# Patient Record
Sex: MALE | Race: WHITE | NOT HISPANIC OR LATINO | URBAN - METROPOLITAN AREA
[De-identification: names, ages, dates, MRNs, and addresses within clinical notes are randomized per-mention and may not be internally consistent; named-entity substitution may affect disease eponyms.]

---

## 2018-07-29 ENCOUNTER — INPATIENT (INPATIENT)
Facility: HOSPITAL | Age: 83
LOS: 5 days | Discharge: HOME | DRG: 291 | End: 2018-08-04
Attending: INTERNAL MEDICINE | Admitting: INTERNAL MEDICINE
Payer: MEDICARE

## 2018-07-29 VITALS
WEIGHT: 149.03 LBS | RESPIRATION RATE: 24 BRPM | HEART RATE: 60 BPM | TEMPERATURE: 98 F | HEIGHT: 64 IN | SYSTOLIC BLOOD PRESSURE: 168 MMHG | DIASTOLIC BLOOD PRESSURE: 54 MMHG | OXYGEN SATURATION: 97 %

## 2018-07-29 DIAGNOSIS — R06.09 OTHER FORMS OF DYSPNEA: ICD-10-CM

## 2018-07-29 LAB
ALBUMIN SERPL ELPH-MCNC: 3.4 G/DL — LOW (ref 3.5–5)
ALP SERPL-CCNC: 62 U/L — SIGNIFICANT CHANGE UP (ref 40–120)
ALT FLD-CCNC: 15 U/L DA — SIGNIFICANT CHANGE UP (ref 10–60)
ANION GAP SERPL CALC-SCNC: 7 MMOL/L — SIGNIFICANT CHANGE UP (ref 5–17)
APTT BLD: 30 SEC — SIGNIFICANT CHANGE UP (ref 27.5–37.4)
AST SERPL-CCNC: 16 U/L — SIGNIFICANT CHANGE UP (ref 10–40)
BASOPHILS # BLD AUTO: 0 K/UL — SIGNIFICANT CHANGE UP (ref 0–0.2)
BASOPHILS NFR BLD AUTO: 0.9 % — SIGNIFICANT CHANGE UP (ref 0–2)
BILIRUB SERPL-MCNC: 2.4 MG/DL — HIGH (ref 0.2–1.2)
BUN SERPL-MCNC: 52 MG/DL — HIGH (ref 7–18)
CALCIUM SERPL-MCNC: 9.6 MG/DL — SIGNIFICANT CHANGE UP (ref 8.4–10.5)
CHLORIDE SERPL-SCNC: 105 MMOL/L — SIGNIFICANT CHANGE UP (ref 96–108)
CK MB BLD-MCNC: 9.2 % — HIGH (ref 0–3.5)
CK MB CFR SERPL CALC: 1.1 NG/ML — SIGNIFICANT CHANGE UP (ref 0–3.6)
CK SERPL-CCNC: 12 U/L — LOW (ref 35–232)
CO2 SERPL-SCNC: 31 MMOL/L — SIGNIFICANT CHANGE UP (ref 22–31)
CREAT SERPL-MCNC: 2.48 MG/DL — HIGH (ref 0.5–1.3)
EOSINOPHIL # BLD AUTO: 0.2 K/UL — SIGNIFICANT CHANGE UP (ref 0–0.5)
EOSINOPHIL NFR BLD AUTO: 4.3 % — SIGNIFICANT CHANGE UP (ref 0–6)
GLUCOSE SERPL-MCNC: 139 MG/DL — HIGH (ref 70–99)
HCT VFR BLD CALC: 26.6 % — LOW (ref 39–50)
HGB BLD-MCNC: 9 G/DL — LOW (ref 13–17)
INR BLD: 1 RATIO — SIGNIFICANT CHANGE UP (ref 0.88–1.16)
LIDOCAIN IGE QN: 130 U/L — SIGNIFICANT CHANGE UP (ref 73–393)
LYMPHOCYTES # BLD AUTO: 1 K/UL — SIGNIFICANT CHANGE UP (ref 1–3.3)
LYMPHOCYTES # BLD AUTO: 18.3 % — SIGNIFICANT CHANGE UP (ref 13–44)
MCHC RBC-ENTMCNC: 30.2 PG — SIGNIFICANT CHANGE UP (ref 27–34)
MCHC RBC-ENTMCNC: 33.8 GM/DL — SIGNIFICANT CHANGE UP (ref 32–36)
MCV RBC AUTO: 89.2 FL — SIGNIFICANT CHANGE UP (ref 80–100)
MONOCYTES # BLD AUTO: 0.7 K/UL — SIGNIFICANT CHANGE UP (ref 0–0.9)
MONOCYTES NFR BLD AUTO: 12 % — SIGNIFICANT CHANGE UP (ref 2–14)
NEUTROPHILS # BLD AUTO: 3.7 K/UL — SIGNIFICANT CHANGE UP (ref 1.8–7.4)
NEUTROPHILS NFR BLD AUTO: 64.6 % — SIGNIFICANT CHANGE UP (ref 43–77)
NT-PROBNP SERPL-SCNC: 3084 PG/ML — HIGH (ref 0–450)
PLATELET # BLD AUTO: 154 K/UL — SIGNIFICANT CHANGE UP (ref 150–400)
POTASSIUM SERPL-MCNC: 3.6 MMOL/L — SIGNIFICANT CHANGE UP (ref 3.5–5.3)
POTASSIUM SERPL-SCNC: 3.6 MMOL/L — SIGNIFICANT CHANGE UP (ref 3.5–5.3)
PROT SERPL-MCNC: 6.7 G/DL — SIGNIFICANT CHANGE UP (ref 6–8.3)
PROTHROM AB SERPL-ACNC: 10.9 SEC — SIGNIFICANT CHANGE UP (ref 9.8–12.7)
RBC # BLD: 2.99 M/UL — LOW (ref 4.2–5.8)
RBC # FLD: 13.5 % — SIGNIFICANT CHANGE UP (ref 10.3–14.5)
SODIUM SERPL-SCNC: 143 MMOL/L — SIGNIFICANT CHANGE UP (ref 135–145)
TROPONIN I SERPL-MCNC: 0.02 NG/ML — SIGNIFICANT CHANGE UP (ref 0–0.04)
WBC # BLD: 5.7 K/UL — SIGNIFICANT CHANGE UP (ref 3.8–10.5)
WBC # FLD AUTO: 5.7 K/UL — SIGNIFICANT CHANGE UP (ref 3.8–10.5)

## 2018-07-29 PROCEDURE — 93010 ELECTROCARDIOGRAM REPORT: CPT

## 2018-07-29 PROCEDURE — 99285 EMERGENCY DEPT VISIT HI MDM: CPT

## 2018-07-29 PROCEDURE — 71045 X-RAY EXAM CHEST 1 VIEW: CPT | Mod: 26

## 2018-07-29 RX ORDER — FUROSEMIDE 40 MG
80 TABLET ORAL ONCE
Qty: 0 | Refills: 0 | Status: COMPLETED | OUTPATIENT
Start: 2018-07-29 | End: 2018-07-29

## 2018-07-29 RX ORDER — SODIUM CHLORIDE 9 MG/ML
3 INJECTION INTRAMUSCULAR; INTRAVENOUS; SUBCUTANEOUS ONCE
Qty: 0 | Refills: 0 | Status: COMPLETED | OUTPATIENT
Start: 2018-07-29 | End: 2018-07-29

## 2018-07-29 RX ADMIN — SODIUM CHLORIDE 3 MILLILITER(S): 9 INJECTION INTRAMUSCULAR; INTRAVENOUS; SUBCUTANEOUS at 18:50

## 2018-07-29 RX ADMIN — Medication 80 MILLIGRAM(S): at 20:51

## 2018-07-29 NOTE — ED PROVIDER NOTE - MEDICAL DECISION MAKING DETAILS
85 year old M Pt w/ dyspnea on exertion, leg edema concern for CHF, Pt also presents right eye blurred vision x 1 week, will check labs and admit patient

## 2018-07-29 NOTE — ED PROVIDER NOTE - CARE PLAN
Principal Discharge DX:	CLIFTON (dyspnea on exertion)  Secondary Diagnosis:	Renal insufficiency Principal Discharge DX:	CLIFTON (dyspnea on exertion)  Secondary Diagnosis:	Renal insufficiency  Secondary Diagnosis:	Blurred vision, right eye

## 2018-07-29 NOTE — ED PROVIDER NOTE - OBJECTIVE STATEMENT
85 year old M Pt w/ PMHx of pacemaker, asthma presents to ED c/o right eye blurry vision and dyspnea on exertion x 1 week. Pt reports that the blurred vision worsened today, causing him to be concerned. Pt was admitted to hospital July 2nd through July 9th for pneumonia and bacteriemia . Pt denies cough, fever, abdominal pain, chest pain, and all other complaints.

## 2018-07-29 NOTE — ED ADULT NURSE NOTE - OBJECTIVE STATEMENT
pt is A&Ox3, ambulatory, able to make needs known and presents with C/O SOB with excretion x 2 weeks and right eye blurriness  x1 day.

## 2018-07-29 NOTE — ED PROVIDER NOTE - PMH
AICD (automatic cardioverter/defibrillator) present    CAD (coronary artery disease)    CKD (chronic kidney disease)    Hypertension    Kidney carcinoma  s/p right nephrectomy  Pacemaker    Prostate cancer AICD (automatic cardioverter/defibrillator) present    CAD (coronary artery disease)    CKD (chronic kidney disease)    Hypertension    Kidney carcinoma  s/p right nephrectomy  Pacemaker    Prostate cancer    Stented coronary artery

## 2018-07-30 DIAGNOSIS — N18.9 CHRONIC KIDNEY DISEASE, UNSPECIFIED: ICD-10-CM

## 2018-07-30 DIAGNOSIS — Z29.9 ENCOUNTER FOR PROPHYLACTIC MEASURES, UNSPECIFIED: ICD-10-CM

## 2018-07-30 DIAGNOSIS — I25.10 ATHEROSCLEROTIC HEART DISEASE OF NATIVE CORONARY ARTERY WITHOUT ANGINA PECTORIS: ICD-10-CM

## 2018-07-30 DIAGNOSIS — I10 ESSENTIAL (PRIMARY) HYPERTENSION: ICD-10-CM

## 2018-07-30 DIAGNOSIS — I50.9 HEART FAILURE, UNSPECIFIED: ICD-10-CM

## 2018-07-30 DIAGNOSIS — H53.8 OTHER VISUAL DISTURBANCES: ICD-10-CM

## 2018-07-30 LAB
ALBUMIN SERPL ELPH-MCNC: 3.5 G/DL — SIGNIFICANT CHANGE UP (ref 3.5–5)
ALP SERPL-CCNC: 65 U/L — SIGNIFICANT CHANGE UP (ref 40–120)
ALT FLD-CCNC: 17 U/L DA — SIGNIFICANT CHANGE UP (ref 10–60)
ANION GAP SERPL CALC-SCNC: 6 MMOL/L — SIGNIFICANT CHANGE UP (ref 5–17)
AST SERPL-CCNC: 23 U/L — SIGNIFICANT CHANGE UP (ref 10–40)
BASOPHILS # BLD AUTO: 0 K/UL — SIGNIFICANT CHANGE UP (ref 0–0.2)
BASOPHILS NFR BLD AUTO: 0.7 % — SIGNIFICANT CHANGE UP (ref 0–2)
BILIRUB SERPL-MCNC: 2.5 MG/DL — HIGH (ref 0.2–1.2)
BUN SERPL-MCNC: 51 MG/DL — HIGH (ref 7–18)
CALCIUM SERPL-MCNC: 9.7 MG/DL — SIGNIFICANT CHANGE UP (ref 8.4–10.5)
CHLORIDE SERPL-SCNC: 105 MMOL/L — SIGNIFICANT CHANGE UP (ref 96–108)
CHOLEST SERPL-MCNC: 116 MG/DL — SIGNIFICANT CHANGE UP (ref 10–199)
CK MB BLD-MCNC: 4.8 % — HIGH (ref 0–3.5)
CK MB CFR SERPL CALC: 1.2 NG/ML — SIGNIFICANT CHANGE UP (ref 0–3.6)
CK SERPL-CCNC: 25 U/L — LOW (ref 35–232)
CO2 SERPL-SCNC: 32 MMOL/L — HIGH (ref 22–31)
CREAT SERPL-MCNC: 2.26 MG/DL — HIGH (ref 0.5–1.3)
EOSINOPHIL # BLD AUTO: 0.3 K/UL — SIGNIFICANT CHANGE UP (ref 0–0.5)
EOSINOPHIL NFR BLD AUTO: 5.3 % — SIGNIFICANT CHANGE UP (ref 0–6)
GLUCOSE SERPL-MCNC: 136 MG/DL — HIGH (ref 70–99)
HBA1C BLD-MCNC: 5 % — SIGNIFICANT CHANGE UP (ref 4–5.6)
HCT VFR BLD CALC: 28 % — LOW (ref 39–50)
HDLC SERPL-MCNC: 41 MG/DL — SIGNIFICANT CHANGE UP (ref 40–125)
HGB BLD-MCNC: 9.4 G/DL — LOW (ref 13–17)
LIPID PNL WITH DIRECT LDL SERPL: 45 MG/DL — SIGNIFICANT CHANGE UP
LYMPHOCYTES # BLD AUTO: 0.9 K/UL — LOW (ref 1–3.3)
LYMPHOCYTES # BLD AUTO: 20 % — SIGNIFICANT CHANGE UP (ref 13–44)
MAGNESIUM SERPL-MCNC: 2.3 MG/DL — SIGNIFICANT CHANGE UP (ref 1.6–2.6)
MCHC RBC-ENTMCNC: 30 PG — SIGNIFICANT CHANGE UP (ref 27–34)
MCHC RBC-ENTMCNC: 33.5 GM/DL — SIGNIFICANT CHANGE UP (ref 32–36)
MCV RBC AUTO: 89.4 FL — SIGNIFICANT CHANGE UP (ref 80–100)
MONOCYTES # BLD AUTO: 0.6 K/UL — SIGNIFICANT CHANGE UP (ref 0–0.9)
MONOCYTES NFR BLD AUTO: 11.9 % — SIGNIFICANT CHANGE UP (ref 2–14)
NEUTROPHILS # BLD AUTO: 3 K/UL — SIGNIFICANT CHANGE UP (ref 1.8–7.4)
NEUTROPHILS NFR BLD AUTO: 62.1 % — SIGNIFICANT CHANGE UP (ref 43–77)
PHOSPHATE SERPL-MCNC: 3.5 MG/DL — SIGNIFICANT CHANGE UP (ref 2.5–4.5)
PLATELET # BLD AUTO: 150 K/UL — SIGNIFICANT CHANGE UP (ref 150–400)
POTASSIUM SERPL-MCNC: 3.6 MMOL/L — SIGNIFICANT CHANGE UP (ref 3.5–5.3)
POTASSIUM SERPL-SCNC: 3.6 MMOL/L — SIGNIFICANT CHANGE UP (ref 3.5–5.3)
PROT SERPL-MCNC: 6.6 G/DL — SIGNIFICANT CHANGE UP (ref 6–8.3)
RBC # BLD: 3.13 M/UL — LOW (ref 4.2–5.8)
RBC # FLD: 13.4 % — SIGNIFICANT CHANGE UP (ref 10.3–14.5)
SODIUM SERPL-SCNC: 143 MMOL/L — SIGNIFICANT CHANGE UP (ref 135–145)
TOTAL CHOLESTEROL/HDL RATIO MEASUREMENT: 2.8 RATIO — LOW (ref 3.4–9.6)
TRIGL SERPL-MCNC: 149 MG/DL — SIGNIFICANT CHANGE UP (ref 10–149)
TROPONIN I SERPL-MCNC: 0.02 NG/ML — SIGNIFICANT CHANGE UP (ref 0–0.04)
TROPONIN I SERPL-MCNC: <0.015 NG/ML — SIGNIFICANT CHANGE UP (ref 0–0.04)
VIT B12 SERPL-MCNC: 944 PG/ML — SIGNIFICANT CHANGE UP (ref 232–1245)
WBC # BLD: 4.8 K/UL — SIGNIFICANT CHANGE UP (ref 3.8–10.5)
WBC # FLD AUTO: 4.8 K/UL — SIGNIFICANT CHANGE UP (ref 3.8–10.5)

## 2018-07-30 RX ORDER — HYDRALAZINE HCL 50 MG
25 TABLET ORAL EVERY 8 HOURS
Qty: 0 | Refills: 0 | Status: DISCONTINUED | OUTPATIENT
Start: 2018-07-30 | End: 2018-08-04

## 2018-07-30 RX ORDER — METOPROLOL TARTRATE 50 MG
50 TABLET ORAL DAILY
Qty: 0 | Refills: 0 | Status: DISCONTINUED | OUTPATIENT
Start: 2018-07-30 | End: 2018-08-04

## 2018-07-30 RX ORDER — CLOPIDOGREL BISULFATE 75 MG/1
75 TABLET, FILM COATED ORAL DAILY
Qty: 0 | Refills: 0 | Status: DISCONTINUED | OUTPATIENT
Start: 2018-07-30 | End: 2018-08-04

## 2018-07-30 RX ORDER — ISOSORBIDE DINITRATE 5 MG/1
20 TABLET ORAL
Qty: 0 | Refills: 0 | Status: DISCONTINUED | OUTPATIENT
Start: 2018-07-30 | End: 2018-07-30

## 2018-07-30 RX ORDER — SIMVASTATIN 20 MG/1
10 TABLET, FILM COATED ORAL AT BEDTIME
Qty: 0 | Refills: 0 | Status: DISCONTINUED | OUTPATIENT
Start: 2018-07-30 | End: 2018-08-04

## 2018-07-30 RX ORDER — FUROSEMIDE 40 MG
40 TABLET ORAL DAILY
Qty: 0 | Refills: 0 | Status: COMPLETED | OUTPATIENT
Start: 2018-07-30 | End: 2018-07-31

## 2018-07-30 RX ORDER — HEPARIN SODIUM 5000 [USP'U]/ML
5000 INJECTION INTRAVENOUS; SUBCUTANEOUS EVERY 8 HOURS
Qty: 0 | Refills: 0 | Status: DISCONTINUED | OUTPATIENT
Start: 2018-07-30 | End: 2018-08-04

## 2018-07-30 RX ADMIN — Medication 0.2 MILLIGRAM(S): at 13:21

## 2018-07-30 RX ADMIN — HEPARIN SODIUM 5000 UNIT(S): 5000 INJECTION INTRAVENOUS; SUBCUTANEOUS at 06:38

## 2018-07-30 RX ADMIN — Medication 50 MILLIGRAM(S): at 06:39

## 2018-07-30 RX ADMIN — Medication 25 MILLIGRAM(S): at 13:21

## 2018-07-30 RX ADMIN — Medication 1 APPLICATION(S): at 06:40

## 2018-07-30 RX ADMIN — Medication 40 MILLIGRAM(S): at 06:38

## 2018-07-30 RX ADMIN — CLOPIDOGREL BISULFATE 75 MILLIGRAM(S): 75 TABLET, FILM COATED ORAL at 11:36

## 2018-07-30 RX ADMIN — Medication 25 MILLIGRAM(S): at 06:39

## 2018-07-30 RX ADMIN — Medication 0.2 MILLIGRAM(S): at 06:39

## 2018-07-30 RX ADMIN — Medication 1 APPLICATION(S): at 17:39

## 2018-07-30 RX ADMIN — HEPARIN SODIUM 5000 UNIT(S): 5000 INJECTION INTRAVENOUS; SUBCUTANEOUS at 13:22

## 2018-07-30 RX ADMIN — Medication 1 APPLICATION(S): at 11:54

## 2018-07-30 NOTE — H&P ADULT - PROBLEM SELECTOR PLAN 1
Patient presented with dyspnea on exertion, with B/L leg swelling, Mild JVD with rales on auscultation on PE, BNP of 3084 --> acute on chronic CHF exacerbation  s/p 80 IV lasix  c/w strict input and output, daily weights   c/w lasix IV titrate as needed  fluid restriction   Echo on last admission shows EF of 30% with grade II DD  Cardio Dr. Aaron

## 2018-07-30 NOTE — H&P ADULT - ATTENDING COMMENTS
80 y/o male from home, ambulates independently with PMHX of CAD, + Stent placed in 2009 on Plavix QOD, AICD placed in 2011, Prostate CA, S/P surgery and RTX, Rt Nephrectomy for Rt Renal cell CA, Htn, HLD  Asthma stable, CKD ( baseline creatinine is 2.0) , Bradycardia (s/p Medtronic AICD), asthma, CKD, and COPD (not on home O2) S/P Colonoscopy in March 2015 c/w Diverticulosis , HX of Polyps removed, PVD ( s/p balloon left leg stent) came with complain of dyspnea and blurry vision since 1 week. Patient states he is developing worsening shortness of breath since 1 week to an extent that he is unable to walk from room to kitchen, on exertion and on talking. He also developed blurry vision in Rt eye since 1 week with purulent discharge which progressively got worse since 1 week. He denies chest pain, fever, chills, nausea, vomiting, diarrhea, constipation or any other complains.     In ED, patient's vital signs were remarkable for BP of 168/50, HR of 60, Temp normal, Labs were significant for HB 9.5, Cardiac enzymes x 2 negative, Cr: 2.26. EKG showed NSR, RBBB, No ST-T wave changes, paced rhythm, Patient was given 80 IV lasix push on admission.    pt seen in bed, a+o x3, nad, vitals stable except elevated bp, physical exam reveals no focal motor deficit, lungs decr bs left lower lobe, regular s1s2, abd soft nd nt bs+, ext no edema. labs and diagnostic test result reviewed.    assessment   --- chf exacerb, left pleural eff, acute on ckd, right eye conjunctivitis, h/o CAD, + Stent placed in 2009 on Plavix QOD, AICD placed in 2011, Prostate CA, S/P surgery and RTX, Rt Nephrectomy for Rt Renal cell CA, Htn, HLD  Asthma stable, CKD ( baseline creatinine is 2.0) , Bradycardia (s/p Medtronic AICD), asthma, CKD, and COPD (not on home O2) S/P Colonoscopy in March 2015 c/w Diverticulosis , HX of Polyps removed, PVD ( s/p balloon left leg stent)     plan  --  adm to tele, acs protocol, lopressor, lasix, aspirin, statin, cont preadmit home meds, gi and dvt profilaxis  cbc, bmp, mg, phos, lipid, tsh, ce q8 x3    echo    cardio cons  pulm cons  renal cons  phys tx 80 y/o male from home, ambulates independently with PMHX of CAD, + Stent placed in 2009 on Plavix QOD, AICD placed in 2011, Prostate CA, S/P surgery and RTX, Rt Nephrectomy for Rt Renal cell CA, Htn, HLD  Asthma stable, CKD ( baseline creatinine is 2.0) , Bradycardia (s/p Medtronic AICD), asthma, CKD, and COPD (not on home O2) S/P Colonoscopy in March 2015 c/w Diverticulosis , HX of Polyps removed, PVD ( s/p balloon left leg stent) came with complain of dyspnea and blurry vision since 1 week. Patient states he is developing worsening shortness of breath since 1 week to an extent that he is unable to walk from room to kitchen, on exertion and on talking. He also developed blurry vision in Rt eye since 1 week with purulent discharge which progressively got worse since 1 week. He denies chest pain, fever, chills, nausea, vomiting, diarrhea, constipation or any other complains.     In ED, patient's vital signs were remarkable for BP of 168/50, HR of 60, Temp normal, Labs were significant for HB 9.5, Cardiac enzymes x 2 negative, Cr: 2.26. EKG showed NSR, RBBB, No ST-T wave changes, paced rhythm, Patient was given 80 IV lasix push on admission.    pt seen in bed, a+o x3, nad, vitals stable except elevated bp, physical exam reveals no focal motor deficit, right eye scleral erythema, lungs decr bs left lower lobe, regular s1s2, abd soft nd nt bs+, ext no edema. labs and diagnostic test result reviewed.    assessment   --- chf exacerb, left pleural eff, acute on ckd, right eye conjunctivitis, h/o CAD, + Stent placed in 2009 on Plavix QOD, AICD placed in 2011, Prostate CA, S/P surgery and RTX, Rt Nephrectomy for Rt Renal cell CA, Htn, HLD  Asthma stable, CKD ( baseline creatinine is 2.0) , Bradycardia (s/p Medtronic AICD), asthma, CKD, and COPD (not on home O2) S/P Colonoscopy in March 2015 c/w Diverticulosis , HX of Polyps removed, PVD ( s/p balloon left leg stent)     plan  --  adm to tele, acs protocol, lopressor, lasix, aspirin, statin, neosporin ophthalmic ointment to r eye, cont preadmit home meds, gi and dvt profilaxis  cbc, bmp, mg, phos, lipid, tsh, ce q8 x3    echo    cardio cons  pulm cons  renal cons  phys tx

## 2018-07-30 NOTE — H&P ADULT - PROBLEM SELECTOR PLAN 2
Patient has blurred vision with purulent discharge from Rt eye, now improving, Denies photophobia.   In ED, IOP was done which was normal  could be bacterial conjunctivitis   started neomycin eye ointment

## 2018-07-30 NOTE — H&P ADULT - HISTORY OF PRESENT ILLNESS
80 y/o male from home, ambulates independently with PMHX of CAD, + Stent placed in 2009 on Plavix QOD, AICD placed in 2011, Prostate CA, S/P surgery and RTX, Rt Nephrectomy for Rt Renal cell CA, Htn, HLD  Asthma stable, CKD ( baseline creatinine is 2.0) , Bradycardia (s/p Medtronic AICD), asthma, CKD, and COPD (not on home O2) S/P Colonoscopy in March 2015 c/w Diverticulosis , HX of Polyps removed, PVD ( s/p balloon left leg stent) came with complain of dyspnea and blurry vision since 1 week. Patient states he is developing worsening shortness of breath since 1 week to an extent that he is unable to walk from room to kitchen, on exertion and on talking. He also developed blurry vision in Rt eye since 1 week with purulent discharge which progressively got worse since 1 week. He denies chest pain, fever, chills, nausea, vomiting, diarrhea, constipation or any other complains.     In ED, patient's vital signs were remarkable for BP of 168/50, HR of 60, Temp normal, Labs were significant for HB 9.5, Cardiac enzymes x 2 negative, Cr: 2.26. EKG showed NSR, RBBB, No ST-T wave changes, paced rhythm, Patient was given 80 IV lasix push on admission

## 2018-07-30 NOTE — H&P ADULT - PMH
AICD (automatic cardioverter/defibrillator) present    CAD (coronary artery disease)    CKD (chronic kidney disease)    Hypertension    Kidney carcinoma  s/p right nephrectomy  Pacemaker    Prostate cancer    Stented coronary artery

## 2018-07-30 NOTE — CONSULT NOTE ADULT - SUBJECTIVE AND OBJECTIVE BOX
CHIEF COMPLAINT: Patient is a 85y old  Male who presents with a chief complaint of Shortness of breath (30 Jul 2018 04:10)      HPI:  80 y/o male from home, ambulates independently with PMHX of CAD, + Stent placed in 2009 on Plavix QOD, AICD placed in 2011, Prostate CA, S/P surgery and RTX, Rt Nephrectomy for Rt Renal cell CA, Htn, HLD  Asthma stable, CKD ( baseline creatinine is 2.0) , Bradycardia (s/p Medtronic AICD), asthma, CKD, and COPD (not on home O2) S/P Colonoscopy in March 2015 c/w Diverticulosis , HX of Polyps removed, PVD ( s/p balloon left leg stent) came with complain of dyspnea and blurry vision since 1 week. Patient states he is developing worsening shortness of breath since 1 week to an extent that he is unable to walk from room to kitchen, on exertion and on talking. He also developed blurry vision in Rt eye since 1 week with purulent discharge which progressively got worse since 1 week. He denies chest pain, fever, chills, nausea, vomiting, diarrhea, constipation or any other complains.     In ED, patient's vital signs were remarkable for BP of 168/50, HR of 60, Temp normal, Labs were significant for HB 9.5, Cardiac enzymes x 2 negative, Cr: 2.26. EKG showed NSR, RBBB, No ST-T wave changes, paced rhythm, Patient was given 80 IV lasix push on admission (30 Jul 2018 04:10)   Patient seen and examined.     PAST MEDICAL & SURGICAL HISTORY:  Stented coronary artery  Pacemaker  CKD (chronic kidney disease)  Hypertension  AICD (automatic cardioverter/defibrillator) present  CAD (coronary artery disease)  Kidney carcinoma: s/p right nephrectomy  Prostate cancer  Kidney carcinoma: s/p right nephrectomy  Hernia: Hernioplasty  Prostate: prostatectomy      Allergies    aspirin (Anaphylaxis)    Intolerances        MEDICATIONS  (STANDING):  cloNIDine 0.2 milliGRAM(s) Oral three times a day  clopidogrel Tablet 75 milliGRAM(s) Oral daily  furosemide   Injectable 40 milliGRAM(s) IV Push daily  heparin  Injectable 5000 Unit(s) SubCutaneous every 8 hours  hydrALAZINE 25 milliGRAM(s) Oral every 8 hours  metoprolol succinate ER 50 milliGRAM(s) Oral daily  neomycin/BACItracin/polymyxin Ointment 1 Application(s) Right EYE every 6 hours  simvastatin 10 milliGRAM(s) Oral at bedtime      MEDICATIONS  (PRN):       Medications up to date at time of exam.    FAMILY HISTORY:  No pertinent family history in first degree relatives      SOCIAL HISTORY  Smoking History: [   ] smoking/smoke exposure, [   ] former smoker  Living Condition: [   ] apartment, [   ] private house  Work History:   Travel History: denies recent travel  Illicit Substance Use: denies  Alcohol Use: denies    REVIEW OF SYSTEMS:    CONSTITUTIONAL:  denies fevers, chills, sweats, weight loss    HEENT:  denies diplopia or blurred vision, sore throat or runny nose.    CARDIOVASCULAR:  denies pressure, squeezing, tightness, or heaviness about the chest; no palpitations.    RESPIRATORY:  denies SOB, cough, CLIFTON, wheezing.    GASTROINTESTINAL:  denies abdominal pain, nausea, vomiting or diarrhea.    GENITOURINARY: denies dysuria, frequency or urgency.    NEUROLOGIC:  denies numbness, tingling, seizures or weakness.    PSYCHIATRIC:  denies disorder of thought or mood.    MSK: denies swelling, redness      PHYSICAL EXAMINATION:    GENERAL: The patient is a well-developed, well-nourished, in no apparent distress.     Vital Signs Last 24 Hrs  T(C): 37.1 (30 Jul 2018 17:00), Max: 37.1 (30 Jul 2018 13:25)  T(F): 98.8 (30 Jul 2018 17:00), Max: 98.8 (30 Jul 2018 17:00)  HR: 60 (30 Jul 2018 17:00) (58 - 60)  BP: 137/47 (30 Jul 2018 17:00) (137/47 - 174/82)  BP(mean): --  RR: 16 (30 Jul 2018 17:00) (16 - 20)  SpO2: 98% (30 Jul 2018 17:00) (96% - 99%)    HEENT: head is normocephalic and atraumatic. mucous membranes are moist.    NECK: supple, no palpable adenopathy.    LUNGS: clear to auscultation, no wheezing, rales, or rhonchi.    HEART: regular rate and rhythm + II/VI HSM @ LSB.    ABDOMEN: soft, nontender, and nondistended.     EXTREMITIES: without any cyanosis, clubbing, rash, lesions or +2 B/L LE edema.    NEUROLOGIC: awake, alert, oriented.     SKIN: warm, dry, good turgor.      LABS:                        9.4    4.8   )-----------( 150      ( 30 Jul 2018 04:34 )             28.0     07-30    143  |  105  |  51<H>  ----------------------------<  136<H>  3.6   |  32<H>  |  2.26<H>    Ca    9.7      30 Jul 2018 04:34  Phos  3.5     07-30  Mg     2.3     07-30    TPro  6.6  /  Alb  3.5  /  TBili  2.5<H>  /  DBili  x   /  AST  23  /  ALT  17  /  AlkPhos  65  07-30    PT/INR - ( 29 Jul 2018 18:51 )   PT: 10.9 sec;   INR: 1.00 ratio         PTT - ( 29 Jul 2018 18:51 )  PTT:30.0 sec      CARDIAC MARKERS ( 30 Jul 2018 11:03 )  0.024 ng/mL / x     / x     / x     / x      CARDIAC MARKERS ( 30 Jul 2018 04:33 )  <0.015 ng/mL / x     / 25 U/L / x     / 1.2 ng/mL  CARDIAC MARKERS ( 29 Jul 2018 18:51 )  0.022 ng/mL / x     / 12 U/L / x     / 1.1 ng/mL        Serum Pro-Brain Natriuretic Peptide: 3084 pg/mL (07-29-18 @ 18:51)          Troponin 0.024 07-30 @ 11:03  CK -- 07-30 @ 11:03  CKMB -- 07-30 @ 11:03  Troponin <0.015 07-30 @ 04:33  CK 25 07-30 @ 04:33  CKMB -- 07-30 @ 04:33  .    MICROBIOLOGY: (if applicable)    RADIOLOGY & ADDITIONAL STUDIES:  EKG:   CXR:  ECHO:  < from: Transthoracic Echocardiogram (12.14.16 @ 07:21) >    Patient name: AN VINES  YOB: 1933   Age: 83 (M)   MR#: [id number]  Study Date: 12/14/2016  [facility] Zhou  Study quality: Fair  Referring Physician:  BRANDON MARTINEZ MD  Blood Pressure: 132/50 mmHg  Height: 162 cm  Weight: 71 kg  BSA: 1.8 m2  ------------------------------------------------------------------------    PROCEDURE: Transthoracic echocardiogram with 2-D, M-Mode  and complete spectral and color flow Doppler.  INDICATION: Unspecifiedcombined systolic (congestive) and  diastolic (congestive) heart failure (I50.40)  ------------------------------------------------------------------------  DIMENSIONS:  Dimensions:     Normal Values:  LA:     4.4 cm    2.0 - 4.0 cm  Ao:     3.3 cm 2.0 - 3.8 cm  SEPTUM: 1.2 cm    0.6 - 1.2 cm  PWT:    1.1 cm    0.6 - 1.1 cm  LVIDd:  5.2 cm    3.0 - 5.6 cm  LVIDs:  4.1 cm    1.8 - 4.0 cm      Derived Variables:  LVMI: 133 g/m2  RWT: 0.42  Ejection Fraction Visual Estimate: 35-40 %    ------------------------------------------------------------------------  OBSERVATIONS:  Mitral Valve: Normal mitral valve. Minimal mitral  regurgitation.  Aortic Root: Normal aortic root.  Aortic Valve: Calcified trileaflet aortic valve with normal  opening.  Left Atrium: Normal left atrium.  Left Ventricle: Endocardium not well visualized; grossly  moderate global LV systolic dysfunction. Septal motion  consistent with paced rhythm. Moderate diastolic  dysfunction (Stage II). Mild concentric left ventricular  hypertrophy.  Right Heart: Normal right atrium. Normal right ventricular  size and function. A device lead is visualized in the right  heart. There is severe tricuspid regurgitation. There is  trace pulmonic regurgitation.  Pericardium/PleuraNormal pericardium with no pericardial  effusion.  Hemodynamic: RA Pressure is 10 mm Hg. RVS Pressure is 77 mm  Hg. Severe pulmonary hypertension.  ------------------------------------------------------------------------  CONCLUSIONS:  1. Minimal mitral regurgitation.  2. Mild left atrial enlargement.  3. Mild concentric left ventricular hypertrophy.  4. Endocardium not well visualized; grossly moderate global  LV systolic dysfunction. Septal motion consistent with  paced rhythm. Moderate diastolic dysfunction (Stage II).  5. Normal right ventricular size and function. A device  lead is visualized in the right heart.  6. RVS Pressure is 77 mm Hg. Severe pulmonary hypertension.  7. There is severe tricuspid regurgitation.    ------------------------------------------------------------------------  Confirmed on  12/14/2016 - 17:53:54 by Enrrique Connelly MD  ------------------------------------------------------------------------    < end of copied text >    TELE:    IMPRESSION: 85y Male PAST MEDICAL & SURGICAL HISTORY:  Stented coronary artery  Pacemaker  CKD (chronic kidney disease)  Hypertension  AICD (automatic cardioverter/defibrillator) present  CAD (coronary artery disease)  Kidney carcinoma: s/p right nephrectomy  Prostate cancer  Kidney carcinoma: s/p right nephrectomy  Hernia: Hernioplasty  Prostate: prostatectomy    80 y/o male from home, ambulates independently with PMHX of CAD, + Stent placed in 2009 on Plavix QOD, AICD placed in 2011, Prostate CA, S/P surgery and RTX, Rt Nephrectomy for Rt Renal cell CA, Htn, HLD  Asthma stable, CKD ( baseline creatinine is 2.0) , Bradycardia (s/p Medtronic AICD), asthma, CKD, and COPD (not on home O2) S/P Colonoscopy in March 2015 c/w Diverticulosis , HX of Polyps removed, PVD ( s/p balloon left leg stent) came with complain of dyspnea and blurry vision since 1 week. Patient states he is developing worsening shortness of breath since 1 week to an extent that he is unable to walk from room to kitchen, on exertion and on talking. He also developed blurry vision in Rt eye since 1 week with purulent discharge which progressively got worse since 1 week. He denies chest pain, fever, chills, nausea, vomiting, diarrhea, constipation or any other complains.         RECOMMENDATIONS:    Patient is my office patient, he presents with SOB due to HF exacerbation. Pbnp noted to be 3,000. Patient previously had an ICD, which was removed due to infection. He subsequently had PPM inserted into R ACW as his EF recovered. Admit to tele, 2D echo, stress test. Patient was initially scheduled for ST as an out patient.

## 2018-07-30 NOTE — H&P ADULT - ASSESSMENT
82 y/o male from home, ambulates independently with PMHX of CAD, + Stent placed in 2009 on Plavix QOD, AICD placed in 2011, Prostate CA, S/P surgery and RTX, Rt Nephrectomy for Rt Renal cell CA, Htn, HLD  Asthma stable, CKD ( baseline creatinine is 2.0) , Bradycardia (s/p Medtronic AICD), asthma, CKD, and COPD (not on home O2) S/P Colonoscopy in March 2015 c/w Diverticulosis , HX of Polyps removed, PVD ( s/p balloon left leg stent) came with complain of dyspnea and blurry vision since 1 week.        In ED, patient's vital signs were remarkable for BP of 168/50, HR of 60, Temp normal, Labs were significant for HB 9.5, Cardiac enzymes x 2 negative, Cr: 2.26. EKG showed NSR, RBBB, No ST-T wave changes, paced rhythm, Patient was given 80 IV lasix push on admission

## 2018-07-30 NOTE — H&P ADULT - PROBLEM SELECTOR PLAN 4
S/P Nephrectomy  Patient has baseline CR of 2.0,  Cr at baseline   f/u BMP S/P Nephrectomy  Patient has baseline CR of 2.0,  Cr at baseline   f/u U/s Kidney  f/u BMP

## 2018-07-30 NOTE — H&P ADULT - NSHPLABSRESULTS_GEN_ALL_CORE
Vital Signs Last 24 Hrs  T(C): 36.4 (30 Jul 2018 00:14), Max: 36.8 (29 Jul 2018 17:03)  T(F): 97.5 (30 Jul 2018 00:14), Max: 98.2 (29 Jul 2018 17:03)  HR: 59 (30 Jul 2018 00:14) (59 - 60)  BP: 166/52 (30 Jul 2018 00:14) (155/48 - 168/54)  BP(mean): --  RR: 16 (30 Jul 2018 00:14) (16 - 24)  SpO2: 97% (30 Jul 2018 00:14) (97% - 97%)

## 2018-07-30 NOTE — H&P ADULT - PROBLEM SELECTOR PLAN 5
BP initially high  started clonidine, hydralazine, toprol  Also take imdur at home currently holding as started IV lasix which will droped his Blood pressure, Please monitor Blood pressure

## 2018-07-30 NOTE — ED ADULT NURSE REASSESSMENT NOTE - NS ED NURSE REASSESS COMMENT FT1
received pt from NED[facility] Gonsalez pt ambulatory with assistance admitted to tele on cont cardiac monitoring.

## 2018-07-30 NOTE — H&P ADULT - NSHPPHYSICALEXAM_GEN_ALL_CORE
PHYSICAL EXAM:  GENERAL: In mild distress due to shortness of breath  HEAD:  Atraumatic, Normocephalic  EYES:  conjunctiva and sclera clear  NECK: Supple, No JVD, Normal thyroid  CHEST/LUNG: Mild B/L Rales on lower lobes Clear to percussion bilaterally; No  rhonchi, wheezing, or rubs  HEART: Regular rate and rhythm; No murmurs, rubs, or gallops  ABDOMEN: Soft, Nontender, Nondistended; Bowel sounds present  NERVOUS SYSTEM:  Alert & Oriented X3,    EXTREMITIES:  B/l leg edema of 2+, 2+ Peripheral Pulses, No clubbing, cyanosis  SKIN: warm dry

## 2018-07-30 NOTE — PATIENT PROFILE ADULT. - VISION (WITH CORRECTIVE LENSES IF THE PATIENT USUALLY WEARS THEM):
Partially impaired: cannot see medication labels or newsprint, but can see obstacles in path, and the surrounding layout; can count fingers at arm's length/w/glasses

## 2018-07-30 NOTE — CONSULT NOTE ADULT - SUBJECTIVE AND OBJECTIVE BOX
CHIEF COMPLAINT: Patient is a 85y old  Male who presents with a chief complaint of Shortness of breath (30 Jul 2018 04:10)      HPI:  82 y/o male from home, ambulates independently with PMHX of CAD, + Stent placed in 2009 on Plavix QOD, AICD placed in 2011, Prostate CA, S/P surgery and RTX, Rt Nephrectomy for Rt Renal cell CA, Htn, HLD  Asthma stable, CKD ( baseline creatinine is 2.0) , Bradycardia (s/p Medtronic AICD), asthma, CKD, and COPD (not on home O2) S/P Colonoscopy in March 2015 c/w Diverticulosis , HX of Polyps removed, PVD ( s/p balloon left leg stent) came with complain of dyspnea and blurry vision since 1 week. Patient states he is developing worsening shortness of breath since 1 week to an extent that he is unable to walk from room to kitchen, on exertion and on talking. He also developed blurry vision in Rt eye since 1 week with purulent discharge which progressively got worse since 1 week. He denies chest pain, fever, chills, nausea, vomiting, diarrhea, constipation or any other complains.     In ED, patient's vital signs were remarkable for BP of 168/50, HR of 60, Temp normal, Labs were significant for HB 9.5, Cardiac enzymes x 2 negative, Cr: 2.26. EKG showed NSR, RBBB, No ST-T wave changes, paced rhythm, Patient was given 80 IV lasix push on admission (30 Jul 2018 04:10)   Patient seen and examined.     PAST MEDICAL & SURGICAL HISTORY:  Stented coronary artery  Pacemaker  CKD (chronic kidney disease)  Hypertension  AICD (automatic cardioverter/defibrillator) present  CAD (coronary artery disease)  Kidney carcinoma: s/p right nephrectomy  Prostate cancer  Kidney carcinoma: s/p right nephrectomy  Hernia: Hernioplasty  Prostate: prostatectomy      Allergies    aspirin (Anaphylaxis)    Intolerances        MEDICATIONS  (STANDING):  cloNIDine 0.2 milliGRAM(s) Oral three times a day  clopidogrel Tablet 75 milliGRAM(s) Oral daily  furosemide   Injectable 40 milliGRAM(s) IV Push daily  heparin  Injectable 5000 Unit(s) SubCutaneous every 8 hours  hydrALAZINE 25 milliGRAM(s) Oral every 8 hours  metoprolol succinate ER 50 milliGRAM(s) Oral daily  neomycin/BACItracin/polymyxin Ointment 1 Application(s) Right EYE every 6 hours  simvastatin 10 milliGRAM(s) Oral at bedtime      MEDICATIONS  (PRN):   Medications up to date at time of exam.    FAMILY HISTORY:  No pertinent family history in first degree relatives      SOCIAL HISTORY  Smoking History: [   ] smoking/smoke exposure, [   ] former smoker, [ x ] denies smoking  Living Condition: [   ] apartment, [   ] private house  Work History:   Travel History: denies recent travel  Illicit Substance Use: denies  Alcohol Use: denies    REVIEW OF SYSTEMS:    CONSTITUTIONAL:  denies fevers, chills, sweats, weight loss    HEENT:  denies diplopia or blurred vision, sore throat or runny nose.    CARDIOVASCULAR:  denies pressure, squeezing, tightness, or heaviness about the chest; no palpitations.    RESPIRATORY:  denies SOB, cough, CLIFTON, wheezing.    GASTROINTESTINAL:  denies abdominal pain, nausea, vomiting or diarrhea.    GENITOURINARY: denies dysuria, frequency or urgency.    NEUROLOGIC:  denies numbness, tingling, seizures or weakness.    PSYCHIATRIC:  denies disorder of thought or mood.    MSK: denies swelling, redness      PHYSICAL EXAMINATION:    GENERAL: The patient is a well-developed, well-nourished, in no apparent distress.     Vital Signs Last 24 Hrs  T(C): 37.1 (30 Jul 2018 13:25), Max: 37.1 (30 Jul 2018 13:25)  T(F): 98.7 (30 Jul 2018 13:25), Max: 98.7 (30 Jul 2018 13:25)  HR: 60 (30 Jul 2018 13:51) (58 - 60)  BP: 158/77 (30 Jul 2018 13:51) (155/48 - 174/82)  BP(mean): --  RR: 18 (30 Jul 2018 13:25) (16 - 24)  SpO2: 99% (30 Jul 2018 13:51) (96% - 99%)   (if applicable)    Chest Tube (if applicable)    HEENT: Head is normocephalic and atraumatic. .    NECK: Supple, no palpable adenopathy.    LUNGS: Clear to auscultation, no wheezing, rales, or rhonchi. +crackles at bases    HEART: Regular rate and rhythm without murmur.    ABDOMEN: Soft, nontender, and nondistended.  No hepatosplenomegaly is noted.    EXTREMITIES: Without any cyanosis, clubbing, rash, lesions or +B/L LE edema.    NEUROLOGIC: Awake, alert.    SKIN: Warm, dry, good turgor.      LABS:                        9.4    4.8   )-----------( 150      ( 30 Jul 2018 04:34 )             28.0     07-30    143  |  105  |  51<H>  ----------------------------<  136<H>  3.6   |  32<H>  |  2.26<H>    Ca    9.7      30 Jul 2018 04:34  Phos  3.5     07-30  Mg     2.3     07-30    TPro  6.6  /  Alb  3.5  /  TBili  2.5<H>  /  DBili  x   /  AST  23  /  ALT  17  /  AlkPhos  65  07-30    PT/INR - ( 29 Jul 2018 18:51 )   PT: 10.9 sec;   INR: 1.00 ratio         PTT - ( 29 Jul 2018 18:51 )  PTT:30.0 sec      CARDIAC MARKERS ( 30 Jul 2018 11:03 )  0.024 ng/mL / x     / x     / x     / x      CARDIAC MARKERS ( 30 Jul 2018 04:33 )  <0.015 ng/mL / x     / 25 U/L / x     / 1.2 ng/mL  CARDIAC MARKERS ( 29 Jul 2018 18:51 )  0.022 ng/mL / x     / 12 U/L / x     / 1.1 ng/mL        Serum Pro-Brain Natriuretic Peptide: 3084 pg/mL (07-29-18 @ 18:51)          MICROBIOLOGY: (if applicable)    RADIOLOGY & ADDITIONAL STUDIES:  EKG:   CXR:  < from: Xray Chest 1 View AP/PA (07.29.18 @ 18:14) >    EXAM:  XR CHEST AP OR PA 1V                            PROCEDURE DATE:  07/29/2018          INTERPRETATION:  CLINICAL STATEMENT: Chest Pain.    TECHNIQUE: AP view of the chest.    COMPARISON: 12/13/2016    FINDINGS/  IMPRESSION:  Study limited due to rotation.    Right cardiac device. New since prior exam.    Small left pleural effusion with atelectasis left lung base.    Heart size cannot be accurately assessed in this projection.                  LENCHO LEIJA M.D., ATTENDING RADIOLOGIST  This document has been electronically signed. Jul 30 2018  7:08AM                < end of copied text >    ECHO:    IMPRESSION: 85y Male PAST MEDICAL & SURGICAL HISTORY:  Stented coronary artery  Pacemaker  CKD (chronic kidney disease)  Hypertension  AICD (automatic cardioverter/defibrillator) present  CAD (coronary artery disease)  Kidney carcinoma: s/p right nephrectomy  Prostate cancer  Kidney carcinoma: s/p right nephrectomy  Hernia: Hernioplasty  Prostate: prostatectomy       82 y/o male from home, ambulates independently with PMHX of CAD, + Stent placed in 2009 on Plavix QOD, AICD placed in 2011, Prostate CA, S/P surgery and RTX, Rt Nephrectomy for Rt Renal cell CA, Htn, HLD  Asthma stable, CKD ( baseline creatinine is 2.0) , Bradycardia (s/p Medtronic AICD), asthma, CKD, and COPD (not on home O2) S/P Colonoscopy in March 2015 c/w Diverticulosis , HX of Polyps removed, PVD ( s/p balloon left leg stent) came with complain of dyspnea and blurry vision since 1 week. Patient states he is developing worsening shortness of breath since 1 week to an extent that he is unable to walk from room to kitchen, on exertion and on talking. He also developed blurry vision in Rt eye since 1 week with purulent discharge which progressively got worse since 1 week. He denies chest pain, fever, chills, nausea, vomiting, diarrhea, constipation or any other complains.     SOB due to HF exacerbation    +trace pleural effusion  +blurry vision R eye, conjunctivitis?    SUGGESTION:     - diurese   - bronchodilators, o2 supp prn   - repeat cxr in 36hrs   - f/u bcx   - DVT and GI prophylaxis. CHIEF COMPLAINT: Patient is a 85y old  Male who presents with a chief complaint of Shortness of breath (30 Jul 2018 04:10)      HPI:  82 y/o male from home, ambulates independently with PMHX of CAD, + Stent placed in 2009 on Plavix QOD, AICD placed in 2011, Prostate CA, S/P surgery and RTX, Rt Nephrectomy for Rt Renal cell CA, Htn, HLD  Asthma stable, CKD ( baseline creatinine is 2.0) , Bradycardia (s/p Medtronic AICD), asthma, CKD, and COPD (not on home O2) S/P Colonoscopy in March 2015 c/w Diverticulosis , HX of Polyps removed, PVD ( s/p balloon left leg stent) came with complain of dyspnea and blurry vision since 1 week. Patient states he is developing worsening shortness of breath since 1 week to an extent that he is unable to walk from room to kitchen, on exertion and on talking. He also developed blurry vision in Rt eye since 1 week with purulent discharge which progressively got worse since 1 week. He denies chest pain, fever, chills, nausea, vomiting, diarrhea, constipation or any other complains.     In ED, patient's vital signs were remarkable for BP of 168/50, HR of 60, Temp normal, Labs were significant for HB 9.5, Cardiac enzymes x 2 negative, Cr: 2.26. EKG showed NSR, RBBB, No ST-T wave changes, paced rhythm, Patient was given 80 IV lasix push on admission (30 Jul 2018 04:10)   Patient seen and examined.     PAST MEDICAL & SURGICAL HISTORY:  Stented coronary artery  Pacemaker  CKD (chronic kidney disease)  Hypertension  AICD (automatic cardioverter/defibrillator) present  CAD (coronary artery disease)  Kidney carcinoma: s/p right nephrectomy  Prostate cancer  Kidney carcinoma: s/p right nephrectomy  Hernia: Hernioplasty  Prostate: prostatectomy      Allergies    aspirin (Anaphylaxis)    Intolerances        MEDICATIONS  (STANDING):  cloNIDine 0.2 milliGRAM(s) Oral three times a day  clopidogrel Tablet 75 milliGRAM(s) Oral daily  furosemide   Injectable 40 milliGRAM(s) IV Push daily  heparin  Injectable 5000 Unit(s) SubCutaneous every 8 hours  hydrALAZINE 25 milliGRAM(s) Oral every 8 hours  metoprolol succinate ER 50 milliGRAM(s) Oral daily  neomycin/BACItracin/polymyxin Ointment 1 Application(s) Right EYE every 6 hours  simvastatin 10 milliGRAM(s) Oral at bedtime      MEDICATIONS  (PRN):   Medications up to date at time of exam.    FAMILY HISTORY:  No pertinent family history in first degree relatives      SOCIAL HISTORY  Smoking History: [   ] smoking/smoke exposure, [ x  ] former smoker, [  ] denies smoking  Living Condition: [   ] apartment, [   ] private house  Work History:   Travel History: denies recent travel  Illicit Substance Use: denies  Alcohol Use: denies    REVIEW OF SYSTEMS:    CONSTITUTIONAL:  denies fevers, chills, sweats, weight loss    HEENT:  denies diplopia or blurred vision, sore throat or runny nose.    CARDIOVASCULAR:  denies pressure, squeezing, tightness, or heaviness about the chest; no palpitations.    RESPIRATORY:  denies SOB, cough, CLIFTON, wheezing.    GASTROINTESTINAL:  denies abdominal pain, nausea, vomiting or diarrhea.    GENITOURINARY: denies dysuria, frequency or urgency.    NEUROLOGIC:  denies numbness, tingling, seizures or weakness.    PSYCHIATRIC:  denies disorder of thought or mood.    MSK: denies swelling, redness      PHYSICAL EXAMINATION:    GENERAL: The patient is a well-developed, well-nourished, in no apparent distress.     Vital Signs Last 24 Hrs  T(C): 37.1 (30 Jul 2018 13:25), Max: 37.1 (30 Jul 2018 13:25)  T(F): 98.7 (30 Jul 2018 13:25), Max: 98.7 (30 Jul 2018 13:25)  HR: 60 (30 Jul 2018 13:51) (58 - 60)  BP: 158/77 (30 Jul 2018 13:51) (155/48 - 174/82)  BP(mean): --  RR: 18 (30 Jul 2018 13:25) (16 - 24)  SpO2: 99% (30 Jul 2018 13:51) (96% - 99%)   (if applicable)    Chest Tube (if applicable)    HEENT: Head is normocephalic and atraumatic. .    NECK: Supple, no palpable adenopathy.    LUNGS: Clear to auscultation, no wheezing, rales, or rhonchi. +crackles at bases    HEART: Regular rate and rhythm without murmur.    ABDOMEN: Soft, nontender, and nondistended.  No hepatosplenomegaly is noted.    EXTREMITIES: Without any cyanosis, clubbing, rash, lesions or +B/L LE edema.    NEUROLOGIC: Awake, alert.    SKIN: Warm, dry, good turgor.      LABS:                        9.4    4.8   )-----------( 150      ( 30 Jul 2018 04:34 )             28.0     07-30    143  |  105  |  51<H>  ----------------------------<  136<H>  3.6   |  32<H>  |  2.26<H>    Ca    9.7      30 Jul 2018 04:34  Phos  3.5     07-30  Mg     2.3     07-30    TPro  6.6  /  Alb  3.5  /  TBili  2.5<H>  /  DBili  x   /  AST  23  /  ALT  17  /  AlkPhos  65  07-30    PT/INR - ( 29 Jul 2018 18:51 )   PT: 10.9 sec;   INR: 1.00 ratio         PTT - ( 29 Jul 2018 18:51 )  PTT:30.0 sec      CARDIAC MARKERS ( 30 Jul 2018 11:03 )  0.024 ng/mL / x     / x     / x     / x      CARDIAC MARKERS ( 30 Jul 2018 04:33 )  <0.015 ng/mL / x     / 25 U/L / x     / 1.2 ng/mL  CARDIAC MARKERS ( 29 Jul 2018 18:51 )  0.022 ng/mL / x     / 12 U/L / x     / 1.1 ng/mL        Serum Pro-Brain Natriuretic Peptide: 3084 pg/mL (07-29-18 @ 18:51)          MICROBIOLOGY: (if applicable)    RADIOLOGY & ADDITIONAL STUDIES:  EKG:   CXR:  < from: Xray Chest 1 View AP/PA (07.29.18 @ 18:14) >    EXAM:  XR CHEST AP OR PA 1V                            PROCEDURE DATE:  07/29/2018          INTERPRETATION:  CLINICAL STATEMENT: Chest Pain.    TECHNIQUE: AP view of the chest.    COMPARISON: 12/13/2016    FINDINGS/  IMPRESSION:  Study limited due to rotation.    Right cardiac device. New since prior exam.    Small left pleural effusion with atelectasis left lung base.    Heart size cannot be accurately assessed in this projection.                  LENCHO LEIJA M.D., ATTENDING RADIOLOGIST  This document has been electronically signed. Jul 30 2018  7:08AM                < end of copied text >    ECHO:    IMPRESSION: 85y Male PAST MEDICAL & SURGICAL HISTORY:  Stented coronary artery  Pacemaker  CKD (chronic kidney disease)  Hypertension  AICD (automatic cardioverter/defibrillator) present  CAD (coronary artery disease)  Kidney carcinoma: s/p right nephrectomy  Prostate cancer  Kidney carcinoma: s/p right nephrectomy  Hernia: Hernioplasty  Prostate: prostatectomy       82 y/o male from home, ambulates independently with PMHX of CAD, + Stent placed in 2009 on Plavix QOD, AICD placed in 2011, Prostate CA, S/P surgery and RTX, Rt Nephrectomy for Rt Renal cell CA, Htn, HLD  Asthma stable, CKD ( baseline creatinine is 2.0) , Bradycardia (s/p Medtronic AICD), asthma, CKD, and COPD (not on home O2) S/P Colonoscopy in March 2015 c/w Diverticulosis , HX of Polyps removed, PVD ( s/p balloon left leg stent) came with complain of dyspnea and blurry vision since 1 week. Patient states he is developing worsening shortness of breath since 1 week to an extent that he is unable to walk from room to kitchen, on exertion and on talking. He also developed blurry vision in Rt eye since 1 week with purulent discharge which progressively got worse since 1 week. He denies chest pain, fever, chills, nausea, vomiting, diarrhea, constipation or any other complains.     SOB due to HF exacerbation    +trace pleural effusion  +blurry vision R eye, conjunctivitis?    SUGGESTION:     - diurese   - bronchodilators, o2 supp prn   - repeat cxr in 36hrs   - f/u bcx   - DVT and GI prophylaxis. CHIEF COMPLAINT: Patient is a 85y old  Male who presents with a chief complaint of Shortness of breath (30 Jul 2018 04:10)      HPI:  82 y/o male from home, ambulates independently with PMHX of CAD, + Stent placed in 2009 on Plavix QOD, AICD placed in 2011, Prostate CA, S/P surgery and RTX, Rt Nephrectomy for Rt Renal cell CA, Htn, HLD  Asthma stable, CKD ( baseline creatinine is 2.0) , Bradycardia (s/p Medtronic AICD), asthma, CKD, and COPD (not on home O2) S/P Colonoscopy in March 2015 c/w Diverticulosis , HX of Polyps removed, PVD ( s/p balloon left leg stent) came with complain of dyspnea and blurry vision since 1 week. Patient states he is developing worsening shortness of breath since 1 week to an extent that he is unable to walk from room to kitchen, on exertion and on talking. He also developed blurry vision in Rt eye since 1 week with purulent discharge which progressively got worse since 1 week. He denies chest pain, fever, chills, nausea, vomiting, diarrhea, constipation or any other complains.     In ED, patient's vital signs were remarkable for BP of 168/50, HR of 60, Temp normal, Labs were significant for HB 9.5, Cardiac enzymes x 2 negative, Cr: 2.26. EKG showed NSR, RBBB, No ST-T wave changes, paced rhythm, Patient was given 80 IV lasix push on admission (30 Jul 2018 04:10)   Patient seen and examined.     PAST MEDICAL & SURGICAL HISTORY:  Stented coronary artery  Pacemaker  CKD (chronic kidney disease)  Hypertension  AICD (automatic cardioverter/defibrillator) present  CAD (coronary artery disease)  Kidney carcinoma: s/p right nephrectomy  Prostate cancer  Kidney carcinoma: s/p right nephrectomy  Hernia: Hernioplasty  Prostate: prostatectomy      Allergies    aspirin (Anaphylaxis)    Intolerances        MEDICATIONS  (STANDING):  cloNIDine 0.2 milliGRAM(s) Oral three times a day  clopidogrel Tablet 75 milliGRAM(s) Oral daily  furosemide   Injectable 40 milliGRAM(s) IV Push daily  heparin  Injectable 5000 Unit(s) SubCutaneous every 8 hours  hydrALAZINE 25 milliGRAM(s) Oral every 8 hours  metoprolol succinate ER 50 milliGRAM(s) Oral daily  neomycin/BACItracin/polymyxin Ointment 1 Application(s) Right EYE every 6 hours  simvastatin 10 milliGRAM(s) Oral at bedtime      MEDICATIONS  (PRN):   Medications up to date at time of exam.    FAMILY HISTORY:  No pertinent family history in first degree relatives      SOCIAL HISTORY  Smoking History: [   ] smoking/smoke exposure, [ x  ] former smoker, [  ] denies smoking  Living Condition: [   ] apartment, [   ] private house  Work History:   Travel History: denies recent travel  Illicit Substance Use: denies  Alcohol Use: denies    REVIEW OF SYSTEMS:    CONSTITUTIONAL:  denies fevers, chills, sweats, weight loss    HEENT:  denies diplopia or blurred vision, sore throat or runny nose.    CARDIOVASCULAR:  denies pressure, squeezing, tightness, or heaviness about the chest; no palpitations.    RESPIRATORY:  denies SOB, cough, CLIFTON, wheezing.    GASTROINTESTINAL:  denies abdominal pain, nausea, vomiting or diarrhea.    GENITOURINARY: denies dysuria, frequency or urgency.    NEUROLOGIC:  denies numbness, tingling, seizures or weakness.    PSYCHIATRIC:  denies disorder of thought or mood.    MSK: denies swelling, redness      PHYSICAL EXAMINATION:    GENERAL: The patient is a well-developed, well-nourished, in no apparent distress.     Vital Signs Last 24 Hrs  T(C): 37.1 (30 Jul 2018 13:25), Max: 37.1 (30 Jul 2018 13:25)  T(F): 98.7 (30 Jul 2018 13:25), Max: 98.7 (30 Jul 2018 13:25)  HR: 60 (30 Jul 2018 13:51) (58 - 60)  BP: 158/77 (30 Jul 2018 13:51) (155/48 - 174/82)  BP(mean): --  RR: 18 (30 Jul 2018 13:25) (16 - 24)  SpO2: 99% (30 Jul 2018 13:51) (96% - 99%)   (if applicable)    Chest Tube (if applicable)    HEENT: Head is normocephalic and atraumatic. .    NECK: Supple, no palpable adenopathy.    LUNGS: Clear to auscultation, no wheezing, rales, or rhonchi. +crackles at bases    HEART: Regular rate and rhythm without murmur.    ABDOMEN: Soft, nontender, and nondistended.  No hepatosplenomegaly is noted.    EXTREMITIES: Without any cyanosis, clubbing, rash, lesions or +B/L LE edema.    NEUROLOGIC: Awake, alert.    SKIN: Warm, dry, good turgor.      LABS:                        9.4    4.8   )-----------( 150      ( 30 Jul 2018 04:34 )             28.0     07-30    143  |  105  |  51<H>  ----------------------------<  136<H>  3.6   |  32<H>  |  2.26<H>    Ca    9.7      30 Jul 2018 04:34  Phos  3.5     07-30  Mg     2.3     07-30    TPro  6.6  /  Alb  3.5  /  TBili  2.5<H>  /  DBili  x   /  AST  23  /  ALT  17  /  AlkPhos  65  07-30    PT/INR - ( 29 Jul 2018 18:51 )   PT: 10.9 sec;   INR: 1.00 ratio         PTT - ( 29 Jul 2018 18:51 )  PTT:30.0 sec      CARDIAC MARKERS ( 30 Jul 2018 11:03 )  0.024 ng/mL / x     / x     / x     / x      CARDIAC MARKERS ( 30 Jul 2018 04:33 )  <0.015 ng/mL / x     / 25 U/L / x     / 1.2 ng/mL  CARDIAC MARKERS ( 29 Jul 2018 18:51 )  0.022 ng/mL / x     / 12 U/L / x     / 1.1 ng/mL        Serum Pro-Brain Natriuretic Peptide: 3084 pg/mL (07-29-18 @ 18:51)          MICROBIOLOGY: (if applicable)    RADIOLOGY & ADDITIONAL STUDIES:  EKG:   CXR:  < from: Xray Chest 1 View AP/PA (07.29.18 @ 18:14) >    EXAM:  XR CHEST AP OR PA 1V                            PROCEDURE DATE:  07/29/2018          INTERPRETATION:  CLINICAL STATEMENT: Chest Pain.    TECHNIQUE: AP view of the chest.    COMPARISON: 12/13/2016    FINDINGS/  IMPRESSION:  Study limited due to rotation.    Right cardiac device. New since prior exam.    Small left pleural effusion with atelectasis left lung base.    Heart size cannot be accurately assessed in this projection.                  LENCHO LEIJA M.D., ATTENDING RADIOLOGIST  This document has been electronically signed. Jul 30 2018  7:08AM                < end of copied text >    ECHO:    IMPRESSION: 85y Male PAST MEDICAL & SURGICAL HISTORY:  Stented coronary artery  Pacemaker  CKD (chronic kidney disease)  Hypertension  AICD (automatic cardioverter/defibrillator) present  CAD (coronary artery disease)  Kidney carcinoma: s/p right nephrectomy  Prostate cancer  Kidney carcinoma: s/p right nephrectomy  Hernia: Hernioplasty  Prostate: prostatectomy       82 y/o male from home, ambulates independently with PMHX of CAD, + Stent placed in 2009 on Plavix QOD, AICD placed in 2011, Prostate CA, S/P surgery and RTX, Rt Nephrectomy for Rt Renal cell CA, Htn, HLD  Asthma stable, CKD ( baseline creatinine is 2.0) , Bradycardia (s/p [employer]tronic AICD), asthma, CKD, and COPD (not on home O2) S/P Colonoscopy in March 2015 c/w Diverticulosis , HX of Polyps removed, PVD ( s/p balloon left leg stent) came with complain of dyspnea and blurry vision since 1 week. Patient states he is developing worsening shortness of breath since 1 week to an extent that he is unable to walk from room to kitchen, on exertion and on talking. He also developed blurry vision in Rt eye since 1 week with purulent discharge which progressively got worse since 1 week. He denies chest pain, fever, chills, nausea, vomiting, diarrhea, constipation or any other complains.     SOB due to HF exacerbation    +trace pleural effusion  +blurry vision R eye, conjunctivitis?    SUGGESTION:     - diurese   - bronchodilators, o2 supp prn   - repeat cxr in 36hrs   - f/u bcx   - DVT and GI prophylaxis.     Agree with above assessment and plan as transcribed.

## 2018-07-31 LAB
ANION GAP SERPL CALC-SCNC: 6 MMOL/L — SIGNIFICANT CHANGE UP (ref 5–17)
BUN SERPL-MCNC: 49 MG/DL — HIGH (ref 7–18)
CALCIUM SERPL-MCNC: 9.8 MG/DL — SIGNIFICANT CHANGE UP (ref 8.4–10.5)
CHLORIDE SERPL-SCNC: 104 MMOL/L — SIGNIFICANT CHANGE UP (ref 96–108)
CO2 SERPL-SCNC: 33 MMOL/L — HIGH (ref 22–31)
CREAT ?TM UR-MCNC: 29 MG/DL — SIGNIFICANT CHANGE UP
CREAT SERPL-MCNC: 2.34 MG/DL — HIGH (ref 0.5–1.3)
GLUCOSE SERPL-MCNC: 131 MG/DL — HIGH (ref 70–99)
HCT VFR BLD CALC: 31.9 % — LOW (ref 39–50)
HGB BLD-MCNC: 10.5 G/DL — LOW (ref 13–17)
MCHC RBC-ENTMCNC: 29.8 PG — SIGNIFICANT CHANGE UP (ref 27–34)
MCHC RBC-ENTMCNC: 32.9 GM/DL — SIGNIFICANT CHANGE UP (ref 32–36)
MCV RBC AUTO: 90.5 FL — SIGNIFICANT CHANGE UP (ref 80–100)
PLATELET # BLD AUTO: 173 K/UL — SIGNIFICANT CHANGE UP (ref 150–400)
POTASSIUM SERPL-MCNC: 3.9 MMOL/L — SIGNIFICANT CHANGE UP (ref 3.5–5.3)
POTASSIUM SERPL-SCNC: 3.9 MMOL/L — SIGNIFICANT CHANGE UP (ref 3.5–5.3)
POTASSIUM UR-SCNC: 17 MMOL/L — LOW (ref 25–125)
PROT ?TM UR-MCNC: 33 MG/DL — HIGH (ref 0–12)
RBC # BLD: 3.53 M/UL — LOW (ref 4.2–5.8)
RBC # FLD: 13.3 % — SIGNIFICANT CHANGE UP (ref 10.3–14.5)
SODIUM SERPL-SCNC: 143 MMOL/L — SIGNIFICANT CHANGE UP (ref 135–145)
SODIUM UR-SCNC: 107 MMOL/L — SIGNIFICANT CHANGE UP (ref 40–220)
WBC # BLD: 6.4 K/UL — SIGNIFICANT CHANGE UP (ref 3.8–10.5)
WBC # FLD AUTO: 6.4 K/UL — SIGNIFICANT CHANGE UP (ref 3.8–10.5)

## 2018-07-31 PROCEDURE — 76770 US EXAM ABDO BACK WALL COMP: CPT | Mod: 26

## 2018-07-31 RX ORDER — DOCUSATE SODIUM 100 MG
100 CAPSULE ORAL THREE TIMES A DAY
Qty: 0 | Refills: 0 | Status: DISCONTINUED | OUTPATIENT
Start: 2018-07-31 | End: 2018-08-04

## 2018-07-31 RX ORDER — FUROSEMIDE 40 MG
60 TABLET ORAL DAILY
Qty: 0 | Refills: 0 | Status: DISCONTINUED | OUTPATIENT
Start: 2018-07-31 | End: 2018-08-02

## 2018-07-31 RX ADMIN — Medication 0.2 MILLIGRAM(S): at 13:22

## 2018-07-31 RX ADMIN — Medication 100 MILLIGRAM(S): at 13:22

## 2018-07-31 RX ADMIN — Medication 1 APPLICATION(S): at 23:47

## 2018-07-31 RX ADMIN — Medication 25 MILLIGRAM(S): at 06:33

## 2018-07-31 RX ADMIN — Medication 25 MILLIGRAM(S): at 22:18

## 2018-07-31 RX ADMIN — HEPARIN SODIUM 5000 UNIT(S): 5000 INJECTION INTRAVENOUS; SUBCUTANEOUS at 22:18

## 2018-07-31 RX ADMIN — Medication 50 MILLIGRAM(S): at 06:33

## 2018-07-31 RX ADMIN — Medication 1 APPLICATION(S): at 17:24

## 2018-07-31 RX ADMIN — Medication 1 APPLICATION(S): at 11:26

## 2018-07-31 RX ADMIN — Medication 100 MILLIGRAM(S): at 22:18

## 2018-07-31 RX ADMIN — SIMVASTATIN 10 MILLIGRAM(S): 20 TABLET, FILM COATED ORAL at 00:43

## 2018-07-31 RX ADMIN — Medication 0.2 MILLIGRAM(S): at 22:18

## 2018-07-31 RX ADMIN — HEPARIN SODIUM 5000 UNIT(S): 5000 INJECTION INTRAVENOUS; SUBCUTANEOUS at 13:22

## 2018-07-31 RX ADMIN — Medication 0.2 MILLIGRAM(S): at 00:43

## 2018-07-31 RX ADMIN — Medication 1 APPLICATION(S): at 06:37

## 2018-07-31 RX ADMIN — CLOPIDOGREL BISULFATE 75 MILLIGRAM(S): 75 TABLET, FILM COATED ORAL at 11:26

## 2018-07-31 RX ADMIN — Medication 25 MILLIGRAM(S): at 00:43

## 2018-07-31 RX ADMIN — SIMVASTATIN 10 MILLIGRAM(S): 20 TABLET, FILM COATED ORAL at 22:19

## 2018-07-31 RX ADMIN — Medication 1 APPLICATION(S): at 01:35

## 2018-07-31 RX ADMIN — HEPARIN SODIUM 5000 UNIT(S): 5000 INJECTION INTRAVENOUS; SUBCUTANEOUS at 06:37

## 2018-07-31 RX ADMIN — HEPARIN SODIUM 5000 UNIT(S): 5000 INJECTION INTRAVENOUS; SUBCUTANEOUS at 00:44

## 2018-07-31 RX ADMIN — Medication 25 MILLIGRAM(S): at 13:22

## 2018-07-31 RX ADMIN — Medication 0.2 MILLIGRAM(S): at 06:37

## 2018-07-31 RX ADMIN — Medication 60 MILLIGRAM(S): at 17:24

## 2018-07-31 RX ADMIN — Medication 40 MILLIGRAM(S): at 06:37

## 2018-07-31 NOTE — CONSULT NOTE ADULT - ASSESSMENT
A/P:  1.CKD: stage 4 ,r/o secondary to htn plus r/o FSGS post Rt .Nephrectomy induced  -pts egfr is mildly below his baseline ,r/o secondary to chf  -Keep patient euvolemic and renal diet  -Avoid Nephrotoxic Meds/ Agents such as (NSAIDs, IV contrast, Aminoglycosides such as gentamicin, -Gadolinium contrast, Phosphate containing enemas, etc..)  -Adjust Medications according to eGFR  -f/u bmp daily  -f/u urine studies  2.HTN: bp is controlled  -keep bp>110/70 and <140/80  3.EDEMA: mild  -add Lasix 60 mg daily  -add fluid restriction to 1 liter per day  4.Alkalosis: secondary to diuretics, r/o primary Resp.acidosis  -suggest ABG to check ph  -f/u co2 daily  5.ANEMIA: mild  -f/u Hb daily  -add epogen if Hb <10 gm

## 2018-07-31 NOTE — CONSULT NOTE ADULT - SUBJECTIVE AND OBJECTIVE BOX
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 Patient states he is developing worsening shortness of breath since 1 week to an extent that he is unable to walk from room to kitchen, on exertion and on talking. He also developed blurry vision in Rt eye since 1 week with purulent discharge which progressively got worse since 1 week. He denies chest pain, fever, chills, nausea, vomiting, diarrhea, constipation or any other complains.     In ED, patient's vital signs were remarkable for BP of 168/50, HR of 60, Temp normal, Labs were significant for HB 9.5, Cardiac enzymes x 2 negative, Cr: 2.26. EKG showed NSR, RBBB, No ST-T wave changes, paced rhythm, Patient was given 80 IV lasix push on admission (30 Jul 2018 04:10)    pts current chart reviewed and case discussed with resident  pt denies pmh of renal ds, proteinuria, renal stones, recurrent uti or nephrotic edema or nephrotic syndrome  pt give pmh of retinopathy and s/p laser treatment  pt denies Nsaids use or otc other nephrotoxic supplements  PAST MEDICAL & SURGICAL HISTORY:  Stented coronary artery  Pacemaker  CKD (chronic kidney disease)  Hypertension  AICD (automatic cardioverter/defibrillator) present  CAD (coronary artery disease)  Kidney carcinoma: s/p right nephrectomy  Prostate cancer  Kidney carcinoma: s/p right nephrectomy  Hernia: Hernioplasty  Prostate: prostatectomy      Home Medications: Reviewed    MEDICATIONS  (STANDING):  cloNIDine 0.2 milliGRAM(s) Oral three times a day  clopidogrel Tablet 75 milliGRAM(s) Oral daily  docusate sodium 100 milliGRAM(s) Oral three times a day  heparin  Injectable 5000 Unit(s) SubCutaneous every 8 hours  hydrALAZINE 25 milliGRAM(s) Oral every 8 hours  metoprolol succinate ER 50 milliGRAM(s) Oral daily  neomycin/BACItracin/polymyxin Ointment 1 Application(s) Right EYE every 6 hours  simvastatin 10 milliGRAM(s) Oral at bedtime    MEDICATIONS  (PRN):      Allergies    aspirin (Anaphylaxis)    Intolerances        SOCIAL HISTORY:  Alcohol use [X ] No  [ ] Yes  Smoking  [ X] No  [ ] Yes  Drug Abuse [X ] No  [ ] Yes  Tattoo [X ] No  [ ] Yes  History of Blood transfusion [ X] No  [ ] Yes    FAMILY HISTORY:  No pertinent family history in first degree relatives      Diabetes [ ]  Hypertension [ ]  Kidney Stones [ ]  Heart Disease [ ]  Hyperlipidemia [ ]  Cancer [ ]    REVIEW OF SYSTEMS:  CONSTITUTIONAL: No weakness, fevers or chills  EYES/ENT: No visual changes;  No vertigo or throat pain   NECK: No pain or stiffness  RESPIRATORY: No cough, wheezing, hemoptysis; No shortness of breath  CARDIOVASCULAR: No chest pain or palpitations  GASTROINTESTINAL: No abdominal or epigastric pain. No nausea, vomiting, or hematemesis; No diarrhea or constipation. No melena or hematochezia.  GENITOURINARY: No dysuria, frequency or hematuria  NEUROLOGICAL: No numbness or weakness  SKIN: No itching, burning, rashes, or lesions   All other review of systems is negative unless indicated above    Vital Signs  T(F): 98.2 (07-31-18 @ 11:33), Max: 99.2 (07-30-18 @ 21:32)  HR: 60 (07-31-18 @ 13:20) (58 - 73)  BP: 124/46 (07-31-18 @ 13:20) (124/46 - 183/62)  ABP: --  RR: 18 (07-31-18 @ 11:33) (16 - 18)  SpO2: 99% (07-31-18 @ 11:33) (98% - 99%)  Wt(kg): --  CVP(cm H2O): --  CO: --  PCWP: --    I and O's:    07-30 @ 07:01  -  07-31 @ 07:00  --------------------------------------------------------  IN:    Oral Fluid: 470 mL  Total IN: 470 mL    OUT:    Voided: 450 mL  Total OUT: 450 mL    Total NET: 20 mL        Daily     Daily     PHYSICAL EXAM:  Constitutional: well developed, well nourished  and in nad  HEENT: PERRLA,  no icteric sclera and mild pallor of conjunctiva noted  Neck: No JVD, thyromegaly or adenopathy  Respiratory: reduced air entry lower lungs with no rales, wheezing or rhonchi  Cardiovascular: S1 and S2 normally heard  Gastrointestinal: soft, nondistended, nontender and normal bowel sounds heard  Extremities: No peripheral edema or cyanosis  Neurological: A/O x 3, no focal deficits  Psychiatric: Normal mood, normal affect  : No flank tenderness  Skin: No rashes        LABS:                        10.5   6.4   )-----------( 173      ( 31 Jul 2018 06:32 )             31.9     3.5  --        07-31    143  |  104  |  49<H>  ----------------------------<  131<H>  3.9   |  33<H>  |  2.34<H>  07-30    143  |  105  |  51<H>  ----------------------------<  136<H>  3.6   |  32<H>  |  2.26<H>  07-29    143  |  105  |  52<H>  ----------------------------<  139<H>  3.6   |  31  |  2.48<H>    Ca    9.8      31 Jul 2018 06:32  Ca    9.7      30 Jul 2018 04:34  Ca    9.6      29 Jul 2018 18:51  Phos  3.5     07-30  Mg     2.3     07-30    TPro  6.6  /  Alb  3.5  /  TBili  2.5<H>  /  DBili  x   /  AST  23  /  ALT  17  /  AlkPhos  65  07-30  TPro  6.7  /  Alb  3.4<L>  /  TBili  2.4<H>  /  DBili  x   /  AST  16  /  ALT  15  /  AlkPhos  62  07-29          URINE STUDIES:                    RADIOLOGY & ADDITIONAL STUDIES: Patient is a 85y Male whom presented to the hospital with sob, being treated for chf exacerbation, noted to have abnormal renal function.    Medical HPI:  82 y/o male from home, ambulates independently with PMHX of CAD, + Stent placed in 2009 on Plavix QOD, AICD placed in 2011, Prostate CA, S/P surgery and RTX, Rt Nephrectomy for Rt Renal cell CA, Htn, HLD  Asthma stable, CKD ( baseline creatinine is 2.0) , Bradycardia (s/p [employer] AICD), asthma, CKD, and COPD (not on home O2) S/P Colonoscopy in March 2015 c/w Diverticulosis , HX of Polyps removed, PVD ( s/p balloon left leg stent) came with complain of dyspnea and blurry vision since 1 week. Patient states he is developing worsening shortness of breath since 1 week to an extent that he is unable to walk from room to kitchen, on exertion and on talking. He also developed blurry vision in Rt eye since 1 week with purulent discharge which progressively got worse since 1 week. He denies chest pain, fever, chills, nausea, vomiting, diarrhea, constipation or any other complains.     In ED, patient's vital signs were remarkable for BP of 168/50, HR of 60, Temp normal, Labs were significant for HB 9.5, Cardiac enzymes x 2 negative, Cr: 2.26. EKG showed NSR, RBBB, No ST-T wave changes, paced rhythm, Patient was given 80 IV lasix push on admission (30 Jul 2018 04:10)  Renal HPI:  pts current chart reviewed and case discussed with resident  pt is known to have ckd -stage 4 as pe rold labs.pt is being folowed by Dr Loni Cantu(last visit last month)  pt denies pmh of  proteinuria, renal stones, recurrent uti or nephrotic edema or nephrotic syndrome  pt denies Nsaids use or otc other nephrotoxic supplements recently  pt currently denies cp,cough ,nausea ,vomiting ,skin rash or gu symptons    PAST MEDICAL & SURGICAL HISTORY:  Stented coronary artery  Pacemaker  CKD (chronic kidney disease)  Hypertension  AICD (automatic cardioverter/defibrillator) present  CAD (coronary artery disease)  Kidney carcinoma: s/p right nephrectomy  Prostate cancer  Kidney carcinoma: s/p right nephrectomy  Hernia: Hernioplasty  Prostate: prostatectomy      Home Medications: Reviewed    MEDICATIONS  (STANDING):  cloNIDine 0.2 milliGRAM(s) Oral three times a day  clopidogrel Tablet 75 milliGRAM(s) Oral daily  docusate sodium 100 milliGRAM(s) Oral three times a day  heparin  Injectable 5000 Unit(s) SubCutaneous every 8 hours  hydrALAZINE 25 milliGRAM(s) Oral every 8 hours  metoprolol succinate ER 50 milliGRAM(s) Oral daily  neomycin/BACItracin/polymyxin Ointment 1 Application(s) Right EYE every 6 hours  simvastatin 10 milliGRAM(s) Oral at bedtime    MEDICATIONS  (PRN):      Allergies    aspirin (Anaphylaxis)    Intolerances        SOCIAL HISTORY:  Alcohol use [X ] No  [ ] Yes  Smoking  [ X] No  [ ] Yes  Drug Abuse [X ] No  [ ] Yes  Tattoo [X ] No  [ ] Yes  History of Blood transfusion [ X] No  [ ] Yes    FAMILY HISTORY:  No pertinent family history in first degree relatives          REVIEW OF SYSTEMS:  CONSTITUTIONAL: No weakness, fevers or chills  EYES/ENT: Rt Eye is swollen with mild redness,+blurred vision, No vertigo or throat pain   NECK: No pain or stiffness  RESPIRATORY: No cough, wheezing, hemoptysis; +shortness of breath  CARDIOVASCULAR: No chest pain or palpitations  GASTROINTESTINAL: No abdominal or epigastric pain. No nausea, vomiting, or hematemesis; No diarrhea or constipation. No melena or hematochezia.  GENITOURINARY: No dysuria, frequency or hematuria  NEUROLOGICAL: No numbness or weakness  SKIN: No itching, burning, rashes, or lesions   All other review of systems is negative unless indicated above    Vital Signs  T(F): 98.2 (07-31-18 @ 11:33), Max: 99.2 (07-30-18 @ 21:32)  HR: 60 (07-31-18 @ 13:20) (58 - 73)  BP: 124/46 (07-31-18 @ 13:20) (124/46 - 183/62)  ABP: --  RR: 18 (07-31-18 @ 11:33) (16 - 18)  SpO2: 99% (07-31-18 @ 11:33) (98% - 99%)  Wt(kg): --  CVP(cm H2O): --  CO: --  PCWP: --    I and O's:    07-30 @ 07:01  -  07-31 @ 07:00  --------------------------------------------------------  IN:    Oral Fluid: 470 mL  Total IN: 470 mL    OUT:    Voided: 450 mL  Total OUT: 450 mL    Total NET: 20 mL        Daily     Daily     PHYSICAL EXAM:  Constitutional: well developed, well nourished  and in nad  HEENT: PERRLA,  no icteric sclera and mild pallor of conjunctiva noted  Neck: No JVD, thyromegaly or adenopathy  Respiratory: reduced air entry lower lungs with no rales, wheezing or rhonchi  Cardiovascular: S1 and S2 normally heard  Gastrointestinal: soft, nondistended, nontender and normal bowel sounds heard  Extremities: mild peripheral edema noted in both LE s , no  cyanosis  Neurological: A/O x 3, no focal deficits  Psychiatric: Normal mood, normal affect  : No flank tenderness  Skin: No rashes        LABS:                        10.5   6.4   )-----------( 173      ( 31 Jul 2018 06:32 )             31.9     3.5  --        07-31    143  |  104  |  49<H>  ----------------------------<  131<H>  3.9   |  33<H>  |  2.34<H>  07-30    143  |  105  |  51<H>  ----------------------------<  136<H>  3.6   |  32<H>  |  2.26<H>  07-29    143  |  105  |  52<H>  ----------------------------<  139<H>  3.6   |  31  |  2.48<H>    Ca    9.8      31 Jul 2018 06:32  Ca    9.7      30 Jul 2018 04:34  Ca    9.6      29 Jul 2018 18:51  Phos  3.5     07-30  Mg     2.3     07-30    TPro  6.6  /  Alb  3.5  /  TBili  2.5<H>  /  DBili  x   /  AST  23  /  ALT  17  /  AlkPhos  65  07-30  TPro  6.7  /  Alb  3.4<L>  /  TBili  2.4<H>  /  DBili  x   /  AST  16  /  ALT  15  /  AlkPhos  62  07-29          URINE STUDIES:    Microalbumin/Creatinine, Urine (12.16.16 @ 09:30)    Creatinine, Random Urine: 21: Reference Ranges have NOT been established for random urine analytes due  to variability in fluid intake and concentration. mg/dL    Microalbumin/Creatinine Ratio: 366 ug/mg    Microalbumin.: 7.7 mg/dL                    RADIOLOGY & ADDITIONAL STUDIES:      < from: US Kidney and Bladder (07.31.18 @ 13:09) >  EXAM:  US KIDNEYS AND BLADDER                            PROCEDURE DATE:  07/31/2018          INTERPRETATION:  CLINICAL STATEMENT: Elevated creatinine. History of   right nephrectomy    TECHNIQUE: Ultrasound of the kidneys and urinary bladder.    COMPARISON: 12/15/2016    FINDINGS:      The left kidney measures 10.7 cm in length. Echogenic kidney noted   suggesting medical renal disease. .    There is no hydronephrosis. Multiple cysts are noted largest measuring up   to 6.5 cm.    Evaluation ofurinary bladder limited due to underdistention.    IMPRESSION:  No left hydronephrosis.     Right nephrectom    < end of copied text >      < from: Xray Chest 1 View AP/PA (07.29.18 @ 18:14) >  EXAM:  XR CHEST AP OR PA 1V                            PROCEDURE DATE:  07/29/2018          INTERPRETATION:  CLINICAL STATEMENT: Chest Pain.    TECHNIQUE: AP view of the chest.    COMPARISON: 12/13/2016    FINDINGS/  IMPRESSION:  Study limited due to rotation.    Right cardiac device. New since prior exam.    Small left pleural effusion with atelectasis left lung base.    Heart size cannot be accurately assessed in this projection.      < end of copied text >

## 2018-07-31 NOTE — PROGRESS NOTE ADULT - SUBJECTIVE AND OBJECTIVE BOX
Time of Visit:  Patient seen and examined.     MEDICATIONS  (STANDING):  cloNIDine 0.2 milliGRAM(s) Oral three times a day  clopidogrel Tablet 75 milliGRAM(s) Oral daily  heparin  Injectable 5000 Unit(s) SubCutaneous every 8 hours  hydrALAZINE 25 milliGRAM(s) Oral every 8 hours  metoprolol succinate ER 50 milliGRAM(s) Oral daily  neomycin/BACItracin/polymyxin Ointment 1 Application(s) Right EYE every 6 hours  simvastatin 10 milliGRAM(s) Oral at bedtime      MEDICATIONS  (PRN):       Medications up to date at time of exam.    ROS: No fever, chills, cough, congestion. Verbalized of SOB on exertion.  PHYSICAL EXAMINATION:    Vital Signs Last 24 Hrs  T(C): 36.8 (31 Jul 2018 11:33), Max: 37.3 (30 Jul 2018 21:32)  T(F): 98.2 (31 Jul 2018 11:33), Max: 99.2 (30 Jul 2018 21:32)  HR: 60 (31 Jul 2018 11:33) (58 - 73)  BP: 153/53 (31 Jul 2018 11:33) (137/47 - 183/62)  BP(mean): --  RR: 18 (31 Jul 2018 11:33) (16 - 18)  SpO2: 99% (31 Jul 2018 11:33) (98% - 99%)   (if applicable)    General; Alert and oriented. Able to answer question with no SOB, lying in bed at rest on exam. No acute distress.     HEENT: Head is normocephalic and atraumatic. Extraocular muscles are intact. Moist mucosa. Tongue midline.      NECK: Supple, no palpable adenopathy.    LUNGS:  Few B/L Lower lobe rales. No use of accessory muscle.    HEART: S1 S2 Regular rate and no click/ rub. + AICD.    ABDOMEN: Soft, nontender, and nondistended.  No hepatosplenomegaly is noted. Active bowel sounds.     EXTREMITIES: Without any cyanosis, clubbing, rash, lesions or edema.    NEUROLOGIC: Awake, alert, oriented.     SKIN: Warm and moist. Non diaphoretic.       LABS:                        10.5   6.4   )-----------( 173      ( 31 Jul 2018 06:32 )             31.9     07-31    143  |  104  |  49<H>  ----------------------------<  131<H>  3.9   |  33<H>  |  2.34<H>    Ca    9.8      31 Jul 2018 06:32  Phos  3.5     07-30  Mg     2.3     07-30    TPro  6.6  /  Alb  3.5  /  TBili  2.5<H>  /  DBili  x   /  AST  23  /  ALT  17  /  AlkPhos  65  07-30    PT/INR - ( 29 Jul 2018 18:51 )   PT: 10.9 sec;   INR: 1.00 ratio         PTT - ( 29 Jul 2018 18:51 )  PTT:30.0 sec      CARDIAC MARKERS ( 30 Jul 2018 11:03 )  0.024 ng/mL / x     / x     / x     / x      CARDIAC MARKERS ( 30 Jul 2018 04:33 )  <0.015 ng/mL / x     / 25 U/L / x     / 1.2 ng/mL  CARDIAC MARKERS ( 29 Jul 2018 18:51 )  0.022 ng/mL / x     / 12 U/L / x     / 1.1 ng/mL        Serum Pro-Brain Natriuretic Peptide: 3084 pg/mL (07-29-18 @ 18:51)    RADIOLOGY & ADDITIONAL STUDIES:  EKG:   CXR: < from: Xray Chest 1 View AP/PA (07.29.18 @ 18:14) >  ROCEDURE DATE:  07/29/2018          INTERPRETATION:  CLINICAL STATEMENT: Chest Pain.    TECHNIQUE: AP view of the chest.    COMPARISON: 12/13/2016    FINDINGS/  IMPRESSION:  Study limited due to rotation.    Right cardiac device. New since prior exam.    Small left pleural effusion with atelectasis left lung base.    Heart size cannot be accurately assessed in this projection.      PAST MEDICAL & SURGICAL HISTORY:  Stented coronary artery  Pacemaker  CKD (chronic kidney disease)  Hypertension  AICD (automatic cardioverter/defibrillator) present    CAD (coronary artery disease)  Kidney carcinoma: s/p right nephrectomy  Prostate cancer  Kidney carcinoma: s/p right nephrectomy  Hernia: Hernioplasty  Prostate: prostatectomy     Impression; 84 Y/O Male with prior mentioned multiple chronic conditions. Presented with SOB on exertion and talking x 1 week , patient unable to walk from room to kitchen. SOB secondary to Heart failure and CXR with trace of Pleural Effusion. Has rales due to COPD.        Suggestion;  Oxygen supplementation if needed for SOB. O2 saturation 97% room air.  Start DuoNeb Q 6 hours.  Repeat CXR in A.M.  DVT/ GI prophylactic.   OOB to chair. Time of Visit:  Patient seen and examined.     MEDICATIONS  (STANDING):  cloNIDine 0.2 milliGRAM(s) Oral three times a day  clopidogrel Tablet 75 milliGRAM(s) Oral daily  heparin  Injectable 5000 Unit(s) SubCutaneous every 8 hours  hydrALAZINE 25 milliGRAM(s) Oral every 8 hours  metoprolol succinate ER 50 milliGRAM(s) Oral daily  neomycin/BACItracin/polymyxin Ointment 1 Application(s) Right EYE every 6 hours  simvastatin 10 milliGRAM(s) Oral at bedtime      MEDICATIONS  (PRN):       Medications up to date at time of exam.    ROS: No fever, chills, cough, congestion. Verbalized of SOB on exertion.  PHYSICAL EXAMINATION:    Vital Signs Last 24 Hrs  T(C): 36.8 (31 Jul 2018 11:33), Max: 37.3 (30 Jul 2018 21:32)  T(F): 98.2 (31 Jul 2018 11:33), Max: 99.2 (30 Jul 2018 21:32)  HR: 60 (31 Jul 2018 11:33) (58 - 73)  BP: 153/53 (31 Jul 2018 11:33) (137/47 - 183/62)  BP(mean): --  RR: 18 (31 Jul 2018 11:33) (16 - 18)  SpO2: 99% (31 Jul 2018 11:33) (98% - 99%)   (if applicable)    General; Alert and oriented. Able to answer question with no SOB, lying in bed at rest on exam. No acute distress.     HEENT: Head is normocephalic and atraumatic. Extraocular muscles are intact. Moist mucosa. Tongue midline.      NECK: Supple, no palpable adenopathy.    LUNGS:  Few B/L Lower lobe rales. No use of accessory muscle.    HEART: S1 S2 Regular rate and no click/ rub. + AICD.    ABDOMEN: Soft, nontender, and nondistended.  No hepatosplenomegaly is noted. Active bowel sounds.     EXTREMITIES: Without any cyanosis, clubbing, rash, lesions or edema.    NEUROLOGIC: Awake, alert, oriented.     SKIN: Warm and moist. Non diaphoretic.       LABS:                        10.5   6.4   )-----------( 173      ( 31 Jul 2018 06:32 )             31.9     07-31    143  |  104  |  49<H>  ----------------------------<  131<H>  3.9   |  33<H>  |  2.34<H>    Ca    9.8      31 Jul 2018 06:32  Phos  3.5     07-30  Mg     2.3     07-30    TPro  6.6  /  Alb  3.5  /  TBili  2.5<H>  /  DBili  x   /  AST  23  /  ALT  17  /  AlkPhos  65  07-30    PT/INR - ( 29 Jul 2018 18:51 )   PT: 10.9 sec;   INR: 1.00 ratio         PTT - ( 29 Jul 2018 18:51 )  PTT:30.0 sec      CARDIAC MARKERS ( 30 Jul 2018 11:03 )  0.024 ng/mL / x     / x     / x     / x      CARDIAC MARKERS ( 30 Jul 2018 04:33 )  <0.015 ng/mL / x     / 25 U/L / x     / 1.2 ng/mL  CARDIAC MARKERS ( 29 Jul 2018 18:51 )  0.022 ng/mL / x     / 12 U/L / x     / 1.1 ng/mL        Serum Pro-Brain Natriuretic Peptide: 3084 pg/mL (07-29-18 @ 18:51)    RADIOLOGY & ADDITIONAL STUDIES:  EKG:   CXR: < from: Xray Chest 1 View AP/PA (07.29.18 @ 18:14) >  ROCEDURE DATE:  07/29/2018          INTERPRETATION:  CLINICAL STATEMENT: Chest Pain.    TECHNIQUE: AP view of the chest.    COMPARISON: 12/13/2016    FINDINGS/  IMPRESSION:  Study limited due to rotation.    Right cardiac device. New since prior exam.    Small left pleural effusion with atelectasis left lung base.    Heart size cannot be accurately assessed in this projection.      PAST MEDICAL & SURGICAL HISTORY:  Stented coronary artery  Pacemaker  CKD (chronic kidney disease)  Hypertension  AICD (automatic cardioverter/defibrillator) present    CAD (coronary artery disease)  Kidney carcinoma: s/p right nephrectomy  Prostate cancer  Kidney carcinoma: s/p right nephrectomy  Hernia: Hernioplasty  Prostate: prostatectomy     Impression; 84 Y/O Male with prior mentioned multiple chronic conditions. Presented with SOB on exertion and talking x 1 week , patient unable to walk from room to kitchen. SOB secondary to Heart failure and CXR with trace of Pleural Effusion. Has rales due to COPD.        Suggestion;  Oxygen supplementation if needed for SOB. O2 saturation 97% room air.  Start DuoNeb Q 6 hours.  Repeat CXR in A.M.  DVT/ GI prophylactic.   OOB to chair.  Agree with above assessment and plan as transcribed.

## 2018-07-31 NOTE — [facility]ED PRACTICE NURSE CONSULT - ASSESSMENT
This is a 85yr old male patient admitted for Dyspnea, presenting with the following:  -There is evidence of dry skin and scabbed wounds without drainage to the Bilateral Lower Legs  -There is a Stage 2 Pressure Injury to the R. Gluteus (1cm x 0.2cm) with pink tissue and very scant drainage

## 2018-07-31 NOTE — PROGRESS NOTE ADULT - SUBJECTIVE AND OBJECTIVE BOX
Patient is a 85y old  Male who presents with a chief complaint of Shortness of breath (30 Jul 2018 04:10)    pt seen in icu [  ], reg med floor [   ], bed [  ], chair at bedside [   ], a+o x3 [  ], lethargic [  ],  nad [  ]    castillo [  ], ngt [  ], peg [  ], et tube [  ], cent line [  ], picc line [  ]        Allergies    aspirin (Anaphylaxis)        Vitals    T(F): 98.2 (07-31-18 @ 11:33), Max: 99.2 (07-30-18 @ 21:32)  HR: 60 (07-31-18 @ 13:20) (58 - 73)  BP: 124/46 (07-31-18 @ 13:20) (124/46 - 183/62)  RR: 18 (07-31-18 @ 11:33) (16 - 18)  SpO2: 99% (07-31-18 @ 11:33) (98% - 99%)  Wt(kg): --  CAPILLARY BLOOD GLUCOSE      POCT Blood Glucose.: 161 mg/dL (29 Jul 2018 17:19)      Labs                          10.5   6.4   )-----------( 173      ( 31 Jul 2018 06:32 )             31.9       07-31    143  |  104  |  49<H>  ----------------------------<  131<H>  3.9   |  33<H>  |  2.34<H>    Ca    9.8      31 Jul 2018 06:32  Phos  3.5     07-30  Mg     2.3     07-30    TPro  6.6  /  Alb  3.5  /  TBili  2.5<H>  /  DBili  x   /  AST  23  /  ALT  17  /  AlkPhos  65  07-30      CARDIAC MARKERS ( 30 Jul 2018 11:03 )  0.024 ng/mL / x     / x     / x     / x      CARDIAC MARKERS ( 30 Jul 2018 04:33 )  <0.015 ng/mL / x     / 25 U/L / x     / 1.2 ng/mL  CARDIAC MARKERS ( 29 Jul 2018 18:51 )  0.022 ng/mL / x     / 12 U/L / x     / 1.1 ng/mL            Radiology Results      Meds    MEDICATIONS  (STANDING):  cloNIDine 0.2 milliGRAM(s) Oral three times a day  clopidogrel Tablet 75 milliGRAM(s) Oral daily  docusate sodium 100 milliGRAM(s) Oral three times a day  heparin  Injectable 5000 Unit(s) SubCutaneous every 8 hours  hydrALAZINE 25 milliGRAM(s) Oral every 8 hours  metoprolol succinate ER 50 milliGRAM(s) Oral daily  neomycin/BACItracin/polymyxin Ointment 1 Application(s) Right EYE every 6 hours  simvastatin 10 milliGRAM(s) Oral at bedtime      MEDICATIONS  (PRN):      Physical Exam    Neuro :  no focal deficits  Respiratory: CTA B/L  CV: RRR, S1S2, no murmurs,   Abdominal: Soft, NT, ND +BS,  Extremities: No edema, + peripheral pulses    ASSESSMENT    Other form of dyspnea  Stented coronary artery  Pacemaker  CKD (chronic kidney disease)  Hypertension  AICD (automatic cardioverter/defibrillator) present  CAD (coronary artery disease)  Kidney carcinoma  Prostate cancer  Kidney carcinoma  Hernia  Prostate      PLAN Patient is a 85y old  Male who presents with a chief complaint of Shortness of breath (30 Jul 2018 04:10)    pt seen in ed tele [ x ], reg med floor [   ], bed [x  ], chair at bedside [   ], a+o x3 [x  ], lethargic [  ],  nad [ x ]      Allergies    aspirin (Anaphylaxis)        Vitals    T(F): 98.2 (07-31-18 @ 11:33), Max: 99.2 (07-30-18 @ 21:32)  HR: 60 (07-31-18 @ 13:20) (58 - 73)  BP: 124/46 (07-31-18 @ 13:20) (124/46 - 183/62)  RR: 18 (07-31-18 @ 11:33) (16 - 18)  SpO2: 99% (07-31-18 @ 11:33) (98% - 99%)  Wt(kg): --  CAPILLARY BLOOD GLUCOSE      POCT Blood Glucose.: 161 mg/dL (29 Jul 2018 17:19)      Labs                          10.5   6.4   )-----------( 173      ( 31 Jul 2018 06:32 )             31.9       07-31    143  |  104  |  49<H>  ----------------------------<  131<H>  3.9   |  33<H>  |  2.34<H>    Ca    9.8      31 Jul 2018 06:32  Phos  3.5     07-30  Mg     2.3     07-30    TPro  6.6  /  Alb  3.5  /  TBili  2.5<H>  /  DBili  x   /  AST  23  /  ALT  17  /  AlkPhos  65  07-30      CARDIAC MARKERS ( 30 Jul 2018 11:03 )  0.024 ng/mL / x     / x     / x     / x      CARDIAC MARKERS ( 30 Jul 2018 04:33 )  <0.015 ng/mL / x     / 25 U/L / x     / 1.2 ng/mL  CARDIAC MARKERS ( 29 Jul 2018 18:51 )  0.022 ng/mL / x     / 12 U/L / x     / 1.1 ng/mL            Radiology Results      Meds    MEDICATIONS  (STANDING):  cloNIDine 0.2 milliGRAM(s) Oral three times a day  clopidogrel Tablet 75 milliGRAM(s) Oral daily  docusate sodium 100 milliGRAM(s) Oral three times a day  heparin  Injectable 5000 Unit(s) SubCutaneous every 8 hours  hydrALAZINE 25 milliGRAM(s) Oral every 8 hours  metoprolol succinate ER 50 milliGRAM(s) Oral daily  neomycin/BACItracin/polymyxin Ointment 1 Application(s) Right EYE every 6 hours  simvastatin 10 milliGRAM(s) Oral at bedtime      MEDICATIONS  (PRN):      Physical Exam    Neuro :  no focal deficits  Respiratory: CTA B/L  CV: RRR, S1S2, no murmurs,   Abdominal: Soft, NT, ND +BS,  Extremities: No edema, + peripheral pulses    ASSESSMENT      chf exacerb,   left pleural eff,   acute on ckd,   right eye conjunctivitis,   h/o CAD, + Stent placed in 2009 on Plavix QOD,  AICD placed in 2011,   Prostate CA S/P surgery and RTX,   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

## 2018-07-31 NOTE — ADVANCED PRACTICE NURSE CONSULT - RECOMMEDATIONS
-Clean all affected areas with normal saline and apply skin prep to the surrounding skin  -Moisturize the Bilateral Lower Legs Daily PRN  -Apply TRIAD Moisture Barrier Cream to the Bilateral Gluteal areas b.i.d. PRN  -Elevate/float the patients heels using heel protectors and reposition the patient Q 2hrs using wedges or pillows

## 2018-08-01 ENCOUNTER — TRANSCRIPTION ENCOUNTER (OUTPATIENT)
Age: 83
End: 2018-08-01

## 2018-08-01 DIAGNOSIS — J38.3 OTHER DISEASES OF VOCAL CORDS: ICD-10-CM

## 2018-08-01 DIAGNOSIS — I50.9 HEART FAILURE, UNSPECIFIED: ICD-10-CM

## 2018-08-01 LAB
ANION GAP SERPL CALC-SCNC: 7 MMOL/L — SIGNIFICANT CHANGE UP (ref 5–17)
BASE EXCESS BLDA CALC-SCNC: 2.7 MMOL/L — HIGH (ref -2–2)
BLOOD GAS COMMENTS ARTERIAL: SIGNIFICANT CHANGE UP
BUN SERPL-MCNC: 48 MG/DL — HIGH (ref 7–18)
CALCIUM SERPL-MCNC: 9.7 MG/DL — SIGNIFICANT CHANGE UP (ref 8.4–10.5)
CHLORIDE SERPL-SCNC: 100 MMOL/L — SIGNIFICANT CHANGE UP (ref 96–108)
CO2 SERPL-SCNC: 33 MMOL/L — HIGH (ref 22–31)
CREAT SERPL-MCNC: 2.49 MG/DL — HIGH (ref 0.5–1.3)
GLUCOSE BLDC GLUCOMTR-MCNC: 220 MG/DL — HIGH (ref 70–99)
GLUCOSE SERPL-MCNC: 192 MG/DL — HIGH (ref 70–99)
HCO3 BLDA-SCNC: 26 MMOL/L — SIGNIFICANT CHANGE UP (ref 23–27)
HCT VFR BLD CALC: 32 % — LOW (ref 39–50)
HGB BLD-MCNC: 10.5 G/DL — LOW (ref 13–17)
HOROWITZ INDEX BLDA+IHG-RTO: 100 — SIGNIFICANT CHANGE UP
MCHC RBC-ENTMCNC: 29.7 PG — SIGNIFICANT CHANGE UP (ref 27–34)
MCHC RBC-ENTMCNC: 32.7 GM/DL — SIGNIFICANT CHANGE UP (ref 32–36)
MCV RBC AUTO: 90.8 FL — SIGNIFICANT CHANGE UP (ref 80–100)
PCO2 BLDA: 34 MMHG — SIGNIFICANT CHANGE UP (ref 32–46)
PH BLDA: 7.49 — HIGH (ref 7.35–7.45)
PLATELET # BLD AUTO: 224 K/UL — SIGNIFICANT CHANGE UP (ref 150–400)
PO2 BLDA: 517 MMHG — HIGH (ref 74–108)
POTASSIUM SERPL-MCNC: 3.9 MMOL/L — SIGNIFICANT CHANGE UP (ref 3.5–5.3)
POTASSIUM SERPL-SCNC: 3.9 MMOL/L — SIGNIFICANT CHANGE UP (ref 3.5–5.3)
RBC # BLD: 3.53 M/UL — LOW (ref 4.2–5.8)
RBC # FLD: 13.5 % — SIGNIFICANT CHANGE UP (ref 10.3–14.5)
SAO2 % BLDA: >99.1 % — SIGNIFICANT CHANGE UP (ref 92–96)
SODIUM SERPL-SCNC: 140 MMOL/L — SIGNIFICANT CHANGE UP (ref 135–145)
WBC # BLD: 8.7 K/UL — SIGNIFICANT CHANGE UP (ref 3.8–10.5)
WBC # FLD AUTO: 8.7 K/UL — SIGNIFICANT CHANGE UP (ref 3.8–10.5)

## 2018-08-01 PROCEDURE — 71045 X-RAY EXAM CHEST 1 VIEW: CPT | Mod: 26

## 2018-08-01 RX ORDER — PROPOFOL 10 MG/ML
12.63 INJECTION, EMULSION INTRAVENOUS
Qty: 1000 | Refills: 0 | Status: DISCONTINUED | OUTPATIENT
Start: 2018-08-01 | End: 2018-08-02

## 2018-08-01 RX ORDER — INSULIN LISPRO 100/ML
VIAL (ML) SUBCUTANEOUS EVERY 6 HOURS
Qty: 0 | Refills: 0 | Status: DISCONTINUED | OUTPATIENT
Start: 2018-08-01 | End: 2018-08-04

## 2018-08-01 RX ORDER — PANTOPRAZOLE SODIUM 20 MG/1
40 TABLET, DELAYED RELEASE ORAL
Qty: 0 | Refills: 0 | Status: DISCONTINUED | OUTPATIENT
Start: 2018-08-01 | End: 2018-08-01

## 2018-08-01 RX ORDER — PANTOPRAZOLE SODIUM 20 MG/1
40 TABLET, DELAYED RELEASE ORAL ONCE
Qty: 0 | Refills: 0 | Status: COMPLETED | OUTPATIENT
Start: 2018-08-01 | End: 2018-08-01

## 2018-08-01 RX ORDER — DEXAMETHASONE 0.5 MG/5ML
4 ELIXIR ORAL EVERY 6 HOURS
Qty: 0 | Refills: 0 | Status: DISCONTINUED | OUTPATIENT
Start: 2018-08-01 | End: 2018-08-02

## 2018-08-01 RX ORDER — INSULIN LISPRO 100/ML
VIAL (ML) SUBCUTANEOUS
Qty: 0 | Refills: 0 | Status: DISCONTINUED | OUTPATIENT
Start: 2018-08-01 | End: 2018-08-01

## 2018-08-01 RX ORDER — DEXAMETHASONE 0.5 MG/5ML
10 ELIXIR ORAL ONCE
Qty: 0 | Refills: 0 | Status: COMPLETED | OUTPATIENT
Start: 2018-08-01 | End: 2018-08-01

## 2018-08-01 RX ADMIN — PANTOPRAZOLE SODIUM 40 MILLIGRAM(S): 20 TABLET, DELAYED RELEASE ORAL at 19:02

## 2018-08-01 RX ADMIN — Medication 4 MILLIGRAM(S): at 23:47

## 2018-08-01 RX ADMIN — Medication 100 MILLIGRAM(S): at 05:52

## 2018-08-01 RX ADMIN — Medication 1 APPLICATION(S): at 19:00

## 2018-08-01 RX ADMIN — Medication 100 MILLIGRAM(S): at 13:11

## 2018-08-01 RX ADMIN — Medication 2: at 23:47

## 2018-08-01 RX ADMIN — Medication 60 MILLIGRAM(S): at 05:52

## 2018-08-01 RX ADMIN — Medication 102 MILLIGRAM(S): at 19:01

## 2018-08-01 RX ADMIN — HEPARIN SODIUM 5000 UNIT(S): 5000 INJECTION INTRAVENOUS; SUBCUTANEOUS at 13:12

## 2018-08-01 RX ADMIN — HEPARIN SODIUM 5000 UNIT(S): 5000 INJECTION INTRAVENOUS; SUBCUTANEOUS at 05:52

## 2018-08-01 RX ADMIN — Medication 0.2 MILLIGRAM(S): at 13:11

## 2018-08-01 RX ADMIN — CLOPIDOGREL BISULFATE 75 MILLIGRAM(S): 75 TABLET, FILM COATED ORAL at 12:04

## 2018-08-01 RX ADMIN — PROPOFOL 5 MICROGRAM(S)/KG/MIN: 10 INJECTION, EMULSION INTRAVENOUS at 18:47

## 2018-08-01 RX ADMIN — Medication 1 APPLICATION(S): at 12:04

## 2018-08-01 RX ADMIN — Medication 25 MILLIGRAM(S): at 13:11

## 2018-08-01 RX ADMIN — Medication 50 MILLIGRAM(S): at 05:52

## 2018-08-01 RX ADMIN — Medication 25 MILLIGRAM(S): at 05:52

## 2018-08-01 RX ADMIN — HEPARIN SODIUM 5000 UNIT(S): 5000 INJECTION INTRAVENOUS; SUBCUTANEOUS at 21:41

## 2018-08-01 RX ADMIN — Medication 0.2 MILLIGRAM(S): at 05:52

## 2018-08-01 RX ADMIN — Medication 1 APPLICATION(S): at 23:46

## 2018-08-01 RX ADMIN — Medication 1 APPLICATION(S): at 05:52

## 2018-08-01 NOTE — CHART NOTE - NSCHARTNOTEFT_GEN_A_CORE
Patient s/p bronchoscopy, tolerated well, no foreign body visualized, noted with significant vocal cord swelling.  Discussed with patient Souleymane Leblanc [phone], questions answered.

## 2018-08-01 NOTE — DISCHARGE NOTE ADULT - MEDICATION SUMMARY - MEDICATIONS TO TAKE
I will START or STAY ON the medications listed below when I get home from the hospital:    acetaminophen 325 mg oral tablet  -- 2 tab(s) by mouth every 6 hours, As needed, headache  -- Indication: For Pain    cloNIDine 0.3 mg oral tablet  -- 1 tab(s) by mouth 3 times a day  -- Indication: For Hypertension    simvastatin 10 mg oral tablet  -- 1 tab(s) by mouth once a day (at bedtime)  -- Indication: For CAD (coronary artery disease)    clopidogrel 75 mg oral tablet  -- 1 tab(s) by mouth every other day  -- Indication: For CAD (coronary artery disease)    metoprolol succinate 50 mg oral tablet, extended release  -- 1 tab(s) by mouth once a day x 30 days  -- Indication: For Hypertension    albuterol 90 mcg/inh inhalation aerosol with adapter  --  inhaled 4 times a day, As Needed  -- Indication: For CoPD    ProAir HFA 90 mcg/inh inhalation aerosol  -- 2 puff(s) inhaled 4 times a day, As Needed  -- Indication: For CoPD    furosemide 40 mg oral tablet  -- 1 tab(s) by mouth once a day  -- Indication: For CHF (congestive heart failure)    montelukast 10 mg oral tablet  -- 1 tab(s) by mouth once a day  -- Indication: For Asthma    hydrALAZINE 25 mg oral tablet  -- 1 tab(s) by mouth every 8 hours  -- Indication: For Hypertension    Centrum Silver Ultra Men's  --     -- Indication: For Supplement I will START or STAY ON the medications listed below when I get home from the hospital:    acetaminophen 325 mg oral tablet  -- 2 tab(s) by mouth every 6 hours, As needed, headache  -- Indication: For Pain    cloNIDine 0.3 mg oral tablet  -- 1 tab(s) by mouth 3 times a day  -- Indication: For Hypertension    simvastatin 10 mg oral tablet  -- 1 tab(s) by mouth once a day (at bedtime)  -- Indication: For Hyperlipidemia    clopidogrel 75 mg oral tablet  -- 1 tab(s) by mouth every other day  -- Indication: For CAD (coronary artery disease)    metoprolol succinate 50 mg oral tablet, extended release  -- 1 tab(s) by mouth once a day x 30 days  -- Indication: For Hypertension    albuterol 90 mcg/inh inhalation aerosol with adapter  --  inhaled 4 times a day, As Needed  -- Indication: For Asthma    ProAir HFA 90 mcg/inh inhalation aerosol  -- 2 puff(s) inhaled 4 times a day, As Needed  -- Indication: For Asthma    furosemide 40 mg oral tablet  -- 1 tab(s) by mouth once a day  -- Indication: For CHF (congestive heart failure)    montelukast 10 mg oral tablet  -- 1 tab(s) by mouth once a day  -- Indication: For Asthma    hydrALAZINE 25 mg oral tablet  -- 1 tab(s) by mouth every 8 hours  -- Indication: For Hypertension    Centrum Silver Ultra Men's  --     -- Indication: For Supplement

## 2018-08-01 NOTE — DISCHARGE NOTE ADULT - PLAN OF CARE
Symptom relief You complained of a cough. You may continue taking robitussin outpatient. Diuresis You were admitted with CHF exacerbation and treated with IV lasix. Your improved significantly and are now euvolemic. Continue your medications outpatient as prescribed and see your cardiologist in 1 week. Treatment You were suspected to have conjunctivitis of the right eye and treated with an antibiotic ointment in patient. Follow up with your primary doctor outpatient. BP Control You have a history of hypertension. Follow up with your primary doctor in 1 week and take all medications as prescribed. You were admitted with CHF exacerbation and treated with IV lasix. Your improved significantly and are now euvolemic. Continue your lasix 40mg daily as you were outpatient and see your cardiologist in 1 week. Resolution You were suspected to have conjunctivitis of the right eye and treated with an antibiotic ointment in patient with improvement and resolution. Follow up with your primary doctor outpatient. You have a history of hypertension. We have stopped your medications Imdur and Norsvasc. Follow up with your primary doctor in 1 week and take all medications as outlined in your discharge medications. You complained of a cough. You may continue taking Robitussin outpatient if you need. Prevention of complications You have a history of coronary artery disease. You will continue taking Plavix daily as you were. Follow up with cardiologist in 1 week for outpatient stress test.

## 2018-08-01 NOTE — PROGRESS NOTE ADULT - SUBJECTIVE AND OBJECTIVE BOX
pt seen and examined.pts current chart reviewed and case discussed with resident covering.    SUBJECTIVE:  pt feels fine and denies cp,sob,gi or gu/uremic  symptons    REVIEW OF SYSTEMS:  CONSTITUTIONAL: No weakness, fevers or chills  EYES/ENT: No visual changes;  No vertigo or throat pain   NECK: No pain or stiffness  RESPIRATORY: No cough, wheezing, hemoptysis; No shortness of breath  CARDIOVASCULAR: No chest pain or palpitations  GASTROINTESTINAL: No abdominal or epigastric pain. No nausea, vomiting, or hematemesis; No diarrhea or constipation. No melena or hematochezia.  GENITOURINARY: No dysuria, frequency , hematuria, flank pain or nocturia  NEUROLOGICAL: No numbness or weakness  SKIN: No itching, burning, rashes, or lesions   All other review of systems is negative unless indicated above    Current meds:    cloNIDine 0.2 milliGRAM(s) Oral three times a day  clopidogrel Tablet 75 milliGRAM(s) Oral daily  docusate sodium 100 milliGRAM(s) Oral three times a day  furosemide   Injectable 60 milliGRAM(s) IV Push daily  heparin  Injectable 5000 Unit(s) SubCutaneous every 8 hours  hydrALAZINE 25 milliGRAM(s) Oral every 8 hours  metoprolol succinate ER 50 milliGRAM(s) Oral daily  neomycin/BACItracin/polymyxin Ointment 1 Application(s) Right EYE every 6 hours  simvastatin 10 milliGRAM(s) Oral at bedtime      Vital Signs    T(F): 98.2 (18 @ 11:54), Max: 98.6 (18 @ 17:05)  HR: 59 (18 @ 11:54) (59 - 80)  BP: 132/62 (18 @ 11:54) (116/42 - 167/53)  ABP: --  RR: 18 (18 @ 11:54) (17 - 20)  SpO2: 96% (18 @ 11:54) (96% - 100%)  Wt(kg): --  CVP(cm H2O): --  CO: --  PCWP: --    I and O's:     @ 07:01  -   @ 07:00  --------------------------------------------------------  IN:    Oral Fluid: 470 mL  Total IN: 470 mL    OUT:    Voided: 450 mL  Total OUT: 450 mL    Total NET: 20 mL       @ 07:  -   @ 14:20  --------------------------------------------------------  IN:    Oral Fluid: 300 mL  Total IN: 300 mL    OUT:  Total OUT: 0 mL    Total NET: 300 mL        Daily     Daily Weight in k.8 (01 Aug 2018 11:22)    PHYSICAL EXAM:  Constitutional: well developed, well nourished  and in nad  HEENT: PERRLA,  no icteric sclera and mild pallor of conjunctiva noted  Neck: No JVD, thyromegaly or adenopathy  Respiratory: reduced air entry lower lungs with no rales, wheezing or rhonchi  Cardiovascular: S1 and S2 normally heard  Gastrointestinal: soft, nondistended, nontender and normal bowel sounds heard  Extremities: No peripheral edema or cyanosis  Neurological: A/O x 3, no focal deficits  : No flank or cva tenderness palpated.  Skin: No rashes      LABS:    CBC:                          10.5   8.7   )-----------( 224      ( 01 Aug 2018 09:37 )             32.0           BMP:        140  |  100  |  48<H>  ----------------------------<  192<H>  3.9   |  33<H>  |  2.49<H>  07    143  |  104  |  49<H>  ----------------------------<  131<H>  3.9   |  33<H>  |  2.34<H>  0730    143  |  105  |  51<H>  ----------------------------<  136<H>  3.6   |  32<H>  |  2.26<H>      143  |  105  |  52<H>  ----------------------------<  139<H>  3.6   |  31  |  2.48<H>    Ca    9.7      01 Aug 2018 09:37  Ca    9.8      2018 06:32  Ca    9.7      2018 04:34  Ca    9.6      2018 18:51  Phos  3.5       Mg     2.3         TPro  6.6  /  Alb  3.5  /  TBili  2.5<H>  /  DBili  x   /  AST  23  /  ALT  17  /  AlkPhos  65    TPro  6.7  /  Alb  3.4<L>  /  TBili  2.4<H>  /  DBili  x   /  AST  16  /  ALT  15  /  AlkPhos  62            URINE STUDIES:        Creatinine, Random Urine: 29 mg/dL ( @ 16:33)  Sodium, Random Urine: 107 mmol/L ( @ 16:33)  Potassium, Random Urine: 17 mmol/L ( @ 16:33)                  RADIOLOGY & ADDITIONAL STUDIES: pt seen and examined.    SUBJECTIVE:  pt is c/o sob after swallowing a pill this pm  He denies cp,cough or dizziness  pt could not get ct scan as pt wa snot able to lay down supine  pt will go to Emergency Bronchoscopy for possible aspiration    Current meds:    cloNIDine 0.2 milliGRAM(s) Oral three times a day  clopidogrel Tablet 75 milliGRAM(s) Oral daily  docusate sodium 100 milliGRAM(s) Oral three times a day  furosemide   Injectable 60 milliGRAM(s) IV Push daily  heparin  Injectable 5000 Unit(s) SubCutaneous every 8 hours  hydrALAZINE 25 milliGRAM(s) Oral every 8 hours  metoprolol succinate ER 50 milliGRAM(s) Oral daily  neomycin/BACItracin/polymyxin Ointment 1 Application(s) Right EYE every 6 hours  simvastatin 10 milliGRAM(s) Oral at bedtime      Vital Signs    T(F): 98.2 (18 @ 11:54), Max: 98.6 (18 @ 17:05)  HR: 59 (18 @ 11:54) (59 - 80)  BP: 132/62 (18 @ 11:54) (116/42 - 167/53)  ABP: --  RR: 18 (18 @ 11:54) (17 - 20)  SpO2: 96% (18 @ 11:54) (96% - 100%)  Wt(kg): --  CVP(cm H2O): --  CO: --  PCWP: --    I and O's:     @ :  -   @ 07:00  --------------------------------------------------------  IN:    Oral Fluid: 470 mL  Total IN: 470 mL    OUT:    Voided: 450 mL  Total OUT: 450 mL    Total NET: 20 mL       @ 07:01  -   @ 14:20  --------------------------------------------------------  IN:    Oral Fluid: 300 mL  Total IN: 300 mL    OUT:  Total OUT: 0 mL    Total NET: 300 mL        Daily     Daily Weight in k.8 (01 Aug 2018 11:22)    PHYSICAL EXAM:  Constitutional: well developed, well nourished  and in nad  HEENT: PERRLA,  no icteric sclera and mild pallor of conjunctiva noted  Neck: No JVD, thyromegaly or adenopathy  Respiratory: reduced air entry lower lungs with few  wheezing heard  Cardiovascular: S1 and S2 normally heard  Gastrointestinal: soft, nondistended, nontender and normal bowel sounds heard  Extremities: mild peripheral edema noted in both LE s , no  cyanosis  Neurological: A/O x 3, no focal deficits  Psychiatric: Normal mood, normal affect  : No flank tenderness  Skin: No rashes    LABS:    CBC:                          10.5   8.7   )-----------( 224      ( 01 Aug 2018 09:37 )             32.0           BMP:        140  |  100  |  48<H>  ----------------------------<  192<H>  3.9   |  33<H>  |  2.49<H>  07-31    143  |  104  |  49<H>  ----------------------------<  131<H>  3.9   |  33<H>  |  2.34<H>  0730    143  |  105  |  51<H>  ----------------------------<  136<H>  3.6   |  32<H>  |  2.26<H>  07-29    143  |  105  |  52<H>  ----------------------------<  139<H>  3.6   |  31  |  2.48<H>    Ca    9.7      01 Aug 2018 09:37  Ca    9.8      2018 06:32  Ca    9.7      2018 04:34  Ca    9.6      2018 18:51  Phos  3.5     07-30  Mg     2.3     07-30    TPro  6.6  /  Alb  3.5  /  TBili  2.5<H>  /  DBili  x   /  AST  23  /  ALT  17  /  AlkPhos  65    TPro  6.7  /  Alb  3.4<L>  /  TBili  2.4<H>  /  DBili  x   /  AST  16  /  ALT  15  /  AlkPhos  62            URINE STUDIES:        Creatinine, Random Urine: 29 mg/dL ( @ 16:33)  Sodium, Random Urine: 107 mmol/L ( @ 16:33)  Potassium, Random Urine: 17 mmol/L ( @ 16:33)                  RADIOLOGY & ADDITIONAL STUDIES:    < from: Xray Chest 1 View- PORTABLE-Urgent (18 @ 11:12) >  XAM:  XR CHEST PORTABLE URGENT 1V                            PROCEDURE DATE:  2018          INTERPRETATION:  AP erect chest on 2018 at 10:56 AM. Patient is   short of breath.    Somewhat elevated diaphragms crowd the chest. Colonicinterposition under   the right hemidiaphragm again noted.    Heart is magnified by technique. Right-sided pacer device again seen.    Slight linear scar or atelectasis off left heart border again noted.    Minimal blunting of the left base laterallyagain seen.    Chest is similar to .    IMPRESSION: Unchanged chest as above.    < end of copied text >

## 2018-08-01 NOTE — CONSULT NOTE ADULT - PROBLEM SELECTOR RECOMMENDATION 3
Continue hydralazine and clonidine Continue Lasix 60 iv daily, Clonidine 0.2mg TID, hydralazine 25 mg TID and metoprolol 25 daily  Goal Bp <140/90

## 2018-08-01 NOTE — DISCHARGE NOTE ADULT - PATIENT PORTAL LINK FT
You can access the [facility], offered by [facility], by registering with the following website: [email or web address]

## 2018-08-01 NOTE — DISCHARGE NOTE ADULT - MEDICATION SUMMARY - MEDICATIONS TO STOP TAKING
I will STOP taking the medications listed below when I get home from the hospital:    isosorbide dinitrate 20 mg oral tablet  -- 1 tab(s) by mouth 3 times a day    amLODIPine 2.5 mg oral tablet  -- 1 tab(s) by mouth once a day

## 2018-08-01 NOTE — DISCHARGE NOTE ADULT - PROVIDER TOKENS
FREE:[LAST:[.],PHONE:[(   )    -],FAX:[(   )    -],ADDRESS:[Follow up with cardiologist in 1 week.]]

## 2018-08-01 NOTE — CONSULT NOTE ADULT - SUBJECTIVE AND OBJECTIVE BOX
PULMONARY/CRITICAL CARE CONSULTATION  AN VINES 85y MalePatient is a 85y old  Male who presents with a chief complaint of Shortness of breath (01 Aug 2018 11:22)      HPI on admission: 80 y/o male from home, ambulates independently with PMHX of CAD, + Stent placed in 2009 on Plavix QOD, AICD placed in 2011, Prostate CA, S/P surgery and RTX, Rt Nephrectomy for Rt Renal cell CA, Htn, HLD  Asthma stable, CKD ( baseline creatinine is 2.0) , Bradycardia (s/p [employer] AICD), asthma, CKD, and COPD (not on home O2) S/P Colonoscopy in March 2015 c/w Diverticulosis , HX of Polyps removed, PVD ( s/p balloon left leg stent) came with complain of dyspnea and blurry vision since 1 week. Patient states he is developing worsening shortness of breath since 1 week to an extent that he is unable to walk from room to kitchen, on exertion and on talking. He also developed blurry vision in Rt eye since 1 week with purulent discharge which progressively got worse since 1 week. He denies chest pain, fever, chills, nausea, vomiting, diarrhea, constipation or any other complains.       ICU was consulted s/p bronchoscopy. Patient was being treated for CHF and on morning of 8/1/18, patient started to have slurring of speech. Patient was taken for urgent bronchoscopy for risk for foreign body aspiration. Bronchoscopy was done and no foreign body was found but vocal cord swelling was present. Patient was intubated and ICU was consulted.     PAST MEDICAL & SURGICAL HISTORY:  Stented coronary artery  Pacemaker  CKD (chronic kidney disease)  Hypertension  AICD (automatic cardioverter/defibrillator) present  CAD (coronary artery disease)  Kidney carcinoma: s/p right nephrectomy  Prostate cancer  Kidney carcinoma: s/p right nephrectomy  Hernia: Hernioplasty  Prostate: prostatectomy    Allergies    aspirin (Anaphylaxis)    Intolerances      SOCIAL HISTORY/FAMILY HISTORY reviewed:   Medications:  cloNIDine 0.2 milliGRAM(s) Oral three times a day  clopidogrel Tablet 75 milliGRAM(s) Oral daily  docusate sodium 100 milliGRAM(s) Oral three times a day  furosemide   Injectable 60 milliGRAM(s) IV Push daily  heparin  Injectable 5000 Unit(s) SubCutaneous every 8 hours  hydrALAZINE 25 milliGRAM(s) Oral every 8 hours  metoprolol succinate ER 50 milliGRAM(s) Oral daily  neomycin/BACItracin/polymyxin Ointment 1 Application(s) Right EYE every 6 hours  simvastatin 10 milliGRAM(s) Oral at bedtime      REVIEW OF SYSTEMS:    CONSTITUTIONAL: No weakness, fevers or chills  NECK: No pain or stiffness  RESPIRATORY: No cough, wheezing, hemoptysis; No shortness of breath  CARDIOVASCULAR: No chest pain or palpitations  GASTROINTESTINAL: No abdominal or epigastric pain. No nausea, vomiting, No diarrhea or constipation. No melena or hematochezia.  GENITOURINARY: No dysuria, frequency or hematuria  NEUROLOGICAL: No numbness or weakness  All other review of systems is negative unless indicated above        vent settings if applicable:      T(F): 99.3 (08-01-18 @ 15:07), Max: 99.3 (08-01-18 @ 15:07)  HR: 67 (08-01-18 @ 15:07)  BP: 149/48 (08-01-18 @ 15:07)  BP(mean): --  ABP: --  RR: 18 (08-01-18 @ 15:07)  SpO2: 100% (08-01-18 @ 15:07)  Wt(kg): --                LABS:                        10.5   8.7   )-----------( 224      ( 01 Aug 2018 09:37 )             32.0     08-01    140  |  100  |  48<H>  ----------------------------<  192<H>  3.9   |  33<H>  |  2.49<H>    Ca    9.7      01 Aug 2018 09:37            CAPILLARY BLOOD GLUCOSE            CULTURES:        PHYSICAL EXAM:    GENERAL: NAD, well-groomed, well-developed  EYES: EOMI, PERRLA,   NECK: Supple, No JVD; Normal thyroid; Trachea midline  NERVOUS SYSTEM:  Alert & Oriented X3,  Motor Strength 5/5 B/L upper and lower extremities; DTRs 2+ intact and symmetric  CHEST/LUNG: No rales, rhonchi, wheezing   HEART: Regular rate and rhythm; No murmurs,   ABDOMEN: Soft, Nontender, Nondistended; Bowel sounds present  EXTREMITIES:  2+ Peripheral Pulses, No clubbing, cyanosis, or edema    RADIOLOGY REVIEWED: PULMONARY/CRITICAL CARE CONSULTATION  AN VINES 85y MalePatient is a 85y old  Male who presents with a chief complaint of Shortness of breath (01 Aug 2018 11:22)      HPI on admission: 82 y/o male from home, ambulates independently with PMHX of CAD, + Stent placed in 2009 on Plavix QOD, AICD placed in 2011 (s/p removal), CHF s/p PPMProstate CA, S/P surgery and RTX, Rt Nephrectomy for Rt Renal cell CA, Htn, HLD  Asthma stable, CKD ( baseline creatinine is 2.0) ,, asthma, CKD, and COPD (not on home O2) S/P Colonoscopy in March 2015 c/w Diverticulosis , HX of Polyps removed, PVD ( s/p balloon left leg stent) came with complain of dyspnea and blurry vision since 1 week. Patient states he is developing worsening shortness of breath since 1 week to an extent that he is unable to walk from room to kitchen, on exertion and on talking. He also developed blurry vision in Rt eye since 1 week with purulent discharge which progressively got worse since 1 week. He denies chest pain, fever, chills, nausea, vomiting, diarrhea, constipation or any other complains.       ICU was consulted s/p bronchoscopy. Patient was being treated for CHF and on morning of 8/1/18, patient started to have slurring of speech. Patient was taken for urgent bronchoscopy for risk for foreign body aspiration. Bronchoscopy was done and no foreign body was found but vocal cord swelling was present. Patient was intubated and ICU was consulted.     PAST MEDICAL & SURGICAL HISTORY:  Stented coronary artery  Pacemaker  CKD (chronic kidney disease)  Hypertension  AICD (automatic cardioverter/defibrillator) present  CAD (coronary artery disease)  Kidney carcinoma: s/p right nephrectomy  Prostate cancer  Kidney carcinoma: s/p right nephrectomy  Hernia: Hernioplasty  Prostate: prostatectomy    Allergies    aspirin (Anaphylaxis)    Intolerances      SOCIAL HISTORY/FAMILY HISTORY reviewed:   Medications:  cloNIDine 0.2 milliGRAM(s) Oral three times a day  clopidogrel Tablet 75 milliGRAM(s) Oral daily  docusate sodium 100 milliGRAM(s) Oral three times a day  furosemide   Injectable 60 milliGRAM(s) IV Push daily  heparin  Injectable 5000 Unit(s) SubCutaneous every 8 hours  hydrALAZINE 25 milliGRAM(s) Oral every 8 hours  metoprolol succinate ER 50 milliGRAM(s) Oral daily  neomycin/BACItracin/polymyxin Ointment 1 Application(s) Right EYE every 6 hours  simvastatin 10 milliGRAM(s) Oral at bedtime      REVIEW OF SYSTEMS:  Unable to assess, patient intubated.    vent settings if applicable:      T(F): 99.3 (08-01-18 @ 15:07), Max: 99.3 (08-01-18 @ 15:07)  HR: 67 (08-01-18 @ 15:07)  BP: 149/48 (08-01-18 @ 15:07)  BP(mean): --  ABP: --  RR: 18 (08-01-18 @ 15:07)  SpO2: 100% (08-01-18 @ 15:07)  Wt(kg): --                LABS:                        10.5   8.7   )-----------( 224      ( 01 Aug 2018 09:37 )             32.0     08-01    140  |  100  |  48<H>  ----------------------------<  192<H>  3.9   |  33<H>  |  2.49<H>    Ca    9.7      01 Aug 2018 09:37            CAPILLARY BLOOD GLUCOSE            CULTURES:        PHYSICAL EXAM:    GENERAL: NAD, well-groomed, well-developed  EYES: PERRL   NECK: Supple, No JVD; Normal thyroid; Trachea midline  NERVOUS SYSTEM:  intubated and sedated  CHEST/LUNG: B/l rales, No rhonchi, wheezing   HEART: Regular rate and rhythm; No murmurs,   ABDOMEN: Soft, Nontender, Nondistended; Bowel sounds present  EXTREMITIES:  2+ Peripheral Pulses, No clubbing, cyanosis, or edema    RADIOLOGY REVIEWED:

## 2018-08-01 NOTE — PROGRESS NOTE ADULT - ASSESSMENT
Mr. Leblanc is an 85 year old male with PMH of CHF, CKD, CAD s/p stents, pacemaker, and HTN presentign with chief complaint of dyspnea on exertion and right blurry vision. Patient admitted for CHF exacerbation with suspected infection of right eye. Patient says he aspirated one of his medications today. He was unable to tolerate lying supine for CT. Taken to OR for urgent bronchoscopy. Echo and stress pending for tomorrow.

## 2018-08-01 NOTE — CHART NOTE - NSCHARTNOTEFT_GEN_A_CORE
Patient states one of his pills went into his lungs, he has been coughing profusely. Voice progressively diminishing throughout the day. He was unable to tolerate CT neck and Chest. He became unstable therefore decision was made to perform emergency bronchoscopy. Consent was obtained at bedside, risks vs benefits discussed in detail. Son, Dr. Christensen, Dr. Domingo at bedside. Patient states one of his pills went into his lungs, he has been coughing profusely. Voice progressively diminishing throughout the day. He was unable to tolerate CT neck and Chest. He became unstable therefore decision was made to perform emergency bronchoscopy. Consent was obtained at bedside, risks vs benefits discussed in detail. Son, Dr. Christensen, Dr. Domingo at bedside.  Agree with above assessment and plan as transcribed.

## 2018-08-01 NOTE — CONSULT NOTE ADULT - ATTENDING COMMENTS
pat with prior mentioned medical condition stated he swallowed a tablet in his airway.  As the day progress, pat became more dyspneic, unable to lay flat and hoarse voice. Pat couldn't lay flat for CT chest. Decision was made for emergent bronchoscopy to remove foreign aspirated material.   He was intubated for bronchoscopy, no foreign material in airway, Area above vocal cords were inspected  also and no foreign material.  Vocal cords were edematous. Pt was kept intubated for airway protection and impending resp failure .    -transferreed to icu intubated  -iv decaron  -bronchodilators  -ABG and cxr  -hemodynamic monitoring  -son is aware of post op care and finding of bronch

## 2018-08-01 NOTE — CONSULT NOTE ADULT - PROBLEM SELECTOR RECOMMENDATION 9
Patient found to have vocal cord swelling. Reason could be that he swallowed foreign body and coughed it out causing vocal cord swelling.   Patient was intubated (remain intubated)   Continue mechanical ventilator  Started Decadron 10 mg stat and 4mg q6 thereafter. Patient found to have vocal cord swelling. Reason could be that he swallowed foreign body and coughed it out causing vocal cord swelling.   Patient was intubated (remain intubated)   Continue mechanical ventilator  Started Decadron 10 mg stat and 4mg q6 thereafter.  ENT consulted: Dr Valencia Patient found to have vocal cord swelling. Reason could be that he swallowed foreign body and coughed it out causing vocal cord swelling.   Patient was intubated (remain intubated)   Continue mechanical ventilator  Started Decadron 10 mg stat and 4mg q6 thereafter.  CT could not be done because patient was not able to lay flat. Will order repeat CT scan.  ENT consulted: Dr Valencia Patient found to have vocal cord swelling. Reason could be that he swallowed foreign body and coughed it out causing vocal cord swelling.   Patient was intubated (remain intubated)   Continue mechanical ventilator  Started Decadron 10 mg stat and 4mg q6 thereafter with sliding scale coverage.  CT could not be done because patient was not able to lay flat. Will order repeat CT scan.  ENT consulted: Dr Valencia

## 2018-08-01 NOTE — CONSULT NOTE ADULT - PROBLEM SELECTOR RECOMMENDATION 2
Continue lasix 60 iv, metoprolol and statin  Ef > 55 percent Continue lasix 60 iv, metoprolol and statin  Ef > 55 percent  Cardio Dr Louie Continue Lasix 60 iv, metoprolol and statin  Ef > 55 percent  Cardio Dr Louie

## 2018-08-01 NOTE — DISCHARGE NOTE ADULT - CARE PLAN
Principal Discharge DX:	CHF (congestive heart failure)  Goal:	Diuresis  Assessment and plan of treatment:	You were admitted with CHF exacerbation and treated with IV lasix. Your improved significantly and are now euvolemic. Continue your medications outpatient as prescribed and see your cardiologist in 1 week.  Secondary Diagnosis:	Blurred vision, right eye  Goal:	Treatment  Assessment and plan of treatment:	You were suspected to have conjunctivitis of the right eye and treated with an antibiotic ointment in patient. Follow up with your primary doctor outpatient.  Secondary Diagnosis:	Hypertension  Goal:	BP Control  Assessment and plan of treatment:	You have a history of hypertension. Follow up with your primary doctor in 1 week and take all medications as prescribed.  Secondary Diagnosis:	Cough  Goal:	Symptom relief  Assessment and plan of treatment:	You complained of a cough. You may continue taking robitussin outpatient. Principal Discharge DX:	CHF (congestive heart failure)  Goal:	Diuresis  Assessment and plan of treatment:	You were admitted with CHF exacerbation and treated with IV lasix. Your improved significantly and are now euvolemic. Continue your lasix 40mg daily as you were outpatient and see your cardiologist in 1 week.  Secondary Diagnosis:	Blurred vision, right eye  Goal:	Resolution  Assessment and plan of treatment:	You were suspected to have conjunctivitis of the right eye and treated with an antibiotic ointment in patient with improvement and resolution. Follow up with your primary doctor outpatient.  Secondary Diagnosis:	Hypertension  Goal:	BP Control  Assessment and plan of treatment:	You have a history of hypertension. We have stopped your medications Imdur and Norsvasc. Follow up with your primary doctor in 1 week and take all medications as outlined in your discharge medications.  Secondary Diagnosis:	Cough  Goal:	Symptom relief  Assessment and plan of treatment:	You complained of a cough. You may continue taking Robitussin outpatient if you need.  Secondary Diagnosis:	CAD (coronary artery disease)  Goal:	Prevention of complications  Assessment and plan of treatment:	You have a history of coronary artery disease. You will continue taking Plavix daily as you were. Follow up with cardiologist in 1 week for outpatient stress test.

## 2018-08-01 NOTE — PROGRESS NOTE ADULT - SUBJECTIVE AND OBJECTIVE BOX
PGY1 Note discussed with supervising resident and primary attending.    Patient is a 85y old  Male who presents with a chief complaint of Shortness of breath (01 Aug 2018 11:22)    INTERVAL HPI/OVERNIGHT EVENTS:    Mr. Leblanc is seen at the bedside. He says he "thinks one of his pills went down the wrong way." Patient taken down to CT scan but was unable to tolerate lying supine. Patient sent for stat bronchoscopy for suspected aspiration of foreign body.    MEDICATIONS  (STANDING):  cloNIDine 0.2 milliGRAM(s) Oral three times a day  clopidogrel Tablet 75 milliGRAM(s) Oral daily  docusate sodium 100 milliGRAM(s) Oral three times a day  furosemide   Injectable 60 milliGRAM(s) IV Push daily  heparin  Injectable 5000 Unit(s) SubCutaneous every 8 hours  hydrALAZINE 25 milliGRAM(s) Oral every 8 hours  metoprolol succinate ER 50 milliGRAM(s) Oral daily  neomycin/BACItracin/polymyxin Ointment 1 Application(s) Right EYE every 6 hours  simvastatin 10 milliGRAM(s) Oral at bedtime    Allergies    aspirin (Anaphylaxis)    Intolerances    REVIEW OF SYSTEMS:  CONSTITUTIONAL: No fever, weight loss, or fatigue  RESPIRATORY: No cough, wheezing, chills or hemoptysis; No shortness of breath  CARDIOVASCULAR: No chest pain, palpitations, dizziness, or leg swelling  GASTROINTESTINAL: No abdominal or epigastric pain. No nausea, vomiting, or hematemesis; No diarrhea or constipation. No melena or hematochezia.  NEUROLOGICAL: No headaches, memory loss, loss of strength, numbness, or tremors  SKIN: No itching, burning, rashes, or lesions     Vital Signs Last 24 Hrs  T(C): 37.4 (01 Aug 2018 15:07), Max: 37.4 (01 Aug 2018 15:07)  T(F): 99.3 (01 Aug 2018 15:07), Max: 99.3 (01 Aug 2018 15:07)  HR: 67 (01 Aug 2018 15:07) (59 - 80)  BP: 149/48 (01 Aug 2018 15:07) (116/42 - 167/53)  BP(mean): --  RR: 18 (01 Aug 2018 15:07) (18 - 20)  SpO2: 100% (01 Aug 2018 15:07) (96% - 100%)    PHYSICAL EXAM:  GENERAL: NAD, well-groomed, well-developed  HEAD:  Atraumatic, Normocephalic  EYES: EOMI, PERRLA, conjunctiva and sclera clear  NECK: Supple, No JVD, Normal thyroid  CHEST/LUNG: wheezing in all lung fields  HEART: Regular rate and rhythm; No murmurs, rubs, or gallops  ABDOMEN: Soft, Nontender, Nondistended; Bowel sounds present  NERVOUS SYSTEM:  Alert & Oriented X3, Good concentration; Motor Strength 5/5 B/L   EXTREMITIES:  2+ Peripheral Pulses, No clubbing, cyanosis; no edema at present - patient says much improved from admission    LABS:                        10.5   8.7   )-----------( 224      ( 01 Aug 2018 09:37 )             32.0     08-01    140  |  100  |  48<H>  ----------------------------<  192<H>  3.9   |  33<H>  |  2.49<H>    Ca    9.7      01 Aug 2018 09:37      RADIOLOGY & ADDITIONAL TESTS:    Imaging Personally Reviewed:  [X ] YES  [ ] NO    Consultant(s) Notes Reviewed:  [X] YES  [ ] NO    CONCLUSIONS:  1. Trace mitral regurgitation.  2.  Trace aortic regurgitation.  3. Moderately dilated left atrium.  LA volume index = 47  cc/m2.  4. Mild concentric left ventricular hypertrophy.  5. Normal left ventricular function. Mild diastolic  dysfunction (stage I).  6. Normal right ventricular size and function. A device  lead is visualized in the right heart.  7. RV systolic pressure is 44 mm Hg. Mild pulmonary  hypertension.    CXR:    IMPRESSION: Unchanged chest as above.    US KIDNEY BLADDER    IMPRESSION:  No left hydronephrosis.     Right nephrectomy

## 2018-08-01 NOTE — DISCHARGE NOTE ADULT - ADDITIONAL INSTRUCTIONS
Follow up with your primary doctor in 1 week. Follow up with your primary doctor in 1 week.  Follow up with cardiologist in 1 week for outpatient stress test.  Follow up with nephrologist outpatient.  Follow up with pulmonologist outpatient.

## 2018-08-01 NOTE — PROGRESS NOTE ADULT - SUBJECTIVE AND OBJECTIVE BOX
Patient seen and examined.     MEDICATIONS  (STANDING):  cloNIDine 0.2 milliGRAM(s) Oral three times a day  clopidogrel Tablet 75 milliGRAM(s) Oral daily  docusate sodium 100 milliGRAM(s) Oral three times a day  furosemide   Injectable 60 milliGRAM(s) IV Push daily  heparin  Injectable 5000 Unit(s) SubCutaneous every 8 hours  hydrALAZINE 25 milliGRAM(s) Oral every 8 hours  metoprolol succinate ER 50 milliGRAM(s) Oral daily  neomycin/BACItracin/polymyxin Ointment 1 Application(s) Right EYE every 6 hours  simvastatin 10 milliGRAM(s) Oral at bedtime      MEDICATIONS  (PRN):     Medications up to date at time of exam.    PHYSICAL EXAMINATION:  Patient has no new complaints.  GENERAL: The patient is a well-developed, well-nourished, in no apparent distress.     Vital Signs Last 24 Hrs  T(C): 36.6 (01 Aug 2018 07:36), Max: 37 (31 Jul 2018 17:05)  T(F): 97.9 (01 Aug 2018 07:36), Max: 98.6 (31 Jul 2018 17:05)  HR: 59 (01 Aug 2018 07:36) (59 - 80)  BP: 116/42 (01 Aug 2018 07:36) (116/42 - 167/53)  BP(mean): --  RR: 20 (01 Aug 2018 07:36) (17 - 20)  SpO2: 100% (01 Aug 2018 07:36) (99% - 100%)   (if applicable)    Chest Tube (if applicable)    HEENT: Head is normocephalic and atraumatic.     NECK: Supple, no palpable adenopathy.    LUNGS: Clear to auscultation, no wheezing, rales, or rhonchi +cough. crackles    HEART: Regular rate and rhythm without murmur.    ABDOMEN: Soft, nontender, and nondistended.      EXTREMITIES: Without any cyanosis, clubbing, rash, lesions or +B/L LE edema    NEUROLOGIC: Awake, alert.    SKIN: Warm, dry, good turgor.    LABS:                        10.5   8.7   )-----------( 224      ( 01 Aug 2018 09:37 )             32.0     08-01    140  |  100  |  48<H>  ----------------------------<  192<H>  3.9   |  33<H>  |  2.49<H>    Ca    9.7      01 Aug 2018 09:37            CARDIAC MARKERS ( 30 Jul 2018 11:03 )  0.024 ng/mL / x     / x     / x     / x            Serum Pro-Brain Natriuretic Peptide: 3084 pg/mL (07-29-18 @ 18:51)          MICROBIOLOGY: (if applicable)    RADIOLOGY & ADDITIONAL STUDIES:  EKG:   CXR:  ECHO:    IMPRESSION: 85y Male PAST MEDICAL & SURGICAL HISTORY:  Stented coronary artery  Pacemaker  CKD (chronic kidney disease)  Hypertension  AICD (automatic cardioverter/defibrillator) present  CAD (coronary artery disease)  Kidney carcinoma: s/p right nephrectomy  Prostate cancer  Kidney carcinoma: s/p right nephrectomy  Hernia: Hernioplasty  Prostate: prostatectomy       80 y/o male from home, ambulates independently with PMHX of CAD, + Stent placed in 2009 on Plavix QOD, AICD placed in 2011, Prostate CA, S/P surgery and RTX, Rt Nephrectomy for Rt Renal cell CA, Htn, HLD  Asthma stable, CKD ( baseline creatinine is 2.0) , Bradycardia (s/p [employer]onic AICD), asthma, CKD, and COPD (not on home O2) S/P Colonoscopy in March 2015 c/w Diverticulosis , HX of Polyps removed, PVD ( s/p balloon left leg stent) came with complain of dyspnea and blurry vision since 1 week. Patient states he is developing worsening shortness of breath since 1 week to an extent that he is unable to walk from room to kitchen, on exertion and on talking. He also developed blurry vision in Rt eye since 1 week with purulent discharge which progressively got worse since 1 week. He denies chest pain, fever, chills, nausea, vomiting, diarrhea, constipation or any other complains.     SOB due to HF exacerbation    +trace pleural effusion  +blurry vision R eye, conjunctivitis? - improving    This morning patient states he believed one of his medications  "went down my wind pipe"    SUGGESTION:      - CXR ordered, concern for aspiration   - diurese   - bronchodilators, o2 supp prn   - f/u bcx   - DVT and GI prophylaxis. Patient seen and examined.     MEDICATIONS  (STANDING):  cloNIDine 0.2 milliGRAM(s) Oral three times a day  clopidogrel Tablet 75 milliGRAM(s) Oral daily  docusate sodium 100 milliGRAM(s) Oral three times a day  furosemide   Injectable 60 milliGRAM(s) IV Push daily  heparin  Injectable 5000 Unit(s) SubCutaneous every 8 hours  hydrALAZINE 25 milliGRAM(s) Oral every 8 hours  metoprolol succinate ER 50 milliGRAM(s) Oral daily  neomycin/BACItracin/polymyxin Ointment 1 Application(s) Right EYE every 6 hours  simvastatin 10 milliGRAM(s) Oral at bedtime      MEDICATIONS  (PRN):     Medications up to date at time of exam.    PHYSICAL EXAMINATION:  Patient has no new complaints.  GENERAL: The patient is a well-developed, well-nourished, in no apparent distress.     Vital Signs Last 24 Hrs  T(C): 36.6 (01 Aug 2018 07:36), Max: 37 (31 Jul 2018 17:05)  T(F): 97.9 (01 Aug 2018 07:36), Max: 98.6 (31 Jul 2018 17:05)  HR: 59 (01 Aug 2018 07:36) (59 - 80)  BP: 116/42 (01 Aug 2018 07:36) (116/42 - 167/53)  BP(mean): --  RR: 20 (01 Aug 2018 07:36) (17 - 20)  SpO2: 100% (01 Aug 2018 07:36) (99% - 100%)   (if applicable)    Chest Tube (if applicable)    HEENT: Head is normocephalic and atraumatic.     NECK: Supple, no palpable adenopathy.    LUNGS: Clear to auscultation, no wheezing, rales, or rhonchi +cough. crackles    HEART: Regular rate and rhythm without murmur.    ABDOMEN: Soft, nontender, and nondistended.      EXTREMITIES: Without any cyanosis, clubbing, rash, lesions or +B/L LE edema    NEUROLOGIC: Awake, alert.    SKIN: Warm, dry, good turgor.    LABS:                        10.5   8.7   )-----------( 224      ( 01 Aug 2018 09:37 )             32.0     08-01    140  |  100  |  48<H>  ----------------------------<  192<H>  3.9   |  33<H>  |  2.49<H>    Ca    9.7      01 Aug 2018 09:37            CARDIAC MARKERS ( 30 Jul 2018 11:03 )  0.024 ng/mL / x     / x     / x     / x            Serum Pro-Brain Natriuretic Peptide: 3084 pg/mL (07-29-18 @ 18:51)          MICROBIOLOGY: (if applicable)    RADIOLOGY & ADDITIONAL STUDIES:  EKG:   CXR:  ECHO:    IMPRESSION: 85y Male PAST MEDICAL & SURGICAL HISTORY:  Stented coronary artery  Pacemaker  CKD (chronic kidney disease)  Hypertension  AICD (automatic cardioverter/defibrillator) present  CAD (coronary artery disease)  Kidney carcinoma: s/p right nephrectomy  Prostate cancer  Kidney carcinoma: s/p right nephrectomy  Hernia: Hernioplasty  Prostate: prostatectomy       80 y/o male from home, ambulates independently with PMHX of CAD, + Stent placed in 2009 on Plavix QOD, AICD placed in 2011, Prostate CA, S/P surgery and RTX, Rt Nephrectomy for Rt Renal cell CA, Htn, HLD  Asthma stable, CKD ( baseline creatinine is 2.0) , Bradycardia (s/p [employer] AICD), asthma, CKD, and COPD (not on home O2) S/P Colonoscopy in March 2015 c/w Diverticulosis , HX of Polyps removed, PVD ( s/p balloon left leg stent) came with complain of dyspnea and blurry vision since 1 week. Patient states he is developing worsening shortness of breath since 1 week to an extent that he is unable to walk from room to kitchen, on exertion and on talking. He also developed blurry vision in Rt eye since 1 week with purulent discharge which progressively got worse since 1 week. He denies chest pain, fever, chills, nausea, vomiting, diarrhea, constipation or any other complains.     SOB due to HF exacerbation    +trace pleural effusion  +blurry vision R eye, conjunctivitis? - improving    This morning patient states he believed one of his medications  "went down my wind pipe"    SUGGESTION:      - CXR ordered, concern for aspiration   - diurese   - bronchodilators, o2 supp prn   - f/u bcx   - DVT and GI prophylaxis.     see icu note   Agree with above assessment and plan as transcribed.

## 2018-08-01 NOTE — PROGRESS NOTE ADULT - SUBJECTIVE AND OBJECTIVE BOX
Patient seen and examined.   Time of visit:    MEDICATIONS  (STANDING):  cloNIDine 0.2 milliGRAM(s) Oral three times a day  clopidogrel Tablet 75 milliGRAM(s) Oral daily  dexamethasone  Injectable 4 milliGRAM(s) IV Push every 6 hours  docusate sodium 100 milliGRAM(s) Oral three times a day  furosemide   Injectable 60 milliGRAM(s) IV Push daily  heparin  Injectable 5000 Unit(s) SubCutaneous every 8 hours  hydrALAZINE 25 milliGRAM(s) Oral every 8 hours  insulin lispro (HumaLOG) corrective regimen sliding scale   SubCutaneous three times a day before meals  metoprolol succinate ER 50 milliGRAM(s) Oral daily  neomycin/BACItracin/polymyxin Ointment 1 Application(s) Right EYE every 6 hours  propofol Infusion 12.626 MICROgram(s)/kG/Min (5 mL/Hr) IV Continuous <Continuous>  simvastatin 10 milliGRAM(s) Oral at bedtime      MEDICATIONS  (PRN):   Medications up to date at time of exam.      PHYSICAL EXAMINATION:  Patient is intubated  GENERAL: The patient is a well-developed, well-nourished, in no apparent distress.     Vital Signs Last 24 Hrs  T(C): 37.3 (01 Aug 2018 20:00), Max: 37.8 (01 Aug 2018 18:00)  T(F): 99.2 (01 Aug 2018 20:00), Max: 100 (01 Aug 2018 18:00)  HR: 76 (01 Aug 2018 20:00) (59 - 82)  BP: 135/43 (01 Aug 2018 20:00) (114/40 - 167/53)  BP(mean): 65 (01 Aug 2018 20:00) (54 - 80)  RR: 17 (01 Aug 2018 20:00) (17 - 27)  SpO2: 100% (01 Aug 2018 20:00) (96% - 100%)  Mode: AC/ CMV (Assist Control/ Continuous Mandatory Ventilation)  RR (machine): 12  TV (machine): 500  FiO2: 100  PEEP: 5  ITime: 1  MAP: 8.5  PIP: 31   (if applicable)      HEENT: Head is normocephalic and atraumatic. Extraocular muscles are intact. Mucous membranes are moist.     NECK: Supple, no palpable adenopathy.    LUNGS: Clear to auscultation, no wheezing, rales, or rhonchi.    HEART: Regular rate and rhythm without murmur.    ABDOMEN: Soft, nontender, and nondistended.  No hepatosplenomegaly is noted.    EXTREMITIES: Without any cyanosis, clubbing, rash, lesions or edema.    NEUROLOGIC: Sedated, s/p bronchoscopy    SKIN: Warm, dry, good turgor.      LABS:                        10.5   8.7   )-----------( 224      ( 01 Aug 2018 09:37 )             32.0     08-01    140  |  100  |  48<H>  ----------------------------<  192<H>  3.9   |  33<H>  |  2.49<H>    Ca    9.7      01 Aug 2018 09:37          ABG - ( 01 Aug 2018 18:28 )  pH, Arterial: 7.49  pH, Blood: x     /  pCO2: 34    /  pO2: 517   / HCO3: 26    / Base Excess: 2.7   /  SaO2: >99.1                           MICROBIOLOGY: (if applicable)    RADIOLOGY & ADDITIONAL STUDIES:  EKG:   CXR:  ECHO:    IMPRESSION: 85y Male PAST MEDICAL & SURGICAL HISTORY:  Stented coronary artery  Pacemaker  CKD (chronic kidney disease)  Hypertension  AICD (automatic cardioverter/defibrillator) present  CAD (coronary artery disease)  Kidney carcinoma: s/p right nephrectomy  Prostate cancer  Kidney carcinoma: s/p right nephrectomy  Hernia: Hernioplasty  Prostate: prostatectomy      80 y/o male from home, ambulates independently with PMHX of CAD, + Stent placed in 2009 on Plavix QOD, AICD placed in 2011, Prostate CA, S/P surgery and RTX, Rt Nephrectomy for Rt Renal cell CA, Htn, HLD  Asthma stable, CKD ( baseline creatinine is 2.0) , Bradycardia (s/p [employer]tr[employer] AICD), asthma, CKD, and COPD (not on home O2) S/P Colonoscopy in March 2015 c/w Diverticulosis , HX of Polyps removed, PVD ( s/p balloon left leg stent) came with complain of dyspnea and blurry vision since 1 week. Patient states he is developing worsening shortness of breath since 1 week to an extent that he is unable to walk from room to kitchen, on exertion and on talking. He also developed blurry vision in Rt eye since 1 week with purulent discharge which progressively got worse since 1 week. He denies chest pain, fever, chills, nausea, vomiting, diarrhea, constipation or any other complains.         RECOMMENDATIONS:    Patient is my office patient, he presents with SOB due to HF exacerbation. Pbnp noted to be 3,000. Patient previously had an ICD, which was removed due to infection. He subsequently had PPM inserted into R ACW as his EF recovered. Admit to tele, 2D echo, stress test. Patient was initially scheduled for ST as an out patient.    Patient had emergent bronchoscopy today for possible foreign body aspiration, remains intubated in ICU.    Change lasix to 20mg IVP and then to PO when possible.

## 2018-08-01 NOTE — PROGRESS NOTE ADULT - SUBJECTIVE AND OBJECTIVE BOX
Patient is a 85y old  Male who presents with a chief complaint of Shortness of breath (30 Jul 2018 04:10)    pt seen in ed tele [ x ], reg med floor [   ], bed [x  ], chair at bedside [   ], a+o x3 [x  ], lethargic [  ],  nad [ x ]    Allergies    aspirin (Anaphylaxis)        Vitals    T(F): 97.9 (08-01-18 @ 07:36), Max: 98.6 (07-31-18 @ 17:05)  HR: 59 (08-01-18 @ 07:36) (59 - 80)  BP: 116/42 (08-01-18 @ 07:36) (116/42 - 167/53)  RR: 20 (08-01-18 @ 07:36) (17 - 20)  SpO2: 100% (08-01-18 @ 07:36) (99% - 100%)  Wt(kg): --  CAPILLARY BLOOD GLUCOSE          Labs                          10.5   x     )-----------( 224      ( 01 Aug 2018 09:37 )             32.0       07-31    143  |  104  |  49<H>  ----------------------------<  131<H>  3.9   |  33<H>  |  2.34<H>    Ca    9.8      31 Jul 2018 06:32        CARDIAC MARKERS ( 30 Jul 2018 11:03 )  0.024 ng/mL / x     / x     / x     / x                Radiology Results      Meds    MEDICATIONS  (STANDING):  cloNIDine 0.2 milliGRAM(s) Oral three times a day  clopidogrel Tablet 75 milliGRAM(s) Oral daily  docusate sodium 100 milliGRAM(s) Oral three times a day  furosemide   Injectable 60 milliGRAM(s) IV Push daily  heparin  Injectable 5000 Unit(s) SubCutaneous every 8 hours  hydrALAZINE 25 milliGRAM(s) Oral every 8 hours  metoprolol succinate ER 50 milliGRAM(s) Oral daily  neomycin/BACItracin/polymyxin Ointment 1 Application(s) Right EYE every 6 hours  simvastatin 10 milliGRAM(s) Oral at bedtime      MEDICATIONS  (PRN):      Physical Exam    Neuro :  no focal deficits  Respiratory: CTA B/L  CV: RRR, S1S2, no murmurs,   Abdominal: Soft, NT, ND +BS,  Extremities: No edema, + peripheral pulses    ASSESSMENT      chf exacerb,   left pleural eff,   acute on ckd,   right eye conjunctivitis,   h/o CAD, + Stent placed in 2009 on Plavix QOD,  AICD placed in 2011,   Prostate CA S/P surgery and RTX,   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 Patient is a 85y old  Male who presents with a chief complaint of Shortness of breath (30 Jul 2018 04:10)    pt seen in tele [ x ], reg med floor [   ], bed [x  ], chair at bedside [   ], a+o x3 [x  ], lethargic [  ],  nad [ x ]    c/o aspirating a pill this am and it feels as if it is stuck in his airway. also pt has change in voice    Allergies    aspirin (Anaphylaxis)        Vitals    T(F): 97.9 (08-01-18 @ 07:36), Max: 98.6 (07-31-18 @ 17:05)  HR: 59 (08-01-18 @ 07:36) (59 - 80)  BP: 116/42 (08-01-18 @ 07:36) (116/42 - 167/53)  RR: 20 (08-01-18 @ 07:36) (17 - 20)  SpO2: 100% (08-01-18 @ 07:36) (99% - 100%)  Wt(kg): --  CAPILLARY BLOOD GLUCOSE          Labs                          10.5   x     )-----------( 224      ( 01 Aug 2018 09:37 )             32.0       07-31    143  |  104  |  49<H>  ----------------------------<  131<H>  3.9   |  33<H>  |  2.34<H>    Ca    9.8      31 Jul 2018 06:32        CARDIAC MARKERS ( 30 Jul 2018 11:03 )  0.024 ng/mL / x     / x     / x     / x          < from: Transthoracic Echocardiogram (07.31.18 @ 07:18) >  CONCLUSIONS:  1. Trace mitral regurgitation.  2.  Trace aortic regurgitation.  3. Moderately dilated left atrium.  LA volume index = 47  cc/m2.  4. Mild concentric left ventricular hypertrophy.  5. Normal left ventricular function. Mild diastolic  dysfunction (stage I).  6. Normal right ventricular size and function. A device  lead is visualized in the right heart.  7. RV systolic pressure is 44 mm Hg. Mild pulmonary  hypertension.    < end of copied text >  < from: Transthoracic Echocardiogram (07.31.18 @ 07:18) >  Ejection Fraction Visual Estimate: >55 %    < end of copied text >      Radiology Results    < from: Xray Chest 1 View- PORTABLE-Urgent (08.01.18 @ 11:12) >  Somewhat elevated diaphragms crowd the chest. Colonicinterposition under   the right hemidiaphragm again noted.    Heart is magnified by technique. Right-sided pacer device again seen.    Slight linear scar or atelectasis off left heart border again noted.    Minimal blunting of the left base laterally again seen.    Chest is similar to July 29.    IMPRESSION: Unchanged chest as above.    < end of copied text >        Meds    MEDICATIONS  (STANDING):  cloNIDine 0.2 milliGRAM(s) Oral three times a day  clopidogrel Tablet 75 milliGRAM(s) Oral daily  docusate sodium 100 milliGRAM(s) Oral three times a day  furosemide   Injectable 60 milliGRAM(s) IV Push daily  heparin  Injectable 5000 Unit(s) SubCutaneous every 8 hours  hydrALAZINE 25 milliGRAM(s) Oral every 8 hours  metoprolol succinate ER 50 milliGRAM(s) Oral daily  neomycin/BACItracin/polymyxin Ointment 1 Application(s) Right EYE every 6 hours  simvastatin 10 milliGRAM(s) Oral at bedtime      MEDICATIONS  (PRN):      Physical Exam    Neuro :  no focal deficits  Respiratory: mild basal crackles, no significant stridor  CV: RRR, S1S2, no murmurs,   Abdominal: Soft, NT, ND +BS,  Extremities: No edema, + peripheral pulses    ASSESSMENT      chf exacerb,   left pleural eff,   acute on ckd,   right eye conjunctivitis,   r/o aspir of foreign body  h/o CAD, + Stent placed in 2009 on Plavix QOD,  AICD placed in 2011,   Prostate CA S/P surgery and RTX,   Rt Nephrectomy for Rt Renal cell CA,   Htn,   HLD    Asthma   CKD ( baseline creatinine is 2.0) ,   Bradycardia (s/p [employer] AICD),   COPD (not on home O2)   HX of Polyps removed, PVD ( s/p balloon left leg stent)         PLAN    cont tele,   acs protocol,   cont lopressor, lasix, plavix, and statin,   cardio f/u noted  echo with Ejection Fraction Visual Estimate: >55 %, Mild diastolic dysfunction (stage I) Mild pulmonary hypertension noted above.  f/u stress test  ce q8 x3 noted   pulm f/u   repeat cxr with no significant change from previous  f/u ct neck and chest to r/o aspiration  renal cons noted  phys tx   cont current meds

## 2018-08-01 NOTE — CONSULT NOTE ADULT - ASSESSMENT
82 y/o male from home, ambulates independently with PMHX of CAD, + Stent placed in 2009 on Plavix QOD, AICD placed in 2011, Prostate CA, S/P surgery and RTX, Rt Nephrectomy for Rt Renal cell CA, Htn, HLD  Asthma stable, CKD ( baseline creatinine is 2.0) , Bradycardia (s/p [employer]onic AICD), asthma, CKD, and COPD (not on home O2) S/P Colonoscopy in March 2015 c/w Diverticulosis , HX of Polyps removed, PVD ( s/p balloon left leg stent) came with complain of dyspnea and blurry vision since 1 week. 80 y/o male from home, ambulates independently with PMHX of CAD, + Stent placed in 2009 on Plavix QOD, AICD placed in 2011, Prostate CA, S/P surgery and RTX, Rt Nephrectomy for Rt Renal cell CA, Htn, HLD  Asthma stable, CKD ( baseline creatinine is 2.0) , Bradycardia (s/p [employer]onic AICD), asthma, CKD, and COPD (not on home O2) S/P Colonoscopy in March 2015 c/w Diverticulosis , HX of Polyps removed, PVD ( s/p balloon left leg stent) came with complain of dyspnea and blurry vision since 1 week.    ICU was consulted because of vocal cord swelling seen on bronchoscopy.

## 2018-08-01 NOTE — DISCHARGE NOTE ADULT - HOSPITAL COURSE
82 y/o male from home, ambulates independently with PMHX of CAD, + Stent placed in 2009 on Plavix QOD, AICD placed in 2011, Prostate CA, S/P surgery and RTX, Rt Nephrectomy for Rt Renal cell CA, Htn, HLD  Asthma stable, CKD ( baseline creatinine is 2.0) , Bradycardia (s/p [employer] AICD), asthma, CKD, and COPD (not on home O2) S/P Colonoscopy in March 2015 c/w Diverticulosis , HX of Polyps removed, PVD ( s/p balloon left leg stent) came with complain of dyspnea and blurry vision since 1 week. Patient states he is developing worsening shortness of breath since 1 week to an extent that he is unable to walk from room to kitchen, on exertion and on talking. He also developed blurry vision in Rt eye since 1 week with purulent discharge which progressively got worse since 1 week. He denies chest pain, fever, chills, nausea, vomiting, diarrhea, constipation or any other complains.     In ED, patient's vital signs were remarkable for BP of 168/50, HR of 60, Temp normal, Labs were significant for HB 9.5, Cardiac enzymes x 2 negative, Cr: 2.26. EKG showed NSR, RBBB, No ST-T wave changes, paced rhythm, Patient was given 80 IV lasix push on admission    Patient was improved significantly on day 1 after IV lasix, but aspirated a medication. He coughed it out but had significant difficulty breathing. Urgent bronchoscopy performed and showed swelling of vocal cords. Patient intubated and taken to ICU. Patient downgraded back to medicine floor after self extubation. He is now euvolemic and stable. Watching for one more day to monitor respiratory status. Patient ok to be discharged. 82 y/o male from home, ambulates independently with PMHX of CAD, + Stent placed in 2009 on Plavix QOD, AICD placed in 2011, Prostate CA, S/P surgery and RTX, Rt Nephrectomy for Rt Renal cell CA, Htn, HLD  Asthma stable, CKD ( baseline creatinine is 2.0) , Bradycardia (s/p [employer]onic AICD), asthma, CKD, and COPD (not on home O2) S/P Colonoscopy in March 2015 c/w Diverticulosis , HX of Polyps removed, PVD ( s/p balloon left leg stent) came with complain of dyspnea and blurry vision since 1 week. Patient states he is developing worsening shortness of breath since 1 week to an extent that he is unable to walk from room to kitchen, on exertion and on talking. He also developed blurry vision in Rt eye since 1 week with purulent discharge which progressively got worse since 1 week. He denies chest pain, fever, chills, nausea, vomiting, diarrhea, constipation or any other complains.     In ED, patient's vital signs were remarkable for BP of 168/50, HR of 60, Temp normal, Labs were significant for HB 9.5, Cardiac enzymes x 2 negative, Cr: 2.26. EKG showed NSR, RBBB, No ST-T wave changes, paced rhythm, Patient was given 80 IV lasix push on admission    Patient was improved significantly on day 1 after IV lasix, but aspirated a medication. He coughed it out but had significant difficulty breathing. Urgent bronchoscopy performed and showed swelling of vocal cords. Patient intubated and taken to ICU. Started on decadron, with marked improvement. Patient downgraded back to medicine floor after self extubation. He is now euvolemic and stable. Watched for one more day to monitor respiratory status. Patient ok to be discharged.     Patient is now medically stable to be discharged. Discussed with Dr. Domingo. 80 y/o male from home, ambulates independently with PMHX of CAD, + Stent placed in 2009 on Plavix QOD, AICD placed in 2011, Prostate CA, S/P surgery and RTX, Rt Nephrectomy for Rt Renal cell CA, Htn, HLD  Asthma stable, CKD ( baseline creatinine is 2.0) , Bradycardia (s/p [employer]onic AICD), CHFpEF, asthma, CKD, and COPD (not on home O2) S/P Colonoscopy in March 2015 c/w Diverticulosis , HX of Polyps removed, PVD ( s/p balloon left leg stent) came with complain of dyspnea and blurry vision since 1 week. Patient states he is developing worsening shortness of breath since 1 week to an extent that he is unable to walk from room to kitchen, on exertion and on talking. He also developed blurry vision in Rt eye since 1 week with purulent discharge which progressively got worse since 1 week. He denies chest pain, fever, chills, nausea, vomiting, diarrhea, constipation or any other complains.     In ED, patient's vital signs were remarkable for BP of 168/50, HR of 60, Temp normal, Labs were significant for HB 9.5, Cardiac enzymes x 2 negative, Cr: 2.26. EKG showed NSR, RBBB, No ST-T wave changes, paced rhythm, Patient was given 80 IV lasix push on admission for treatment of acute on chronic diastolic heart failure.    Patient was improved significantly on day 1 after IV lasix, but aspirated a medication. He coughed it out but had significant difficulty breathing. Urgent bronchoscopy performed and showed swelling of vocal cords. Patient intubated and taken to ICU. Started on decadron, with marked improvement. Patient downgraded back to medicine floor after self extubation. He is now euvolemic and stable. Watched for one more day to monitor respiratory status. Patient ok to be discharged.     Patient is now medically stable to be discharged. Discussed with Dr. Domingo.

## 2018-08-01 NOTE — PROGRESS NOTE ADULT - ASSESSMENT
A/P:  1.CKD: stage 4 ,r/o secondary to htn plus r/o FSGS post Rt .Nephrectomy induced  -pts egfr is mildly below his baseline ,r/o secondary to chf  -Keep patient euvolemic and renal diet  -Avoid Nephrotoxic Meds/ Agents such as (NSAIDs, IV contrast, Aminoglycosides such as gentamicin, -Gadolinium contrast, Phosphate containing enemas, etc..)  -Adjust Medications according to eGFR  -f/u bmp daily  -f/u urine studies  2.HTN: bp is controlled  -keep bp>110/70 and <140/80  3.EDEMA: mild  -add Lasix 60 mg daily  -add fluid restriction to 1 liter per day  4.Alkalosis: secondary to diuretics, r/o primary Resp.acidosis  -suggest ABG to check ph  -f/u co2 daily  5.ANEMIA: mild  -f/u Hb daily  -add epogen if Hb <10 gm A/P:  1.CKD: stage 4 ,r/o secondary to htn plus r/o FSGS post Rt .Nephrectomy induced  -pts egfr is mildly below his baseline ,r/o secondary to chf  -Keep patient euvolemic and renal diet  -Avoid Nephrotoxic Meds/ Agents such as (NSAIDs, IV contrast, Aminoglycosides such as gentamicin, -Gadolinium contrast, Phosphate containing enemas, etc..)  -Adjust Medications according to eGFR  -f/u bmp daily  -f/u urine studies  2.HTN: bp is controlled  -keep bp>110/70 and <140/80  3.EDEMA: mild  -continue  Lasix 60 mg daily  -continue  fluid restriction to 1 liter per day  4.Alkalosis: secondary to diuretics, r/o primary Resp.acidosis  -suggest ABG to check ph  -f/u co2 daily  5.ANEMIA: mild  -f/u Hb daily  -add epogen if Hb <10 gm

## 2018-08-02 DIAGNOSIS — J96.90 RESPIRATORY FAILURE, UNSPECIFIED, UNSPECIFIED WHETHER WITH HYPOXIA OR HYPERCAPNIA: ICD-10-CM

## 2018-08-02 LAB
ANION GAP SERPL CALC-SCNC: 10 MMOL/L — SIGNIFICANT CHANGE UP (ref 5–17)
BASE EXCESS BLDA CALC-SCNC: 2.6 MMOL/L — HIGH (ref -2–2)
BLOOD GAS COMMENTS ARTERIAL: SIGNIFICANT CHANGE UP
BUN SERPL-MCNC: 57 MG/DL — HIGH (ref 7–18)
CALCIUM SERPL-MCNC: 9.2 MG/DL — SIGNIFICANT CHANGE UP (ref 8.4–10.5)
CHLORIDE SERPL-SCNC: 100 MMOL/L — SIGNIFICANT CHANGE UP (ref 96–108)
CHLORIDE UR-SCNC: 21 MMOL/L — LOW (ref 55–125)
CO2 SERPL-SCNC: 29 MMOL/L — SIGNIFICANT CHANGE UP (ref 22–31)
CREAT ?TM UR-MCNC: 124 MG/DL — SIGNIFICANT CHANGE UP
CREAT SERPL-MCNC: 3.49 MG/DL — HIGH (ref 0.5–1.3)
GLUCOSE BLDC GLUCOMTR-MCNC: 138 MG/DL — HIGH (ref 70–99)
GLUCOSE BLDC GLUCOMTR-MCNC: 204 MG/DL — HIGH (ref 70–99)
GLUCOSE BLDC GLUCOMTR-MCNC: 298 MG/DL — HIGH (ref 70–99)
GLUCOSE SERPL-MCNC: 177 MG/DL — HIGH (ref 70–99)
HCO3 BLDA-SCNC: 27 MMOL/L — SIGNIFICANT CHANGE UP (ref 23–27)
HCT VFR BLD CALC: 29.5 % — LOW (ref 39–50)
HGB BLD-MCNC: 9.8 G/DL — LOW (ref 13–17)
HOROWITZ INDEX BLDA+IHG-RTO: 36 — SIGNIFICANT CHANGE UP
MAGNESIUM SERPL-MCNC: 2.2 MG/DL — SIGNIFICANT CHANGE UP (ref 1.6–2.6)
MCHC RBC-ENTMCNC: 29.8 PG — SIGNIFICANT CHANGE UP (ref 27–34)
MCHC RBC-ENTMCNC: 33.2 GM/DL — SIGNIFICANT CHANGE UP (ref 32–36)
MCV RBC AUTO: 90 FL — SIGNIFICANT CHANGE UP (ref 80–100)
OSMOLALITY UR: 331 MOS/KG — SIGNIFICANT CHANGE UP (ref 50–1200)
PCO2 BLDA: 44 MMHG — SIGNIFICANT CHANGE UP (ref 32–46)
PH BLDA: 7.4 — SIGNIFICANT CHANGE UP (ref 7.35–7.45)
PHOSPHATE SERPL-MCNC: 3.1 MG/DL — SIGNIFICANT CHANGE UP (ref 2.5–4.5)
PLATELET # BLD AUTO: 172 K/UL — SIGNIFICANT CHANGE UP (ref 150–400)
PO2 BLDA: 150 MMHG — HIGH (ref 74–108)
POTASSIUM SERPL-MCNC: 3.6 MMOL/L — SIGNIFICANT CHANGE UP (ref 3.5–5.3)
POTASSIUM SERPL-SCNC: 3.6 MMOL/L — SIGNIFICANT CHANGE UP (ref 3.5–5.3)
PROT ?TM UR-MCNC: 125 MG/DL — HIGH (ref 0–12)
RBC # BLD: 3.28 M/UL — LOW (ref 4.2–5.8)
RBC # FLD: 13.2 % — SIGNIFICANT CHANGE UP (ref 10.3–14.5)
SAO2 % BLDA: >99 % — SIGNIFICANT CHANGE UP (ref 92–96)
SODIUM SERPL-SCNC: 139 MMOL/L — SIGNIFICANT CHANGE UP (ref 135–145)
SODIUM UR-SCNC: 33 MMOL/L — LOW (ref 40–220)
URATE UR-MCNC: 17.1 MG/DL — SIGNIFICANT CHANGE UP
WBC # BLD: 8.8 K/UL — SIGNIFICANT CHANGE UP (ref 3.8–10.5)
WBC # FLD AUTO: 8.8 K/UL — SIGNIFICANT CHANGE UP (ref 3.8–10.5)

## 2018-08-02 PROCEDURE — 71045 X-RAY EXAM CHEST 1 VIEW: CPT | Mod: 26

## 2018-08-02 RX ORDER — ACETAMINOPHEN 500 MG
1000 TABLET ORAL ONCE
Qty: 0 | Refills: 0 | Status: COMPLETED | OUTPATIENT
Start: 2018-08-02 | End: 2018-08-02

## 2018-08-02 RX ORDER — DEXAMETHASONE 0.5 MG/5ML
10 ELIXIR ORAL EVERY 8 HOURS
Qty: 0 | Refills: 0 | Status: COMPLETED | OUTPATIENT
Start: 2018-08-02 | End: 2018-08-03

## 2018-08-02 RX ORDER — DEXAMETHASONE 0.5 MG/5ML
6 ELIXIR ORAL ONCE
Qty: 0 | Refills: 0 | Status: DISCONTINUED | OUTPATIENT
Start: 2018-08-02 | End: 2018-08-02

## 2018-08-02 RX ORDER — FUROSEMIDE 40 MG
40 TABLET ORAL DAILY
Qty: 0 | Refills: 0 | Status: DISCONTINUED | OUTPATIENT
Start: 2018-08-03 | End: 2018-08-03

## 2018-08-02 RX ORDER — FUROSEMIDE 40 MG
20 TABLET ORAL DAILY
Qty: 0 | Refills: 0 | Status: DISCONTINUED | OUTPATIENT
Start: 2018-08-02 | End: 2018-08-02

## 2018-08-02 RX ORDER — IPRATROPIUM/ALBUTEROL SULFATE 18-103MCG
3 AEROSOL WITH ADAPTER (GRAM) INHALATION EVERY 6 HOURS
Qty: 0 | Refills: 0 | Status: DISCONTINUED | OUTPATIENT
Start: 2018-08-02 | End: 2018-08-04

## 2018-08-02 RX ORDER — DEXAMETHASONE 0.5 MG/5ML
10 ELIXIR ORAL EVERY 8 HOURS
Qty: 0 | Refills: 0 | Status: DISCONTINUED | OUTPATIENT
Start: 2018-08-02 | End: 2018-08-02

## 2018-08-02 RX ORDER — DEXMEDETOMIDINE HYDROCHLORIDE IN 0.9% SODIUM CHLORIDE 4 UG/ML
0.2 INJECTION INTRAVENOUS
Qty: 200 | Refills: 0 | Status: DISCONTINUED | OUTPATIENT
Start: 2018-08-02 | End: 2018-08-02

## 2018-08-02 RX ADMIN — Medication 1 APPLICATION(S): at 18:00

## 2018-08-02 RX ADMIN — CLOPIDOGREL BISULFATE 75 MILLIGRAM(S): 75 TABLET, FILM COATED ORAL at 12:29

## 2018-08-02 RX ADMIN — HEPARIN SODIUM 5000 UNIT(S): 5000 INJECTION INTRAVENOUS; SUBCUTANEOUS at 14:20

## 2018-08-02 RX ADMIN — Medication 0.2 MILLIGRAM(S): at 21:20

## 2018-08-02 RX ADMIN — Medication 60 MILLIGRAM(S): at 05:12

## 2018-08-02 RX ADMIN — Medication 10 MILLIGRAM(S): at 09:30

## 2018-08-02 RX ADMIN — Medication 10 MILLIGRAM(S): at 17:00

## 2018-08-02 RX ADMIN — Medication 100 MILLIGRAM(S): at 21:20

## 2018-08-02 RX ADMIN — HEPARIN SODIUM 5000 UNIT(S): 5000 INJECTION INTRAVENOUS; SUBCUTANEOUS at 05:14

## 2018-08-02 RX ADMIN — Medication 1 APPLICATION(S): at 12:30

## 2018-08-02 RX ADMIN — Medication 0.2 MILLIGRAM(S): at 14:20

## 2018-08-02 RX ADMIN — Medication 3 MILLILITER(S): at 05:13

## 2018-08-02 RX ADMIN — Medication 25 MILLIGRAM(S): at 14:20

## 2018-08-02 RX ADMIN — Medication 400 MILLIGRAM(S): at 02:12

## 2018-08-02 RX ADMIN — HEPARIN SODIUM 5000 UNIT(S): 5000 INJECTION INTRAVENOUS; SUBCUTANEOUS at 21:20

## 2018-08-02 RX ADMIN — Medication 1 APPLICATION(S): at 05:14

## 2018-08-02 RX ADMIN — Medication 2: at 05:14

## 2018-08-02 RX ADMIN — Medication 3: at 17:59

## 2018-08-02 RX ADMIN — Medication 3 MILLILITER(S): at 20:30

## 2018-08-02 RX ADMIN — SIMVASTATIN 10 MILLIGRAM(S): 20 TABLET, FILM COATED ORAL at 21:20

## 2018-08-02 RX ADMIN — DEXMEDETOMIDINE HYDROCHLORIDE IN 0.9% SODIUM CHLORIDE 3.3 MICROGRAM(S)/KG/HR: 4 INJECTION INTRAVENOUS at 03:19

## 2018-08-02 RX ADMIN — Medication 25 MILLIGRAM(S): at 21:20

## 2018-08-02 NOTE — PROGRESS NOTE ADULT - PROBLEM SELECTOR PLAN 3
C/w Clonidine 0.2mg TID, hydralazine 25 mg TID and metoprolol 25 daily  Lasix changed to 20mg as per cardio  Goal Bp <140/90.

## 2018-08-02 NOTE — PROGRESS NOTE ADULT - SUBJECTIVE AND OBJECTIVE BOX
Patient is a 85y old  Male who presents with a chief complaint of Shortness of breath (01 Aug 2018 11:22)    pt seen in icu [  ], reg med floor [   ], bed [  ], chair at bedside [   ], a+o x3 [  ], lethargic [  ],  nad [  ]    castillo [  ], ngt [  ], peg [  ], et tube [  ], cent line [  ], picc line [  ]        Allergies    aspirin (Anaphylaxis)        Vitals    T(F): 97.9 (08-02-18 @ 06:06), Max: 100.9 (08-02-18 @ 00:08)  HR: 76 (08-02-18 @ 07:00) (59 - 82)  BP: 102/57 (08-02-18 @ 07:00) (82/35 - 157/53)  RR: 22 (08-02-18 @ 07:00) (12 - 31)  SpO2: 100% (08-02-18 @ 07:00) (85% - 100%)  Wt(kg): --  CAPILLARY BLOOD GLUCOSE      POCT Blood Glucose.: 204 mg/dL (02 Aug 2018 05:08)      Labs                          9.8    8.8   )-----------( 172      ( 02 Aug 2018 04:47 )             29.5       08-02    139  |  100  |  57<H>  ----------------------------<  177<H>  3.6   |  29  |  3.49<H>    Ca    9.2      02 Aug 2018 04:47  Phos  3.1     08-02  Mg     2.2     08-02                  Radiology Results      Meds    MEDICATIONS  (STANDING):  ALBUTerol/ipratropium for Nebulization 3 milliLiter(s) Nebulizer every 6 hours  cloNIDine 0.2 milliGRAM(s) Oral three times a day  clopidogrel Tablet 75 milliGRAM(s) Oral daily  dexamethasone  Injectable 10 milliGRAM(s) IV Push every 8 hours  docusate sodium 100 milliGRAM(s) Oral three times a day  furosemide   Injectable 60 milliGRAM(s) IV Push daily  heparin  Injectable 5000 Unit(s) SubCutaneous every 8 hours  hydrALAZINE 25 milliGRAM(s) Oral every 8 hours  insulin lispro (HumaLOG) corrective regimen sliding scale   SubCutaneous every 6 hours  metoprolol succinate ER 50 milliGRAM(s) Oral daily  neomycin/BACItracin/polymyxin Ointment 1 Application(s) Right EYE every 6 hours  simvastatin 10 milliGRAM(s) Oral at bedtime      MEDICATIONS  (PRN):      Physical Exam      Neuro :  no focal deficits  Respiratory: mild basal crackles, no significant stridor  CV: RRR, S1S2, no murmurs,   Abdominal: Soft, NT, ND +BS,  Extremities: No edema, + peripheral pulses    ASSESSMENT      chf exacerb,   left pleural eff,   acute on ckd,   right eye conjunctivitis,   r/o aspir of foreign body  h/o CAD, + Stent placed in 2009 on Plavix QOD,  AICD placed in 2011,   Prostate CA S/P surgery and RTX,   Rt Nephrectomy for Rt Renal cell CA,   Htn,   HLD    Asthma   CKD ( baseline creatinine is 2.0) ,   Bradycardia (s/p [employer] AICD),   COPD (not on home O2)   HX of Polyps removed, PVD ( s/p balloon left leg stent)         PLAN    cont icu  cont lopressor, lasix, plavix, and statin,   cardio f/u   echo with Ejection Fraction Visual Estimate: >55 %, Mild diastolic dysfunction (stage I) Mild pulmonary hypertension noted above.  f/u stress test  ce q8 x3 noted   pulm f/u   repeat cxr with no significant change from previous  ct neck and chest to r/o aspiration cancelled as pt could not tolerate procedure so pt had to have emergent bronchoscopy  s/p bronch  renal f/u  cont current meds   mgmt as per icu Patient is a 85y old  Male who presents with a chief complaint of Shortness of breath (01 Aug 2018 11:22)    pt seen in icu [ x ], reg med floor [   ], bed [  ], chair at bedside [ x  ], a+o x3 [ x ], lethargic [  ],  nad [x  ]    castillo [x  ],     pt s/p self extubation overnight, pt's voice getting back to normal        Allergies    aspirin (Anaphylaxis)        Vitals    T(F): 97.9 (08-02-18 @ 06:06), Max: 100.9 (08-02-18 @ 00:08)  HR: 76 (08-02-18 @ 07:00) (59 - 82)  BP: 102/57 (08-02-18 @ 07:00) (82/35 - 157/53)  RR: 22 (08-02-18 @ 07:00) (12 - 31)  SpO2: 100% (08-02-18 @ 07:00) (85% - 100%)  Wt(kg): --  CAPILLARY BLOOD GLUCOSE      POCT Blood Glucose.: 204 mg/dL (02 Aug 2018 05:08)      Labs                          9.8    8.8   )-----------( 172      ( 02 Aug 2018 04:47 )             29.5       08-02    139  |  100  |  57<H>  ----------------------------<  177<H>  3.6   |  29  |  3.49<H>    Ca    9.2      02 Aug 2018 04:47  Phos  3.1     08-02  Mg     2.2     08-02                  Radiology Results      Meds    MEDICATIONS  (STANDING):  ALBUTerol/ipratropium for Nebulization 3 milliLiter(s) Nebulizer every 6 hours  cloNIDine 0.2 milliGRAM(s) Oral three times a day  clopidogrel Tablet 75 milliGRAM(s) Oral daily  dexamethasone  Injectable 10 milliGRAM(s) IV Push every 8 hours  docusate sodium 100 milliGRAM(s) Oral three times a day  furosemide   Injectable 60 milliGRAM(s) IV Push daily  heparin  Injectable 5000 Unit(s) SubCutaneous every 8 hours  hydrALAZINE 25 milliGRAM(s) Oral every 8 hours  insulin lispro (HumaLOG) corrective regimen sliding scale   SubCutaneous every 6 hours  metoprolol succinate ER 50 milliGRAM(s) Oral daily  neomycin/BACItracin/polymyxin Ointment 1 Application(s) Right EYE every 6 hours  simvastatin 10 milliGRAM(s) Oral at bedtime      MEDICATIONS  (PRN):      Physical Exam      Neuro :  no focal deficits  Respiratory: cta b/l  CV: RRR, S1S2, no murmurs,   Abdominal: Soft, NT, ND +BS,  Extremities: No edema, + peripheral pulses    ASSESSMENT      chf exacerb,   left pleural eff,   acute on ckd,   right eye conjunctivitis,   r/o aspir of foreign body  h/o CAD, + Stent placed in 2009 on Plavix QOD,  AICD placed in 2011,   Prostate CA S/P surgery and RTX,   Rt Nephrectomy for Rt Renal cell CA,   Htn,   HLD    Asthma   CKD ( baseline creatinine is 2.0) ,   Bradycardia (s/p [employer] AICD),   COPD (not on home O2)   HX of Polyps removed, PVD ( s/p balloon left leg stent)         PLAN    cont icu  cont lopressor, lasix, plavix, and statin,   cardio f/u   echo with Ejection Fraction Visual Estimate: >55 %, Mild diastolic dysfunction (stage I) Mild pulmonary hypertension noted above.  f/u stress test  ce q8 x3 noted   pulm f/u   repeat cxr with no significant change from previous  ct neck and chest to r/o aspiration cancelled as pt could not tolerate procedure so pt had to have emergent bronchoscopy  s/p bronch showing vocal cord swelling  ent cons noted  cont decadron x 3 doses  renal f/u  cont current meds   mgmt as per icu

## 2018-08-02 NOTE — DIETITIAN INITIAL EVALUATION ADULT. - OTHER INFO
NKFA. Food preferences for when diet advanced. Pt with poor dentition. Reports son will bring dentures

## 2018-08-02 NOTE — PROGRESS NOTE ADULT - ATTENDING COMMENTS
s/p bronch POD #1 for impending resp failure due to asp of foreign material ( tablet).   Bronch was neg, and pat self extubated this AM.  Today clinically improved, voice is better and tolerating diet.  will crush tablets  continue meds  spoke  with son at bedside

## 2018-08-02 NOTE — PROGRESS NOTE ADULT - SUBJECTIVE AND OBJECTIVE BOX
Patient seen and examined.       MEDICATIONS  (STANDING):  ALBUTerol/ipratropium for Nebulization 3 milliLiter(s) Nebulizer every 6 hours  cloNIDine 0.2 milliGRAM(s) Oral three times a day  clopidogrel Tablet 75 milliGRAM(s) Oral daily  dexamethasone  Injectable 10 milliGRAM(s) IV Push every 8 hours  docusate sodium 100 milliGRAM(s) Oral three times a day  heparin  Injectable 5000 Unit(s) SubCutaneous every 8 hours  hydrALAZINE 25 milliGRAM(s) Oral every 8 hours  insulin lispro (HumaLOG) corrective regimen sliding scale   SubCutaneous every 6 hours  metoprolol succinate ER 50 milliGRAM(s) Oral daily  neomycin/BACItracin/polymyxin Ointment 1 Application(s) Right EYE every 6 hours  simvastatin 10 milliGRAM(s) Oral at bedtime      MEDICATIONS  (PRN):   Medications up to date at time of exam.      PHYSICAL EXAMINATION:  Patient has no new complaints.  GENERAL: The patient is a well-developed, well-nourished, in no apparent distress.     Vital Signs Last 24 Hrs  T(C): 36.7 (02 Aug 2018 18:46), Max: 38.3 (02 Aug 2018 00:08)  T(F): 98.1 (02 Aug 2018 18:46), Max: 100.9 (02 Aug 2018 00:08)  HR: 77 (02 Aug 2018 18:46) (60 - 78)  BP: 143/50 (02 Aug 2018 18:46) (82/35 - 157/53)  BP(mean): 52 (02 Aug 2018 18:00) (45 - 92)  RR: 20 (02 Aug 2018 18:46) (12 - 31)  SpO2: 100% (02 Aug 2018 18:46) (85% - 100%)  Mode: standby   (if applicable)      HEENT: Head is normocephalic and atraumatic.     NECK: Supple, no palpable adenopathy.    LUNGS: Clear to auscultation, no wheezing, rales, or rhonchi.    HEART: Regular rate and rhythm without murmur.    ABDOMEN: Soft, nontender, and nondistended.  No hepatosplenomegaly is noted.    EXTREMITIES: Without any cyanosis, clubbing, rash, lesions or edema.    NEUROLOGIC: Awake, alert.    SKIN: Warm, dry, good turgor.      LABS:                        9.8    8.8   )-----------( 172      ( 02 Aug 2018 04:47 )             29.5     08-02    139  |  100  |  57<H>  ----------------------------<  177<H>  3.6   |  29  |  3.49<H>    Ca    9.2      02 Aug 2018 04:47  Phos  3.1     08-02  Mg     2.2     08-02          ABG - ( 02 Aug 2018 05:22 )  pH, Arterial: 7.40  pH, Blood: x     /  pCO2: 44    /  pO2: 150   / HCO3: 27    / Base Excess: 2.6   /  SaO2: >99             MICROBIOLOGY: (if applicable)    RADIOLOGY & ADDITIONAL STUDIES:  EKG:   CXR:  ECHO:    IMPRESSION: 85y Male PAST MEDICAL & SURGICAL HISTORY:  Stented coronary artery  Pacemaker  CKD (chronic kidney disease)  Hypertension  AICD (automatic cardioverter/defibrillator) present  CAD (coronary artery disease)  Kidney carcinoma: s/p right nephrectomy  Prostate cancer  Kidney carcinoma: s/p right nephrectomy  Hernia: Hernioplasty  Prostate: prostatectomy         RECOMMENDATIONS:    Patient is my office patient, he presents with SOB due to HF exacerbation. Pbnp noted to be 3,000. Patient previously had an ICD, which was removed due to infection. He subsequently had PPM inserted into R ACW as his EF recovered. Admit to tele, 2D echo, stress test. Patient was initially scheduled for ST as an out patient.    Patient had emergent bronchoscopy 8/2/18 for possible foreign body aspiration, remained in ICU for monitoring post procedure. He self extubated 8/3/18.    Start lasix 40mg PO.

## 2018-08-02 NOTE — PROGRESS NOTE ADULT - ASSESSMENT
A/P:  1.CKD: stage 4 ,r/o secondary to htn plus r/o FSGS post Rt .Nephrectomy induced  -pts egfr is mildly below his baseline ,r/o secondary to chf  -Keep patient euvolemic and renal diet  -Avoid Nephrotoxic Meds/ Agents such as (NSAIDs, IV contrast, Aminoglycosides such as gentamicin, -Gadolinium contrast, Phosphate containing enemas, etc..)  -Adjust Medications according to eGFR  -f/u bmp daily  -f/u urine studies  2.HTN: bp is controlled  -keep bp>110/70 and <140/80  3.EDEMA: mild  -continue  Lasix 60 mg daily  -continue  fluid restriction to 1 liter per day  4.Alkalosis: secondary to diuretics, r/o primary Resp.acidosis  -suggest ABG to check ph  -f/u co2 daily  5.ANEMIA: mild  -f/u Hb daily  -add epogen if Hb <10 gm A/P:  1.CKD: stage 4 ,r/o secondary to htn plus r/o FSGS post Rt .Nephrectomy induced  -pts renal function is worsening last 24 hrs , r/o secondary to post bronchoscopy/hemodynamic plus diuresis ?  -agree to reduce or hold Lasix x 24 to 48 hrs  -Keep patient euvolemic and renal diet  -Avoid Nephrotoxic Meds/ Agents such as (NSAIDs, IV contrast, Aminoglycosides such as gentamicin, -Gadolinium contrast, Phosphate containing enemas, etc..)  -Adjust Medications according to eGFR  -f/u bmp daily  2.HTN: bp is controlled  -keep bp>110/70 and <140/80  3.EDEMA: mild  -continue  fluid restriction to 1 liter per day  4.Alkalosis: secondary to diuretics , now improving   -f/u co2 daily  5.ANEMIA: mild  -f/u Hb daily  -add epogen if Hb <10 gm

## 2018-08-02 NOTE — CHART NOTE - NSCHARTNOTEFT_GEN_A_CORE
80 y/o male from home, ambulates independently with PMHX of CAD, AICD , Prostate CA, S/P surgery and RTX, Rt Nephrectomy for Rt Renal cell CA, Htn, HLD  Asthma, CKD , Bradycardia (s/p [employer] AICD), asthma, CKD, and COPD, Diverticulosis, PVD ( s/p balloon left leg stent) came with complain of dyspnea and blurry vision since 1 week. Being treated for CHF    ICU was consulted s/p bronchoscopy. Patient was being treated for CHF and on morning of 8/1/18, patient started to have slurring of speech. Patient was taken for urgent bronchoscopy for risk for foreign body aspiration. Bronchoscopy was done and no foreign body was found but vocal cord swelling was present. Patient was intubated and ICU was consulted. Patient self-extubated during the night and was placed on nasal cannula. He was given 3 doses of IV decadron 10mg. Lasix changed to 20mg daily as per cardiology.     # Respiratory failure.   Patient in impending respiratory failure due to vocal cord edema and aspiration of foreign body  S/p bronchoscopy and intubation; Patient self-extubated on 8/2/2018 while in ICU and was placed on nasal cannula  ENT recommends did laryngoscopy and mild vocal cord swelling was seen recommended to continue Iv decadron 10mg q8 hours for 3 doses    # CHF (congestive heart failure).   EF>55%  C/w metoprolol, statin  Cardio recommends change lasix to 20mg IVP and then to PO when possible.     # Hypertension.    C/w Clonidine 0.2mg TID, hydralazine 25 mg TID and metoprolol 25 daily  Lasix changed to 20mg as per cardio  Goal Bp <140/90.     # CAD (coronary artery disease).     Continue plavix and statin.     # Prophylactic measure.    Heparin sq and protonix.

## 2018-08-02 NOTE — PROGRESS NOTE ADULT - SUBJECTIVE AND OBJECTIVE BOX
INTERVAL HPI/OVERNIGHT EVENTS: Patient self-extubated at 4am, placed on nasal cannula. Patient assessed bedside.    PRESSORS: [ ] YES [ ] NO  WHICH:    ANTIBIOTICS:                  DATE STARTED:  ANTIBIOTICS:                  DATE STARTED:  ANTIBIOTICS:                  DATE STARTED:    Antimicrobial:    Cardiovascular:  cloNIDine 0.2 milliGRAM(s) Oral three times a day  furosemide   Injectable 60 milliGRAM(s) IV Push daily  hydrALAZINE 25 milliGRAM(s) Oral every 8 hours  metoprolol succinate ER 50 milliGRAM(s) Oral daily    Pulmonary:  ALBUTerol/ipratropium for Nebulization 3 milliLiter(s) Nebulizer every 6 hours    Hematalogic:  clopidogrel Tablet 75 milliGRAM(s) Oral daily  heparin  Injectable 5000 Unit(s) SubCutaneous every 8 hours    Other:  dexamethasone  Injectable 10 milliGRAM(s) IV Push every 8 hours  docusate sodium 100 milliGRAM(s) Oral three times a day  insulin lispro (HumaLOG) corrective regimen sliding scale   SubCutaneous every 6 hours  neomycin/BACItracin/polymyxin Ointment 1 Application(s) Right EYE every 6 hours  simvastatin 10 milliGRAM(s) Oral at bedtime    ALBUTerol/ipratropium for Nebulization 3 milliLiter(s) Nebulizer every 6 hours  cloNIDine 0.2 milliGRAM(s) Oral three times a day  clopidogrel Tablet 75 milliGRAM(s) Oral daily  dexamethasone  Injectable 10 milliGRAM(s) IV Push every 8 hours  docusate sodium 100 milliGRAM(s) Oral three times a day  furosemide   Injectable 60 milliGRAM(s) IV Push daily  heparin  Injectable 5000 Unit(s) SubCutaneous every 8 hours  hydrALAZINE 25 milliGRAM(s) Oral every 8 hours  insulin lispro (HumaLOG) corrective regimen sliding scale   SubCutaneous every 6 hours  metoprolol succinate ER 50 milliGRAM(s) Oral daily  neomycin/BACItracin/polymyxin Ointment 1 Application(s) Right EYE every 6 hours  simvastatin 10 milliGRAM(s) Oral at bedtime    Drug Dosing Weight  Height (cm): 162.56 (29 Jul 2018 17:03)  Weight (kg): 66 (31 Jul 2018 17:05)  BMI (kg/m2): 25 (31 Jul 2018 17:05)  BSA (m2): 1.71 (31 Jul 2018 17:05)    CENTRAL LINE: [ ] YES [x ] NO  LOCATION:         [facility]: [x ] YES [ ] NO          A-LINE:  [ ] YES [ x] NO  LOCATION:             ICU Vital Signs Last 24 Hrs  T(C): 36.6 (02 Aug 2018 06:06), Max: 38.3 (02 Aug 2018 00:08)  T(F): 97.9 (02 Aug 2018 06:06), Max: 100.9 (02 Aug 2018 00:08)  HR: 76 (02 Aug 2018 07:00) (59 - 82)  BP: 102/57 (02 Aug 2018 07:00) (82/35 - 157/53)  BP(mean): 67 (02 Aug 2018 07:00) (45 - 92)  ABP: --  ABP(mean): --  RR: 22 (02 Aug 2018 07:00) (12 - 31)  SpO2: 100% (02 Aug 2018 07:00) (85% - 100%)      ABG - ( 02 Aug 2018 05:22 )  pH, Arterial: 7.40  pH, Blood: x     /  pCO2: 44    /  pO2: 150   / HCO3: 27    / Base Excess: 2.6   /  SaO2: >99                   08-01 @ 07:01  -  08-02 @ 07:00  --------------------------------------------------------  IN: 494.5 mL / OUT: 90 mL / NET: 404.5 mL        Mode: standby      REVIEW OF SYSTEMS:    CONSTITUTIONAL: No weakness, fevers or chills  NECK: No pain or stiffness  RESPIRATORY: Cough with little amount of colorless production, wheezing, hemoptysis; No shortness of breath  CARDIOVASCULAR: No chest pain or palpitations  GASTROINTESTINAL: No abdominal or epigastric pain.  GENITOURINARY: No dysuria  NEUROLOGICAL: No numbness or weakness  All other review of systems is negative unless indicated above      PHYSICAL EXAM:    GENERAL: NAD, well-groomed, well-developed  EYES: EOMI, PERRLA,   NECK: Supple, No JVD; Normal thyroid; Trachea midline  NERVOUS SYSTEM:  Alert & Oriented X3,  Motor Strength 5/5 B/L upper and lower extremities; DTRs 2+ intact and symmetric  CHEST/LUNG: left lower lobe coarse breath sounds  HEART: Regular rate and rhythm; No murmurs,   ABDOMEN: Soft, Nontender, Nondistended; Bowel sounds present  EXTREMITIES:  2+ Peripheral Pulses, No clubbing, cyanosis, or edema        LABS:  CBC Full  -  ( 02 Aug 2018 04:47 )  WBC Count : 8.8 K/uL  Hemoglobin : 9.8 g/dL  Hematocrit : 29.5 %  Platelet Count - Automated : 172 K/uL  Mean Cell Volume : 90.0 fl  Mean Cell Hemoglobin : 29.8 pg  Mean Cell Hemoglobin Concentration : 33.2 gm/dL  Auto Neutrophil # : x  Auto Lymphocyte # : x  Auto Monocyte # : x  Auto Eosinophil # : x  Auto Basophil # : x  Auto Neutrophil % : x  Auto Lymphocyte % : x  Auto Monocyte % : x  Auto Eosinophil % : x  Auto Basophil % : x    08-02    139  |  100  |  57<H>  ----------------------------<  177<H>  3.6   |  29  |  3.49<H>    Ca    9.2      02 Aug 2018 04:47  Phos  3.1     08-02  Mg     2.2     08-02              RADIOLOGY & ADDITIONAL STUDIES REVIEWED:  ***    [ ]GOALS OF CARE DISCUSSION WITH PATIENT/FAMILY/PROXY:    CRITICAL CARE TIME SPENT: 35 minutes INTERVAL HPI/OVERNIGHT EVENTS: Patient self-extubated at 4am, placed on nasal cannula. Patient assessed bedside.    PRESSORS: [ ] YES [x] NO  WHICH:    ANTIBIOTICS:                  DATE STARTED:  ANTIBIOTICS:                  DATE STARTED:  ANTIBIOTICS:                  DATE STARTED:    Antimicrobial:    Cardiovascular:  cloNIDine 0.2 milliGRAM(s) Oral three times a day  furosemide   Injectable 60 milliGRAM(s) IV Push daily  hydrALAZINE 25 milliGRAM(s) Oral every 8 hours  metoprolol succinate ER 50 milliGRAM(s) Oral daily    Pulmonary:  ALBUTerol/ipratropium for Nebulization 3 milliLiter(s) Nebulizer every 6 hours    Hematalogic:  clopidogrel Tablet 75 milliGRAM(s) Oral daily  heparin  Injectable 5000 Unit(s) SubCutaneous every 8 hours    Other:  dexamethasone  Injectable 10 milliGRAM(s) IV Push every 8 hours  docusate sodium 100 milliGRAM(s) Oral three times a day  insulin lispro (HumaLOG) corrective regimen sliding scale   SubCutaneous every 6 hours  neomycin/BACItracin/polymyxin Ointment 1 Application(s) Right EYE every 6 hours  simvastatin 10 milliGRAM(s) Oral at bedtime    ALBUTerol/ipratropium for Nebulization 3 milliLiter(s) Nebulizer every 6 hours  cloNIDine 0.2 milliGRAM(s) Oral three times a day  clopidogrel Tablet 75 milliGRAM(s) Oral daily  dexamethasone  Injectable 10 milliGRAM(s) IV Push every 8 hours  docusate sodium 100 milliGRAM(s) Oral three times a day  furosemide   Injectable 60 milliGRAM(s) IV Push daily  heparin  Injectable 5000 Unit(s) SubCutaneous every 8 hours  hydrALAZINE 25 milliGRAM(s) Oral every 8 hours  insulin lispro (HumaLOG) corrective regimen sliding scale   SubCutaneous every 6 hours  metoprolol succinate ER 50 milliGRAM(s) Oral daily  neomycin/BACItracin/polymyxin Ointment 1 Application(s) Right EYE every 6 hours  simvastatin 10 milliGRAM(s) Oral at bedtime    Drug Dosing Weight  Height (cm): 162.56 (29 Jul 2018 17:03)  Weight (kg): 66 (31 Jul 2018 17:05)  BMI (kg/m2): 25 (31 Jul 2018 17:05)  BSA (m2): 1.71 (31 Jul 2018 17:05)    CENTRAL LINE: [ ] YES [x ] NO  LOCATION:         [facility]: [x ] YES [ ] NO          A-LINE:  [ ] YES [ x] NO  LOCATION:             ICU Vital Signs Last 24 Hrs  T(C): 36.6 (02 Aug 2018 06:06), Max: 38.3 (02 Aug 2018 00:08)  T(F): 97.9 (02 Aug 2018 06:06), Max: 100.9 (02 Aug 2018 00:08)  HR: 76 (02 Aug 2018 07:00) (59 - 82)  BP: 102/57 (02 Aug 2018 07:00) (82/35 - 157/53)  BP(mean): 67 (02 Aug 2018 07:00) (45 - 92)  ABP: --  ABP(mean): --  RR: 22 (02 Aug 2018 07:00) (12 - 31)  SpO2: 100% (02 Aug 2018 07:00) (85% - 100%)      ABG - ( 02 Aug 2018 05:22 )  pH, Arterial: 7.40  pH, Blood: x     /  pCO2: 44    /  pO2: 150   / HCO3: 27    / Base Excess: 2.6   /  SaO2: >99                   08-01 @ 07:01  -  08-02 @ 07:00  --------------------------------------------------------  IN: 494.5 mL / OUT: 90 mL / NET: 404.5 mL        Mode: standby      REVIEW OF SYSTEMS:    CONSTITUTIONAL: No weakness, fevers or chills  NECK: No pain or stiffness  RESPIRATORY: Cough with little amount of colorless production, wheezing, hemoptysis; No shortness of breath  CARDIOVASCULAR: No chest pain or palpitations  GASTROINTESTINAL: No abdominal or epigastric pain.  GENITOURINARY: No dysuria  NEUROLOGICAL: No numbness or weakness  All other review of systems is negative unless indicated above      PHYSICAL EXAM:    GENERAL: NAD, well-groomed, well-developed  EYES: EOMI, PERRLA,   NECK: Supple, No JVD; Normal thyroid; Trachea midline  NERVOUS SYSTEM:  Alert & Oriented X3,  Motor Strength 5/5 B/L upper and lower extremities  CHEST/LUNG: left lower lobe coarse breath sounds  HEART: Regular rate and rhythm; No murmurs,   ABDOMEN: Soft, Nontender, Nondistended; Bowel sounds present  EXTREMITIES:  2+ Peripheral Pulses, No clubbing, cyanosis, or edema        LABS:  CBC Full  -  ( 02 Aug 2018 04:47 )  WBC Count : 8.8 K/uL  Hemoglobin : 9.8 g/dL  Hematocrit : 29.5 %  Platelet Count - Automated : 172 K/uL  Mean Cell Volume : 90.0 fl  Mean Cell Hemoglobin : 29.8 pg  Mean Cell Hemoglobin Concentration : 33.2 gm/dL  Auto Neutrophil # : x  Auto Lymphocyte # : x  Auto Monocyte # : x  Auto Eosinophil # : x  Auto Basophil # : x  Auto Neutrophil % : x  Auto Lymphocyte % : x  Auto Monocyte % : x  Auto Eosinophil % : x  Auto Basophil % : x    08-02    139  |  100  |  57<H>  ----------------------------<  177<H>  3.6   |  29  |  3.49<H>    Ca    9.2      02 Aug 2018 04:47  Phos  3.1     08-02  Mg     2.2     08-02              RADIOLOGY & ADDITIONAL STUDIES REVIEWED:  EXAM:  XR CHEST PORTABLE ROUTINE 1V                          FINDINGS:   Right-sided AICD in situ.  The heart is not enlarged.  No acute congestive changes seen.  Interval improvement in left basal airspace haziness.  Trace left pleural effusion has developed.  The right lung is clear.  No pneumothorax.  There are degenerative changes of the thoracic spine.    IMPRESSION:   Trace left pleural effusion now seen.  Interval improvement in left basal haziness.  Right lung is clear.      [ ]GOALS OF CARE DISCUSSION WITH PATIENT/FAMILY/PROXY:    CRITICAL CARE TIME SPENT: 35 minutes

## 2018-08-02 NOTE — PROGRESS NOTE ADULT - SUBJECTIVE AND OBJECTIVE BOX
pt seen and examined.pts current chart reviewed and case discussed with resident covering.    SUBJECTIVE:  pt feels fine and denies cp,sob,gi or gu/uremic  symptons    REVIEW OF SYSTEMS:  CONSTITUTIONAL: No weakness, fevers or chills  EYES/ENT: No visual changes;  No vertigo or throat pain   NECK: No pain or stiffness  RESPIRATORY: No cough, wheezing, hemoptysis; No shortness of breath  CARDIOVASCULAR: No chest pain or palpitations  GASTROINTESTINAL: No abdominal or epigastric pain. No nausea, vomiting, or hematemesis; No diarrhea or constipation. No melena or hematochezia.  GENITOURINARY: No dysuria, frequency , hematuria, flank pain or nocturia  NEUROLOGICAL: No numbness or weakness  SKIN: No itching, burning, rashes, or lesions   All other review of systems is negative unless indicated above    Current meds:    ALBUTerol/ipratropium for Nebulization 3 milliLiter(s) Nebulizer every 6 hours  cloNIDine 0.2 milliGRAM(s) Oral three times a day  clopidogrel Tablet 75 milliGRAM(s) Oral daily  dexamethasone  Injectable 10 milliGRAM(s) IV Push every 8 hours  docusate sodium 100 milliGRAM(s) Oral three times a day  furosemide   Injectable 20 milliGRAM(s) IV Push daily  heparin  Injectable 5000 Unit(s) SubCutaneous every 8 hours  hydrALAZINE 25 milliGRAM(s) Oral every 8 hours  insulin lispro (HumaLOG) corrective regimen sliding scale   SubCutaneous every 6 hours  metoprolol succinate ER 50 milliGRAM(s) Oral daily  neomycin/BACItracin/polymyxin Ointment 1 Application(s) Right EYE every 6 hours  simvastatin 10 milliGRAM(s) Oral at bedtime      Vital Signs    T(F): 98 (18 @ 16:23), Max: 100.9 (18 @ 00:08)  HR: 71 (18 @ 15:00) (60 - 82)  BP: 123/40 (18 @ 15:00) (82/35 - 157/53)  ABP: --  RR: 20 (18 @ 15:00) (12 - 31)  SpO2: 100% (18 @ 15:00) (85% - 100%)  Wt(kg): --  CVP(cm H2O): --  CO: --  PCWP: --    I and O's:     @ 07:  -   @ 07:00  --------------------------------------------------------  IN:    dexmedetomidine Infusion: 5 mL    Oral Fluid: 300 mL    propofol Infusion: 39.5 mL    Solution: 50 mL    Solution: 100 mL  Total IN: 494.5 mL    OUT:    Indwelling Catheter - Urethral: 90 mL  Total OUT: 90 mL    Total NET: 404.5 mL       @ 07:  -   @ 17:28  --------------------------------------------------------  IN:  Total IN: 0 mL    OUT:    Indwelling Catheter - Urethral: 70 mL  Total OUT: 70 mL    Total NET: -70 mL        Daily     Daily Weight in k.9 (02 Aug 2018 10:39)    PHYSICAL EXAM:  Constitutional: well developed, well nourished  and in nad  HEENT: PERRLA,  no icteric sclera and mild pallor of conjunctiva noted  Neck: No JVD, thyromegaly or adenopathy  Respiratory: reduced air entry lower lungs with no rales, wheezing or rhonchi  Cardiovascular: S1 and S2 normally heard  Gastrointestinal: soft, nondistended, nontender and normal bowel sounds heard  Extremities: No peripheral edema or cyanosis  Neurological: A/O x 3, no focal deficits  : No flank or cva tenderness palpated.  Skin: No rashes      LABS:    CBC:                          9.8    8.8   )-----------( 172      ( 02 Aug 2018 04:47 )             29.5           BMP:        139  |  100  |  57<H>  ----------------------------<  177<H>  3.6   |  29  |  3.49<H>      140  |  100  |  48<H>  ----------------------------<  192<H>  3.9   |  33<H>  |  2.49<H>      143  |  104  |  49<H>  ----------------------------<  131<H>  3.9   |  33<H>  |  2.34<H>    Ca    9.2      02 Aug 2018 04:47  Ca    9.7      01 Aug 2018 09:37  Ca    9.8      2018 06:32  Phos  3.1       Mg     2.2                 URINE STUDIES:        Creatinine, Random Urine: 124 mg/dL ( @ 07:31)  Osmolality, Random Urine: 331 mos/kg ( @ 07:29)  Sodium, Random Urine: 33 mmol/L ( @ 07:29)  Chloride, Random Urine: 21 mmol/L ( @ 07:29)  Creatinine, Random Urine: 29 mg/dL ( @ 16:33)  Sodium, Random Urine: 107 mmol/L ( @ 16:33)  Potassium, Random Urine: 17 mmol/L ( @ 16:33)                  RADIOLOGY & ADDITIONAL STUDIES: pt seen and examined.    SUBJECTIVE:  pt seen in micu  pt feels fine and denies cp,sob,gi or gu  symptons  pt with s/p self extubation last night    REVIEW OF SYSTEMS:  CONSTITUTIONAL: No weakness, fevers or chills  EYES/ENT: No visual changes;  No vertigo or throat pain   NECK: No pain or stiffness  RESPIRATORY: No cough, wheezing, hemoptysis; no shortness of breath  CARDIOVASCULAR: No chest pain or palpitations  GASTROINTESTINAL: No abdominal or epigastric pain. No nausea, vomiting, or hematemesis; No diarrhea or constipation. No melena or hematochezia.  GENITOURINARY: No dysuria, frequency , hematuria, flank pain or nocturia  NEUROLOGICAL: No numbness or weakness  SKIN: No itching, burning, rashes, or lesions   All other review of systems is negative unless indicated above    Current meds:    ALBUTerol/ipratropium for Nebulization 3 milliLiter(s) Nebulizer every 6 hours  cloNIDine 0.2 milliGRAM(s) Oral three times a day  clopidogrel Tablet 75 milliGRAM(s) Oral daily  dexamethasone  Injectable 10 milliGRAM(s) IV Push every 8 hours  docusate sodium 100 milliGRAM(s) Oral three times a day  furosemide   Injectable 20 milliGRAM(s) IV Push daily  heparin  Injectable 5000 Unit(s) SubCutaneous every 8 hours  hydrALAZINE 25 milliGRAM(s) Oral every 8 hours  insulin lispro (HumaLOG) corrective regimen sliding scale   SubCutaneous every 6 hours  metoprolol succinate ER 50 milliGRAM(s) Oral daily  neomycin/BACItracin/polymyxin Ointment 1 Application(s) Right EYE every 6 hours  simvastatin 10 milliGRAM(s) Oral at bedtime      Vital Signs    T(F): 98 (18 @ 16:23), Max: 100.9 (18 @ 00:08)  HR: 71 (18 @ 15:00) (60 - 82)  BP: 123/40 (18 @ 15:00) (82/35 - 157/53)  ABP: --  RR: 20 (18 @ 15:00) (12 - 31)  SpO2: 100% (18 @ 15:00) (85% - 100%)  Wt(kg): --  CVP(cm H2O): --  CO: --  PCWP: --    I and O's:     @ 07:01  -   @ 07:00  --------------------------------------------------------  IN:    dexmedetomidine Infusion: 5 mL    Oral Fluid: 300 mL    propofol Infusion: 39.5 mL    Solution: 50 mL    Solution: 100 mL  Total IN: 494.5 mL    OUT:    Indwelling Catheter - Urethral: 90 mL  Total OUT: 90 mL    Total NET: 404.5 mL       @ 07:  -   @ 17:28  --------------------------------------------------------  IN:  Total IN: 0 mL    OUT:    Indwelling Catheter - Urethral: 70 mL  Total OUT: 70 mL    Total NET: -70 mL        Daily     Daily Weight in k.9 (02 Aug 2018 10:39)    PHYSICAL EXAM:  Constitutional: well developed, well nourished  and in nad  HEENT: PERRLA,  no icteric sclera and mild pallor of conjunctiva noted  Neck: No JVD, thyromegaly or adenopathy  Respiratory: reduced air entry lower lungs with no rales, whezzing or rhonchi  Cardiovascular: S1 and S2 normally heard  Gastrointestinal: soft, nondistended, nontender and normal bowel sounds heard  Extremities: mild peripheral edema noted in both LE s , no  cyanosis  Neurological: A/O x 3, no focal deficits  Psychiatric: Normal mood, normal affect  : No flank tenderness  Skin: No rashes    LABS:    CBC:                          9.8    8.8   )-----------( 172      ( 02 Aug 2018 04:47 )             29.5       BMP:        139  |  100  |  57<H>  ----------------------------<  177<H>  3.6   |  29  |  3.49<H>      140  |  100  |  48<H>  ----------------------------<  192<H>  3.9   |  33<H>  |  2.49<H>      143  |  104  |  49<H>  ----------------------------<  131<H>  3.9   |  33<H>  |  2.34<H>    Ca    9.2      02 Aug 2018 04:47  Ca    9.7      01 Aug 2018 09:37  Ca    9.8      2018 06:32  Phos  3.1       Mg     2.2         Blood Gas Profile - Arterial (18 @ 05:22)    pH, Arterial: 7.40    pCO2, Arterial: 44 mmHg    pO2, Arterial: 150 mmHg    HCO3, Arterial: 27 mmol/L    Base Excess, Arterial: 2.6 mmol/L    Oxygen Saturation, Arterial: >99 %    FIO2, Arterial: 36.0    Blood Gas Comments Arterial: POST SELF EXTUBATION 4 L/M NC RR            URINE STUDIES:        Creatinine, Random Urine: 124 mg/dL ( @ 07:31)  Osmolality, Random Urine: 331 mos/kg ( @ 07:29)  Sodium, Random Urine: 33 mmol/L ( @ 07:29)  Chloride, Random Urine: 21 mmol/L ( @ 07:29)  Creatinine, Random Urine: 29 mg/dL ( @ 16:33)  Sodium, Random Urine: 107 mmol/L ( @ 16:33)  Potassium, Random Urine: 17 mmol/L ( @ 16:33)                  RADIOLOGY & ADDITIONAL STUDIES:    < from: Xray Chest 1 View- PORTABLE-Routine (18 @ 07:20) >  EXAM:  XR CHEST PORTABLE ROUTINE 1V                            PROCEDURE DATE:  2018          INTERPRETATION:    DATE OF STUDY: 18.    PRIOR:18.    CLINICAL INDICATION: 85-yo-male with CHF.    TECHNIQUE: portable chest.    FINDINGS:   Right-sided AICD in situ.  The heart is not enlarged.  No acute congestive changes seen.  Interval improvement in left basal airspace haziness.  Trace left pleural effusion has developed.  The right lung is clear.  No pneumothorax.  There are degenerative changes of the thoracic spine.    IMPRESSION:   Trace left pleural effusion now seen.  Interval improvement in left basal haziness.  Right lung is clear.      < end of copied text >

## 2018-08-02 NOTE — AIRWAY REMOVAL NOTE  ADULT & PEDS - ARTIFICAL AIRWAY REMOVAL COMMENTS
Patient placed on 4 l/m nasal cannula following self extubation. No respiratory distress noted. Post extubation ABG was done and MD aware of results.

## 2018-08-02 NOTE — PROGRESS NOTE ADULT - PROBLEM SELECTOR PLAN 2
EF>55%  C/w metoprolol, statin  Cardio recommends change lasix to 20mg IVP and then to PO when possible.

## 2018-08-02 NOTE — DIETITIAN INITIAL EVALUATION ADULT. - PERTINENT LABORATORY DATA
08-02 Na139 mmol/L Glu 177 mg/dL<H> K+ 3.6 mmol/L Cr  3.49 mg/dL<H> BUN 57 mg/dL<H> 08-02 Phos 3.1 mg/dL 07-30 Alb 3.5 g/dL 07-30 [id number] 5.0 % 07-30 Chol 116 mg/dL LDL 45 mg/dL HDL 41 mg/dL Trig 149 mg/dL

## 2018-08-02 NOTE — CHART NOTE - NSCHARTNOTEFT_GEN_A_CORE
Patient self-extubated at approximately 4 AM. Patient alert and oriented, in no respiratory distress. Physical exam reveals no wheezing, no stridor, O2 sat 94-59% with RR 22-25 breaths/min. Will continue to monitor respiratory status for now. Meron ordered. Dr. Zamroa aware.

## 2018-08-02 NOTE — CONSULT NOTE ADULT - SUBJECTIVE AND OBJECTIVE BOX
HPI: 80 y/o male from home, ambulates independently with PMHX of CAD, + Stent placed in 2009 on Plavix QOD, AICD placed in 2011 (s/p removal), CHF s/p PPMProstate CA, S/P surgery and RTX, Rt Nephrectomy for Rt Renal cell CA, Htn, HLD  Asthma stable, CKD ( baseline creatinine is 2.0) ,, asthma, CKD, and COPD (not on home O2) S/P Colonoscopy in March 2015 c/w Diverticulosis , HX of Polyps removed, PVD ( s/p balloon left leg stent) came with complain of dyspnea and blurry vision since 1 week. Patient states he is developing worsening shortness of breath since 1 week to an extent that he is unable to walk from room to kitchen, on exertion and on talking. He also developed blurry vision in Rt eye since 1 week with purulent discharge which progressively got worse since 1 week. He denies chest pain, fever, chills, nausea, vomiting, diarrhea, constipation or any other complains. Patient was then bronched by pulmonology who observed possible vocal cord edema. Patient was then kept intubated post procedure.    PAST MEDICAL & SURGICAL HISTORY:  Stented coronary artery  Pacemaker  CKD (chronic kidney disease)  Hypertension  AICD (automatic cardioverter/defibrillator) present  CAD (coronary artery disease)  Kidney carcinoma: s/p right nephrectomy  Prostate cancer  Kidney carcinoma: s/p right nephrectomy  Hernia: Hernioplasty  Prostate: prostatectomy    Allergies  aspirin (Anaphylaxis)    SOCIAL HISTORY/FAMILY HISTORY reviewed:     Medications:  cloNIDine 0.2 milliGRAM(s) Oral three times a day  clopidogrel Tablet 75 milliGRAM(s) Oral daily  docusate sodium 100 milliGRAM(s) Oral three times a day  furosemide   Injectable 60 milliGRAM(s) IV Push daily  heparin  Injectable 5000 Unit(s) SubCutaneous every 8 hours  hydrALAZINE 25 milliGRAM(s) Oral every 8 hours  metoprolol succinate ER 50 milliGRAM(s) Oral daily  neomycin/BACItracin/polymyxin Ointment 1 Application(s) Right EYE every 6 hours  simvastatin 10 milliGRAM(s) Oral at bedtime    LABS:                        10.5   8.7   )-----------( 224      ( 01 Aug 2018 09:37 )             32.0     08-01    140  |  100  |  48<H>  ----------------------------<  192<H>  3.9   |  33<H>  |  2.49<H>    Ca    9.7      01 Aug 2018 09:37        PHYSICAL EXAM:  Vital Signs Last 24 Hrs  T(C): 38.3 (02 Aug 2018 00:08), Max: 38.3 (02 Aug 2018 00:08)  T(F): 100.9 (02 Aug 2018 00:08), Max: 100.9 (02 Aug 2018 00:08)  HR: 67 (01 Aug 2018 23:00) (59 - 82)  BP: 131/82 (01 Aug 2018 23:00) (82/35 - 158/65)  BP(mean): 92 (01 Aug 2018 23:00) (46 - 92)  RR: 19 (01 Aug 2018 23:00) (12 - 27)  SpO2: 100% (01 Aug 2018 23:00) (93% - 100%)  GENERAL: NAD, well-groomed, well-developed  EYES: PERRL   NECK: Supple, No JVD; Normal thyroid; Trachea midline  NERVOUS SYSTEM:  intubated and sedated  CHEST/LUNG: B/l rales, No rhonchi, wheezing   HEART: Regular rate and rhythm; No murmurs,   ABDOMEN: Soft, Nontender, Nondistended; Bowel sounds present  EXTREMITIES:  2+ Peripheral Pulses, No clubbing, cyanosis, or edema    Flexible Fiberoptic Laryngoscopy: clear nasopharynx to glottis, ETT visualized passing through glottis, TVC with mild edema and erythema.       Assessment and Recommendation:   80 y/o male from s/p bronchoscopy with ?vocal cord edema. Laryngoscopy demonstrated mild vocal cord edema.  airway otherwise widely patent.    -decadron 10mg IV q8hrs x 3 doses  -ok to wean to extubate from ENT standpoint after steroids

## 2018-08-02 NOTE — PROGRESS NOTE ADULT - ASSESSMENT
82 y/o male from home, ambulates independently with PMHX of CAD, + Stent placed in 2009 on Plavix QOD, AICD placed in 2011, Prostate CA, S/P surgery and RTX, Rt Nephrectomy for Rt Renal cell CA, Htn, HLD  Asthma stable, CKD ( baseline creatinine is 2.0) , Bradycardia (s/p [employer]onic AICD), asthma, CKD, and COPD (not on home O2) S/P Colonoscopy in March 2015 c/w Diverticulosis , HX of Polyps removed, PVD ( s/p balloon left leg stent) came with complain of dyspnea and blurry vision since 1 week.    ICU was consulted because of vocal cord swelling seen on bronchoscopy. 82 y/o male from home, ambulates independently with PMHX of CAD, AICD , Prostate CA, S/P surgery and RTX, Rt Nephrectomy for Rt Renal cell CA, Htn, HLD  Asthma, CKD , Bradycardia (s/p [employer]onic AICD), asthma, CKD, and COPD, Diverticulosis, PVD ( s/p balloon left leg stent) came with complain of dyspnea and blurry vision since 1 week. Being treated for CHF    ICU was consulted because of vocal cord swelling seen on bronchoscopy.

## 2018-08-03 DIAGNOSIS — R05 COUGH: ICD-10-CM

## 2018-08-03 LAB
ANION GAP SERPL CALC-SCNC: 8 MMOL/L — SIGNIFICANT CHANGE UP (ref 5–17)
BUN SERPL-MCNC: 73 MG/DL — HIGH (ref 7–18)
CALCIUM SERPL-MCNC: 8.9 MG/DL — SIGNIFICANT CHANGE UP (ref 8.4–10.5)
CHLORIDE SERPL-SCNC: 98 MMOL/L — SIGNIFICANT CHANGE UP (ref 96–108)
CO2 SERPL-SCNC: 29 MMOL/L — SIGNIFICANT CHANGE UP (ref 22–31)
CREAT SERPL-MCNC: 3.9 MG/DL — HIGH (ref 0.5–1.3)
GLUCOSE BLDC GLUCOMTR-MCNC: 133 MG/DL — HIGH (ref 70–99)
GLUCOSE BLDC GLUCOMTR-MCNC: 151 MG/DL — HIGH (ref 70–99)
GLUCOSE BLDC GLUCOMTR-MCNC: 196 MG/DL — HIGH (ref 70–99)
GLUCOSE BLDC GLUCOMTR-MCNC: 221 MG/DL — HIGH (ref 70–99)
GLUCOSE BLDC GLUCOMTR-MCNC: 264 MG/DL — HIGH (ref 70–99)
GLUCOSE SERPL-MCNC: 176 MG/DL — HIGH (ref 70–99)
HCT VFR BLD CALC: 27.4 % — LOW (ref 39–50)
HGB BLD-MCNC: 9.2 G/DL — LOW (ref 13–17)
MAGNESIUM SERPL-MCNC: 2.5 MG/DL — SIGNIFICANT CHANGE UP (ref 1.6–2.6)
MCHC RBC-ENTMCNC: 30.2 PG — SIGNIFICANT CHANGE UP (ref 27–34)
MCHC RBC-ENTMCNC: 33.6 GM/DL — SIGNIFICANT CHANGE UP (ref 32–36)
MCV RBC AUTO: 90 FL — SIGNIFICANT CHANGE UP (ref 80–100)
PHOSPHATE SERPL-MCNC: 4 MG/DL — SIGNIFICANT CHANGE UP (ref 2.5–4.5)
PLATELET # BLD AUTO: 172 K/UL — SIGNIFICANT CHANGE UP (ref 150–400)
POTASSIUM SERPL-MCNC: 4.3 MMOL/L — SIGNIFICANT CHANGE UP (ref 3.5–5.3)
POTASSIUM SERPL-SCNC: 4.3 MMOL/L — SIGNIFICANT CHANGE UP (ref 3.5–5.3)
RBC # BLD: 3.04 M/UL — LOW (ref 4.2–5.8)
RBC # FLD: 12.8 % — SIGNIFICANT CHANGE UP (ref 10.3–14.5)
SODIUM SERPL-SCNC: 135 MMOL/L — SIGNIFICANT CHANGE UP (ref 135–145)
WBC # BLD: 8.6 K/UL — SIGNIFICANT CHANGE UP (ref 3.8–10.5)
WBC # FLD AUTO: 8.6 K/UL — SIGNIFICANT CHANGE UP (ref 3.8–10.5)

## 2018-08-03 RX ADMIN — Medication 2: at 18:15

## 2018-08-03 RX ADMIN — Medication 1: at 23:31

## 2018-08-03 RX ADMIN — Medication 3 MILLILITER(S): at 14:47

## 2018-08-03 RX ADMIN — SIMVASTATIN 10 MILLIGRAM(S): 20 TABLET, FILM COATED ORAL at 21:43

## 2018-08-03 RX ADMIN — Medication 1 APPLICATION(S): at 13:31

## 2018-08-03 RX ADMIN — Medication 1 APPLICATION(S): at 00:19

## 2018-08-03 RX ADMIN — Medication 1 APPLICATION(S): at 06:21

## 2018-08-03 RX ADMIN — Medication 100 MILLIGRAM(S): at 00:46

## 2018-08-03 RX ADMIN — HEPARIN SODIUM 5000 UNIT(S): 5000 INJECTION INTRAVENOUS; SUBCUTANEOUS at 21:42

## 2018-08-03 RX ADMIN — Medication 3: at 00:20

## 2018-08-03 RX ADMIN — Medication 0.2 MILLIGRAM(S): at 06:21

## 2018-08-03 RX ADMIN — Medication 3 MILLILITER(S): at 21:01

## 2018-08-03 RX ADMIN — Medication 100 MILLIGRAM(S): at 16:13

## 2018-08-03 RX ADMIN — CLOPIDOGREL BISULFATE 75 MILLIGRAM(S): 75 TABLET, FILM COATED ORAL at 12:30

## 2018-08-03 RX ADMIN — Medication 1 APPLICATION(S): at 17:15

## 2018-08-03 RX ADMIN — HEPARIN SODIUM 5000 UNIT(S): 5000 INJECTION INTRAVENOUS; SUBCUTANEOUS at 06:21

## 2018-08-03 RX ADMIN — Medication 40 MILLIGRAM(S): at 06:21

## 2018-08-03 RX ADMIN — Medication 100 MILLIGRAM(S): at 21:42

## 2018-08-03 RX ADMIN — Medication 10 MILLIGRAM(S): at 00:19

## 2018-08-03 RX ADMIN — HEPARIN SODIUM 5000 UNIT(S): 5000 INJECTION INTRAVENOUS; SUBCUTANEOUS at 16:14

## 2018-08-03 RX ADMIN — Medication 3 MILLILITER(S): at 09:43

## 2018-08-03 RX ADMIN — Medication 25 MILLIGRAM(S): at 06:21

## 2018-08-03 RX ADMIN — Medication 1 APPLICATION(S): at 23:31

## 2018-08-03 RX ADMIN — Medication 100 MILLIGRAM(S): at 06:21

## 2018-08-03 RX ADMIN — Medication 50 MILLIGRAM(S): at 06:21

## 2018-08-03 RX ADMIN — Medication 1: at 06:36

## 2018-08-03 NOTE — PHYSICAL THERAPY INITIAL EVALUATION ADULT - DIAGNOSIS, PT EVAL
Pt. presents w/ minimal weakness, dec. balance and limited mobility
Impaired endurance, strength and balance with ambulation and ADLs.

## 2018-08-03 NOTE — PHYSICAL THERAPY INITIAL EVALUATION ADULT - CRITERIA FOR SKILLED THERAPEUTIC INTERVENTIONS
impairments found/functional limitations in following categories
risk reduction/prevention/impairments found/anticipated discharge recommendation/functional limitations in following categories

## 2018-08-03 NOTE — PROGRESS NOTE ADULT - ASSESSMENT
A/P:  1.CKD: stage 4 ,r/o secondary to htn plus r/o FSGS post Rt .Nephrectomy induced  -pts renal function is worsening  r/o secondary to post bronchoscopy/hemodynamic plus diuresis ?  -continue hold diuretics.   -Keep patient euvolemic and renal diet  -Avoid Nephrotoxic Meds/ Agents such as (NSAIDs, IV contrast, Aminoglycosides such as gentamicin, -Gadolinium contrast, Phosphate containing enemas, etc..)  -Adjust Medications according to eGFR  -f/u bmp daily  2.HTN: bp is controlled  -keep bp>110/70 and <140/80  3.EDEMA: mild  -continue  fluid restriction to 1 liter per day  4.Alkalosis: secondary to diuretics , now improving   -f/u co2 daily  5.ANEMIA: mild  -f/u Hb daily  -start epogen 4000 tiw.    d/w resident. A/P:  1.CKD: stage 4 ,r/o secondary to htn plus r/o FSGS post Rt .Nephrectomy induced  -pts renal function is worsening  r/o secondary to post bronchoscopy/hemodynamic plus diuresis ?  -continue hold diuretics.   -Keep patient euvolemic and renal diet  -Avoid Nephrotoxic Meds/ Agents such as (NSAIDs, IV contrast, Aminoglycosides such as gentamicin, -Gadolinium contrast, Phosphate containing enemas, etc..)  -Adjust Medications according to eGFR  -f/u bmp daily  2.HTN: bp is controlled  -keep bp>110/70 and <140/80  3.EDEMA: mild  -continue  fluid restriction to 1 liter per day  4.Alkalosis: secondary to diuretics , now improving   -f/u co2 daily  5.ANEMIA: mild  -f/u Hb daily and monitor closely. if hb < 9.0gm, then will give epogen.    d/w resident.

## 2018-08-03 NOTE — PROGRESS NOTE ADULT - PROBLEM SELECTOR PLAN 2
no foreign body found on bronch  patient downgraded from ICU this AM  monitor respiratory status robitussin ineffective  starting hycodan 5mL q6 PRN

## 2018-08-03 NOTE — PROGRESS NOTE ADULT - SUBJECTIVE AND OBJECTIVE BOX
Patient was seen and examined at bedside.    CC: pt is doing well.     Vital Signs Last 24 Hrs  T(C): 36.3 (03 Aug 2018 15:17), Max: 36.8 (03 Aug 2018 07:11)  T(F): 97.3 (03 Aug 2018 15:17), Max: 98.3 (03 Aug 2018 07:11)  HR: 65 (03 Aug 2018 15:17) (63 - 79)  BP: 119/34 (03 Aug 2018 15:17) (110/44 - 143/50)  BP(mean): --  RR: 17 (03 Aug 2018 15:17) (17 - 20)  SpO2: 97% (03 Aug 2018 15:17) (95% - 100%)    08-02 @ 07:01  -  08-03 @ 07:00  --------------------------------------------------------  IN: 0 mL / OUT: 490 mL / NET: -490 mL        PHYSICAL EXAM:  Constitutional: well developed, well nourished  and in nad  HEENT: PERRLA,  no icteric sclera and mild pallor of conjunctiva noted  Neck: No JVD,   Respiratory: reduced air entry lower lungs with no rales, wheezing or rhonchi  Cardiovascular: S1 and S2 normally heard  Gastrointestinal: soft, nondistended, nontender and normal bowel sounds heard  Extremities: mild peripheral edema noted in both LE s , no  cyanosis  Neurological: A/O x 3, no focal deficits  Skin: No rashes    MEDICATIONS:  ALBUTerol/ipratropium for Nebulization 3 milliLiter(s) Nebulizer every 6 hours  cloNIDine 0.2 milliGRAM(s) Oral three times a day  clopidogrel Tablet 75 milliGRAM(s) Oral daily  docusate sodium 100 milliGRAM(s) Oral three times a day  furosemide    Tablet 40 milliGRAM(s) Oral daily  guaiFENesin   Syrup  (Sugar-Free) 100 milliGRAM(s) Oral every 6 hours PRN  heparin  Injectable 5000 Unit(s) SubCutaneous every 8 hours  hydrALAZINE 25 milliGRAM(s) Oral every 8 hours  HYDROcodone/homatropine Syrup 5 milliLiter(s) Oral every 6 hours PRN  insulin lispro (HumaLOG) corrective regimen sliding scale   SubCutaneous every 6 hours  metoprolol succinate ER 50 milliGRAM(s) Oral daily  neomycin/BACItracin/polymyxin Ointment 1 Application(s) Right EYE every 6 hours  simvastatin 10 milliGRAM(s) Oral at bedtime      LABS:                        9.2    8.6   )-----------( 172      ( 03 Aug 2018 06:30 )             27.4     08-03    135  |  98  |  73<H>  ----------------------------<  176<H>  4.3   |  29  |  3.90<H>    Ca    8.9      03 Aug 2018 06:30  Phos  4.0     08-03  Mg     2.5     08-03          Magnesium, Serum: 2.5 mg/dL (08-03 @ 06:30)  Phosphorus Level, Serum: 4.0 mg/dL (08-03 @ 06:30)    Urine studies  Creatinine, Random Urine: 124 mg/dL (08-02 @ 07:31)  Osmolality, Random Urine: 331 mos/kg (08-02 @ 07:29)  Sodium, Random Urine: 33 mmol/L <L> (08-02 @ 07:29)  Chloride, Random Urine: 21 mmol/L <L> (08-02 @ 07:29)    PTH and Vit D:

## 2018-08-03 NOTE — PROGRESS NOTE ADULT - SUBJECTIVE AND OBJECTIVE BOX
Patient seen and examined.     MEDICATIONS  (STANDING):  ALBUTerol/ipratropium for Nebulization 3 milliLiter(s) Nebulizer every 6 hours  cloNIDine 0.2 milliGRAM(s) Oral three times a day  clopidogrel Tablet 75 milliGRAM(s) Oral daily  docusate sodium 100 milliGRAM(s) Oral three times a day  furosemide    Tablet 40 milliGRAM(s) Oral daily  heparin  Injectable 5000 Unit(s) SubCutaneous every 8 hours  hydrALAZINE 25 milliGRAM(s) Oral every 8 hours  insulin lispro (HumaLOG) corrective regimen sliding scale   SubCutaneous every 6 hours  metoprolol succinate ER 50 milliGRAM(s) Oral daily  neomycin/BACItracin/polymyxin Ointment 1 Application(s) Right EYE every 6 hours  simvastatin 10 milliGRAM(s) Oral at bedtime      MEDICATIONS  (PRN):  guaiFENesin   Syrup  (Sugar-Free) 100 milliGRAM(s) Oral every 6 hours PRN Cough  HYDROcodone/homatropine Syrup 5 milliLiter(s) Oral every 6 hours PRN Cough     Medications up to date at time of exam.    PHYSICAL EXAMINATION:  Patient has no new complaints.  GENERAL: The patient is a well-developed, well-nourished, in no apparent distress.     Vital Signs Last 24 Hrs  T(C): 36.7 (03 Aug 2018 11:21), Max: 36.8 (03 Aug 2018 07:11)  T(F): 98 (03 Aug 2018 11:21), Max: 98.3 (03 Aug 2018 07:11)  HR: 76 (03 Aug 2018 11:21) (63 - 79)  BP: 110/44 (03 Aug 2018 11:21) (110/44 - 146/59)  BP(mean): 52 (02 Aug 2018 18:00) (52 - 79)  RR: 18 (03 Aug 2018 11:21) (18 - 27)  SpO2: 100% (03 Aug 2018 11:21) (95% - 100%)   (if applicable)    Chest Tube (if applicable)    HEENT: Head is normocephalic and atraumatic.     NECK: Supple, no palpable adenopathy.    LUNGS: Clear to auscultation, no wheezing, rales, or rhonchi.    HEART: Regular rate and rhythm without murmur.    ABDOMEN: Soft, nontender, and nondistended.      EXTREMITIES: Without any cyanosis, clubbing, rash, lesions or edema.    NEUROLOGIC: Awake, alert.    SKIN: Warm, dry, good turgor.    LABS:                        9.2    8.6   )-----------( 172      ( 03 Aug 2018 06:30 )             27.4     08-03    135  |  98  |  73<H>  ----------------------------<  176<H>  4.3   |  29  |  3.90<H>    Ca    8.9      03 Aug 2018 06:30  Phos  4.0     08-03  Mg     2.5     08-03          ABG - ( 02 Aug 2018 05:22 )  pH, Arterial: 7.40  pH, Blood: x     /  pCO2: 44    /  pO2: 150   / HCO3: 27    / Base Excess: 2.6   /  SaO2: >99           MICROBIOLOGY: (if applicable)    RADIOLOGY & ADDITIONAL STUDIES:  EKG:   CXR:  ECHO:    IMPRESSION: 85y Male PAST MEDICAL & SURGICAL HISTORY:  Stented coronary artery  Pacemaker  CKD (chronic kidney disease)  Hypertension  AICD (automatic cardioverter/defibrillator) present  CAD (coronary artery disease)  Kidney carcinoma: s/p right nephrectomy  Prostate cancer  Kidney carcinoma: s/p right nephrectomy  Hernia: Hernioplasty  Prostate: prostatectomy       80 y/o male from home, ambulates independently with PMHX of CAD, + Stent placed in 2009 on Plavix QOD, AICD placed in 2011, Prostate CA, S/P surgery and RTX, Rt Nephrectomy for Rt Renal cell CA, Htn, HLD  Asthma stable, CKD ( baseline creatinine is 2.0) , Bradycardia (s/p [employer] AICD), asthma, CKD, and COPD (not on home O2) S/P Colonoscopy in March 2015 c/w Diverticulosis , HX of Polyps removed, PVD ( s/p balloon left leg stent) came with complain of dyspnea and blurry vision since 1 week. Patient states he is developing worsening shortness of breath since 1 week to an extent that he is unable to walk from room to kitchen, on exertion and on talking. He also developed blurry vision in Rt eye since 1 week with purulent discharge which progressively got worse since 1 week. He denies chest pain, fever, chills, nausea, vomiting, diarrhea, constipation or any other complains.     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 Patient seen and examined.     MEDICATIONS  (STANDING):  ALBUTerol/ipratropium for Nebulization 3 milliLiter(s) Nebulizer every 6 hours  cloNIDine 0.2 milliGRAM(s) Oral three times a day  clopidogrel Tablet 75 milliGRAM(s) Oral daily  docusate sodium 100 milliGRAM(s) Oral three times a day  furosemide    Tablet 40 milliGRAM(s) Oral daily  heparin  Injectable 5000 Unit(s) SubCutaneous every 8 hours  hydrALAZINE 25 milliGRAM(s) Oral every 8 hours  insulin lispro (HumaLOG) corrective regimen sliding scale   SubCutaneous every 6 hours  metoprolol succinate ER 50 milliGRAM(s) Oral daily  neomycin/BACItracin/polymyxin Ointment 1 Application(s) Right EYE every 6 hours  simvastatin 10 milliGRAM(s) Oral at bedtime      MEDICATIONS  (PRN):  guaiFENesin   Syrup  (Sugar-Free) 100 milliGRAM(s) Oral every 6 hours PRN Cough  HYDROcodone/homatropine Syrup 5 milliLiter(s) Oral every 6 hours PRN Cough     Medications up to date at time of exam.    PHYSICAL EXAMINATION:  Patient has no new complaints.  GENERAL: The patient is a well-developed, well-nourished, in no apparent distress.     Vital Signs Last 24 Hrs  T(C): 36.7 (03 Aug 2018 11:21), Max: 36.8 (03 Aug 2018 07:11)  T(F): 98 (03 Aug 2018 11:21), Max: 98.3 (03 Aug 2018 07:11)  HR: 76 (03 Aug 2018 11:21) (63 - 79)  BP: 110/44 (03 Aug 2018 11:21) (110/44 - 146/59)  BP(mean): 52 (02 Aug 2018 18:00) (52 - 79)  RR: 18 (03 Aug 2018 11:21) (18 - 27)  SpO2: 100% (03 Aug 2018 11:21) (95% - 100%)   (if applicable)    Chest Tube (if applicable)    HEENT: Head is normocephalic and atraumatic.     NECK: Supple, no palpable adenopathy.    LUNGS: Clear to auscultation, no wheezing, rales, or rhonchi.    HEART: Regular rate and rhythm without murmur.    ABDOMEN: Soft, nontender, and nondistended.      EXTREMITIES: Without any cyanosis, clubbing, rash, lesions or edema.    NEUROLOGIC: Awake, alert.    SKIN: Warm, dry, good turgor.    LABS:                        9.2    8.6   )-----------( 172      ( 03 Aug 2018 06:30 )             27.4     08-03    135  |  98  |  73<H>  ----------------------------<  176<H>  4.3   |  29  |  3.90<H>    Ca    8.9      03 Aug 2018 06:30  Phos  4.0     08-03  Mg     2.5     08-03          ABG - ( 02 Aug 2018 05:22 )  pH, Arterial: 7.40  pH, Blood: x     /  pCO2: 44    /  pO2: 150   / HCO3: 27    / Base Excess: 2.6   /  SaO2: >99           MICROBIOLOGY: (if applicable)    RADIOLOGY & ADDITIONAL STUDIES:  EKG:   CXR:  ECHO:    IMPRESSION: 85y Male PAST MEDICAL & SURGICAL HISTORY:  Stented coronary artery  Pacemaker  CKD (chronic kidney disease)  Hypertension  AICD (automatic cardioverter/defibrillator) present  CAD (coronary artery disease)  Kidney carcinoma: s/p right nephrectomy  Prostate cancer  Kidney carcinoma: s/p right nephrectomy  Hernia: Hernioplasty  Prostate: prostatectomy       80 y/o male from home, ambulates independently with PMHX of CAD, + Stent placed in 2009 on Plavix QOD, AICD placed in 2011, Prostate CA, S/P surgery and RTX, Rt Nephrectomy for Rt Renal cell CA, Htn, HLD  Asthma stable, CKD ( baseline creatinine is 2.0) , Bradycardia (s/p [employer] AICD), asthma, CKD, and COPD (not on home O2) S/P Colonoscopy in March 2015 c/w Diverticulosis , HX of Polyps removed, PVD ( s/p balloon left leg stent) came with complain of dyspnea and blurry vision since 1 week. Patient states he is developing worsening shortness of breath since 1 week to an extent that he is unable to walk from room to kitchen, on exertion and on talking. He also developed blurry vision in Rt eye since 1 week with purulent discharge which progressively got worse since 1 week. He denies chest pain, fever, chills, nausea, vomiting, diarrhea, constipation or any other complains.     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     Agree with above assessment and plan as transcribed.

## 2018-08-03 NOTE — PROGRESS NOTE ADULT - PROBLEM SELECTOR PLAN 1
improved  oral lasix 40mg daily
patient unable to tolerate CT  plan for OR for urgent bronchoscopy
Patient in impending respiratory failure due to vocal cord edema and aspiration of foreign body  S/p bronchoscopy and intubation; Patient self-extubated while in ICU and was placed on nasal cannula  ENT recommends: Iv decadron 10mg q8 hours

## 2018-08-03 NOTE — PHYSICAL THERAPY INITIAL EVALUATION ADULT - ADDITIONAL COMMENTS
Pt. states lives in private home; son lives with him. Has 3 steps into the stoop. Has ramp between the garage to the side door.   Pt. states is (I) at home; has RW and SAC.

## 2018-08-03 NOTE — PROGRESS NOTE ADULT - PROBLEM SELECTOR PLAN 3
clonidine 0.2mg TID  lasix 40mg PO  hydralazine 25mg PO q8  metoprolol 50mg daily no foreign body found on bronch  patient downgraded from ICU this AM  monitor respiratory status

## 2018-08-03 NOTE — PROGRESS NOTE ADULT - SUBJECTIVE AND OBJECTIVE BOX
Patient is a 85y old  Male who presents with a chief complaint of Shortness of breath (01 Aug 2018 11:22)    pt seen in tele [ x ], reg med floor [   ], bed [ x ], chair at bedside [  ], a+o x3 [ x ], lethargic [  ],  nad [x  ]    s/p downgrade from icu. pt's voice now return to normal      Allergies    aspirin (Anaphylaxis)        Vitals    T(F): 98.3 (08-03-18 @ 07:11), Max: 98.3 (08-02-18 @ 13:00)  HR: 63 (08-03-18 @ 07:11) (63 - 79)  BP: 128/44 (08-03-18 @ 07:11) (111/32 - 146/59)  RR: 18 (08-03-18 @ 07:11) (18 - 27)  SpO2: 99% (08-03-18 @ 07:11) (95% - 100%)  Wt(kg): --  CAPILLARY BLOOD GLUCOSE      POCT Blood Glucose.: 196 mg/dL (03 Aug 2018 06:31)      Labs                          9.2    8.6   )-----------( 172      ( 03 Aug 2018 06:30 )             27.4       08-03    135  |  98  |  73<H>  ----------------------------<  176<H>  4.3   |  29  |  3.90<H>    Ca    8.9      03 Aug 2018 06:30  Phos  4.0     08-03  Mg     2.5     08-03                  Radiology Results      Meds    MEDICATIONS  (STANDING):  ALBUTerol/ipratropium for Nebulization 3 milliLiter(s) Nebulizer every 6 hours  cloNIDine 0.2 milliGRAM(s) Oral three times a day  clopidogrel Tablet 75 milliGRAM(s) Oral daily  docusate sodium 100 milliGRAM(s) Oral three times a day  furosemide    Tablet 40 milliGRAM(s) Oral daily  heparin  Injectable 5000 Unit(s) SubCutaneous every 8 hours  hydrALAZINE 25 milliGRAM(s) Oral every 8 hours  insulin lispro (HumaLOG) corrective regimen sliding scale   SubCutaneous every 6 hours  metoprolol succinate ER 50 milliGRAM(s) Oral daily  neomycin/BACItracin/polymyxin Ointment 1 Application(s) Right EYE every 6 hours  simvastatin 10 milliGRAM(s) Oral at bedtime      MEDICATIONS  (PRN):  guaiFENesin   Syrup  (Sugar-Free) 100 milliGRAM(s) Oral every 6 hours PRN Cough      Physical Exam      Neuro :  no focal deficits  Respiratory: cta b/l  CV: RRR, S1S2, no murmurs,   Abdominal: Soft, NT, ND +BS,  Extremities: No edema, + peripheral pulses    ASSESSMENT      chf exacerb,   left pleural eff,   acute on ckd,   right eye conjunctivitis,   r/o aspir of foreign body  h/o CAD, + Stent placed in 2009 on Plavix QOD,  AICD placed in 2011,   Prostate CA S/P surgery and RTX,   Rt Nephrectomy for Rt Renal cell CA,   Htn,   HLD    Asthma   CKD ( baseline creatinine is 2.0) ,   Bradycardia (s/p [employer] AICD),   COPD (not on home O2)   HX of Polyps removed, PVD ( s/p balloon left leg stent)         PLAN    cont tele  cont lopressor, lasix, plavix, and statin,   cardio f/u   echo with Ejection Fraction Visual Estimate: >55 %, Mild diastolic dysfunction (stage I) Mild pulmonary hypertension noted above.  inpt stress test cancelled as per cardio  pt to f/u outpt for stress test  ce q8 x3 noted   pulm f/u   repeat cxr with no significant change from previous  ct neck and chest to r/o aspiration cancelled as pt could not tolerate procedure so pt had to have emergent bronchoscopy  s/p bronch showing vocal cord swelling  ent cons noted  decadron doses completed  renal f/u  cont current meds   d/c plan for am if stable

## 2018-08-03 NOTE — PHYSICAL THERAPY INITIAL EVALUATION ADULT - IMPAIRMENTS FOUND, PT EVAL
muscle strength/gait, locomotion, and balance
muscle strength/gait, locomotion, and balance/aerobic capacity/endurance

## 2018-08-03 NOTE — PHYSICAL THERAPY INITIAL EVALUATION ADULT - GENERAL OBSERVATIONS, REHAB EVAL
Pt seen lying in supine in NAD.
Pt. received seated at the edge of bed; w/ O2 @ 2 li, monitor and Iv line in place. Pt. is axOx3.

## 2018-08-03 NOTE — PHYSICAL THERAPY INITIAL EVALUATION ADULT - LIVES WITH, PROFILE
children
children/lives with daughter and son-in-law in a private house with a ramp to enter and 3 steps inside.

## 2018-08-03 NOTE — PROGRESS NOTE ADULT - SUBJECTIVE AND OBJECTIVE BOX
PGY1 Note discussed with supervising resident and primary attending.    Patient is a 85y old  Male who presents with a chief complaint of Shortness of breath (01 Aug 2018 11:22)    INTERVAL HPI/OVERNIGHT EVENTS:    Mr. Leblanc is seen at the bedside. He reports feeling well today. He endorses a nonproductive cough, but has no other complaints at this time.    MEDICATIONS  (STANDING):  cloNIDine 0.2 milliGRAM(s) Oral three times a day  clopidogrel Tablet 75 milliGRAM(s) Oral daily  docusate sodium 100 milliGRAM(s) Oral three times a day  furosemide   Injectable 60 milliGRAM(s) IV Push daily  heparin  Injectable 5000 Unit(s) SubCutaneous every 8 hours  hydrALAZINE 25 milliGRAM(s) Oral every 8 hours  metoprolol succinate ER 50 milliGRAM(s) Oral daily  neomycin/BACItracin/polymyxin Ointment 1 Application(s) Right EYE every 6 hours  simvastatin 10 milliGRAM(s) Oral at bedtime    Allergies    aspirin (Anaphylaxis)    Intolerances    REVIEW OF SYSTEMS:  CONSTITUTIONAL: No fever, weight loss, or fatigue  RESPIRATORY: cough; no wheezing, chills or hemoptysis; No shortness of breath  CARDIOVASCULAR: No chest pain, palpitations, dizziness, or leg swelling  GASTROINTESTINAL: No abdominal or epigastric pain. No nausea, vomiting, or hematemesis; No diarrhea or constipation. No melena or hematochezia.  NEUROLOGICAL: No headaches, memory loss, loss of strength, numbness, or tremors  SKIN: No itching, burning, rashes, or lesions     Vital Signs Last 24 Hrs  T(C): 36.7 (03 Aug 2018 11:21), Max: 36.8 (03 Aug 2018 07:11)  T(F): 98 (03 Aug 2018 11:21), Max: 98.3 (03 Aug 2018 07:11)  HR: 76 (03 Aug 2018 11:21) (63 - 79)  BP: 110/44 (03 Aug 2018 11:21) (110/44 - 143/50)  BP(mean): 52 (02 Aug 2018 18:00) (52 - 60)  RR: 18 (03 Aug 2018 11:21) (18 - 27)  SpO2: 100% (03 Aug 2018 11:21) (95% - 100%)    PHYSICAL EXAM:  GENERAL: NAD, well-groomed, well-developed  HEAD:  Atraumatic, Normocephalic  EYES: EOMI, PERRLA, conjunctiva and sclera clear  NECK: Supple, No JVD, Normal thyroid  CHEST/LUNG: wheezing in all lung fields  HEART: Regular rate and rhythm; No murmurs, rubs, or gallops  ABDOMEN: Soft, Nontender, Nondistended; Bowel sounds present  NERVOUS SYSTEM:  Alert & Oriented X3, Good concentration; Motor Strength 5/5 B/L   EXTREMITIES:  2+ Peripheral Pulses, No clubbing, cyanosis; no edema at present - patient says much improved from admission    LABS:                                   9.2    8.6   )-----------( 172      ( 03 Aug 2018 06:30 )             27.4     08-03    135  |  98  |  73<H>  ----------------------------<  176<H>  4.3   |  29  |  3.90<H>    Ca    8.9      03 Aug 2018 06:30  Phos  4.0     08-03  Mg     2.5     08-03      RADIOLOGY & ADDITIONAL TESTS:    Imaging Personally Reviewed:  [X ] YES  [ ] NO    Consultant(s) Notes Reviewed:  [X] YES  [ ] NO    CONCLUSIONS:  1. Trace mitral regurgitation.  2.  Trace aortic regurgitation.  3. Moderately dilated left atrium.  LA volume index = 47  cc/m2.  4. Mild concentric left ventricular hypertrophy.  5. Normal left ventricular function. Mild diastolic  dysfunction (stage I).  6. Normal right ventricular size and function. A device  lead is visualized in the right heart.  7. RV systolic pressure is 44 mm Hg. Mild pulmonary  hypertension.    CXR:    IMPRESSION: Unchanged chest as above.    US KIDNEY BLADDER    IMPRESSION:  No left hydronephrosis.     Right nephrectomy

## 2018-08-03 NOTE — PHYSICAL THERAPY INITIAL EVALUATION ADULT - PERTINENT HX OF CURRENT PROBLEM, REHAB EVAL
Pt admitted to ED with complaints of shortness of breath and blurry vision.
Pt. admitted from home due to chest pain and shortness of breath.

## 2018-08-03 NOTE — PROGRESS NOTE ADULT - PROBLEM SELECTOR PLAN 4
improving  continue Neosporin eye ointment daily clonidine 0.2mg TID  lasix 40mg PO  hydralazine 25mg PO q8  metoprolol 50mg daily

## 2018-08-03 NOTE — PROGRESS NOTE ADULT - ASSESSMENT
Mr. Leblanc is an 85 year old male with PMH of CHF, CKD, CAD s/p stents, pacemaker, and HTN presentign with chief complaint of dyspnea on exertion and right blurry vision. Patient admitted for CHF exacerbation with suspected infection of right eye. Patient was downgraded from ICU this AM. He reports feeling well. Will monitor respiratory status. If medically stable, patient ok for discharge tomorrow.

## 2018-08-04 VITALS
HEART RATE: 71 BPM | TEMPERATURE: 99 F | SYSTOLIC BLOOD PRESSURE: 112 MMHG | RESPIRATION RATE: 17 BRPM | DIASTOLIC BLOOD PRESSURE: 43 MMHG | OXYGEN SATURATION: 97 %

## 2018-08-04 LAB
GLUCOSE BLDC GLUCOMTR-MCNC: 111 MG/DL — HIGH (ref 70–99)
GLUCOSE BLDC GLUCOMTR-MCNC: 187 MG/DL — HIGH (ref 70–99)

## 2018-08-04 PROCEDURE — 85610 PROTHROMBIN TIME: CPT

## 2018-08-04 PROCEDURE — 82962 GLUCOSE BLOOD TEST: CPT

## 2018-08-04 PROCEDURE — 83690 ASSAY OF LIPASE: CPT

## 2018-08-04 PROCEDURE — 94003 VENT MGMT INPAT SUBQ DAY: CPT

## 2018-08-04 PROCEDURE — 84560 ASSAY OF URINE/URIC ACID: CPT

## 2018-08-04 PROCEDURE — 84156 ASSAY OF PROTEIN URINE: CPT

## 2018-08-04 PROCEDURE — 94002 VENT MGMT INPAT INIT DAY: CPT

## 2018-08-04 PROCEDURE — 83935 ASSAY OF URINE OSMOLALITY: CPT

## 2018-08-04 PROCEDURE — 84484 ASSAY OF TROPONIN QUANT: CPT

## 2018-08-04 PROCEDURE — 82550 ASSAY OF CK (CPK): CPT

## 2018-08-04 PROCEDURE — 71045 X-RAY EXAM CHEST 1 VIEW: CPT

## 2018-08-04 PROCEDURE — 94640 AIRWAY INHALATION TREATMENT: CPT

## 2018-08-04 PROCEDURE — 97161 PT EVAL LOW COMPLEX 20 MIN: CPT

## 2018-08-04 PROCEDURE — 82436 ASSAY OF URINE CHLORIDE: CPT

## 2018-08-04 PROCEDURE — 84100 ASSAY OF PHOSPHORUS: CPT

## 2018-08-04 PROCEDURE — 83036 HEMOGLOBIN GLYCOSYLATED A1C: CPT

## 2018-08-04 PROCEDURE — 80061 LIPID PANEL: CPT

## 2018-08-04 PROCEDURE — 82803 BLOOD GASES ANY COMBINATION: CPT

## 2018-08-04 PROCEDURE — 84133 ASSAY OF URINE POTASSIUM: CPT

## 2018-08-04 PROCEDURE — 85027 COMPLETE CBC AUTOMATED: CPT

## 2018-08-04 PROCEDURE — 82553 CREATINE MB FRACTION: CPT

## 2018-08-04 PROCEDURE — 93005 ELECTROCARDIOGRAM TRACING: CPT

## 2018-08-04 PROCEDURE — 82570 ASSAY OF URINE CREATININE: CPT

## 2018-08-04 PROCEDURE — 80048 BASIC METABOLIC PNL TOTAL CA: CPT

## 2018-08-04 PROCEDURE — 99285 EMERGENCY DEPT VISIT HI MDM: CPT | Mod: 25

## 2018-08-04 PROCEDURE — 82607 VITAMIN B-12: CPT

## 2018-08-04 PROCEDURE — 83880 ASSAY OF NATRIURETIC PEPTIDE: CPT

## 2018-08-04 PROCEDURE — 80053 COMPREHEN METABOLIC PANEL: CPT

## 2018-08-04 PROCEDURE — 76770 US EXAM ABDO BACK WALL COMP: CPT

## 2018-08-04 PROCEDURE — 84300 ASSAY OF URINE SODIUM: CPT

## 2018-08-04 PROCEDURE — 85730 THROMBOPLASTIN TIME PARTIAL: CPT

## 2018-08-04 PROCEDURE — 93306 TTE W/DOPPLER COMPLETE: CPT

## 2018-08-04 PROCEDURE — 83735 ASSAY OF MAGNESIUM: CPT

## 2018-08-04 RX ADMIN — Medication 25 MILLIGRAM(S): at 14:47

## 2018-08-04 RX ADMIN — HEPARIN SODIUM 5000 UNIT(S): 5000 INJECTION INTRAVENOUS; SUBCUTANEOUS at 14:47

## 2018-08-04 RX ADMIN — Medication 25 MILLIGRAM(S): at 06:03

## 2018-08-04 RX ADMIN — Medication 1 APPLICATION(S): at 06:04

## 2018-08-04 RX ADMIN — Medication 50 MILLIGRAM(S): at 06:03

## 2018-08-04 RX ADMIN — Medication 3 MILLILITER(S): at 08:58

## 2018-08-04 RX ADMIN — Medication 0.2 MILLIGRAM(S): at 06:03

## 2018-08-04 RX ADMIN — Medication 100 MILLIGRAM(S): at 14:47

## 2018-08-04 RX ADMIN — HEPARIN SODIUM 5000 UNIT(S): 5000 INJECTION INTRAVENOUS; SUBCUTANEOUS at 06:03

## 2018-08-04 RX ADMIN — Medication 1 APPLICATION(S): at 12:34

## 2018-08-04 RX ADMIN — CLOPIDOGREL BISULFATE 75 MILLIGRAM(S): 75 TABLET, FILM COATED ORAL at 12:33

## 2018-08-04 RX ADMIN — Medication 1: at 12:32

## 2018-08-04 RX ADMIN — Medication 100 MILLIGRAM(S): at 06:03

## 2018-08-04 RX ADMIN — Medication 0.2 MILLIGRAM(S): at 14:47

## 2018-08-04 NOTE — PROGRESS NOTE ADULT - PROVIDER SPECIALTY LIST ADULT
Cardiology
Critical Care
Internal Medicine
Nephrology
Pulmonology
Cardiology

## 2018-08-04 NOTE — PROGRESS NOTE ADULT - SUBJECTIVE AND OBJECTIVE BOX
Patient is a 85y old  Male who presents with a chief complaint of Shortness of breath (01 Aug 2018 11:22)      pt seen in tele [ x ], reg med floor [   ], bed [ x ], chair at bedside [  ], a+o x3 [ x ], lethargic [  ],  nad [x  ]      Allergies    aspirin (Anaphylaxis)        Vitals    T(F): 98.2 (08-04-18 @ 07:19), Max: 98.4 (08-04-18 @ 00:00)  HR: 69 (08-04-18 @ 07:19) (64 - 76)  BP: 119/49 (08-04-18 @ 07:19) (109/36 - 124/41)  RR: 18 (08-04-18 @ 07:19) (17 - 18)  SpO2: 100% (08-04-18 @ 07:19) (96% - 100%)  Wt(kg): --  CAPILLARY BLOOD GLUCOSE      POCT Blood Glucose.: 111 mg/dL (04 Aug 2018 06:08)      Labs                          9.2    8.6   )-----------( 172      ( 03 Aug 2018 06:30 )             27.4       08-03    135  |  98  |  73<H>  ----------------------------<  176<H>  4.3   |  29  |  3.90<H>    Ca    8.9      03 Aug 2018 06:30  Phos  4.0     08-03  Mg     2.5     08-03                  Radiology Results      Meds    MEDICATIONS  (STANDING):  ALBUTerol/ipratropium for Nebulization 3 milliLiter(s) Nebulizer every 6 hours  cloNIDine 0.2 milliGRAM(s) Oral three times a day  clopidogrel Tablet 75 milliGRAM(s) Oral daily  docusate sodium 100 milliGRAM(s) Oral three times a day  heparin  Injectable 5000 Unit(s) SubCutaneous every 8 hours  hydrALAZINE 25 milliGRAM(s) Oral every 8 hours  insulin lispro (HumaLOG) corrective regimen sliding scale   SubCutaneous every 6 hours  metoprolol succinate ER 50 milliGRAM(s) Oral daily  neomycin/BACItracin/polymyxin Ointment 1 Application(s) Right EYE every 6 hours  simvastatin 10 milliGRAM(s) Oral at bedtime      MEDICATIONS  (PRN):  guaiFENesin   Syrup  (Sugar-Free) 100 milliGRAM(s) Oral every 6 hours PRN Cough  HYDROcodone/homatropine Syrup 5 milliLiter(s) Oral every 6 hours PRN Cough      Physical Exam    Respiratory: cta b/l  CV: RRR, S1S2, no murmurs,   Abdominal: Soft, NT, ND +BS,  Extremities: No edema, + peripheral pulses    ASSESSMENT      chf exacerb,   left pleural eff,   acute on ckd,   right eye conjunctivitis,   r/o aspir of foreign body  h/o CAD, + Stent placed in 2009 on Plavix QOD,  AICD placed in 2011,   Prostate CA S/P surgery and RTX,   Rt Nephrectomy for Rt Renal cell CA,   Htn,   HLD    Asthma   CKD ( baseline creatinine is 2.0) ,   Bradycardia (s/p Medtronic AICD),   COPD (not on home O2)   HX of Polyps removed, PVD ( s/p balloon left leg stent)         PLAN    cont tele  cont lopressor, lasix, plavix, and statin,   cardio f/u   echo with Ejection Fraction Visual Estimate: >55 %, Mild diastolic dysfunction (stage I) Mild pulmonary hypertension noted above.  inpt stress test cancelled as per cardio  pt to f/u outpt for stress test  ce q8 x3 noted   pulm f/u   repeat cxr with no significant change from previous  ct neck and chest to r/o aspiration cancelled as pt could not tolerate procedure so pt had to have emergent bronchoscopy  s/p bronch showing vocal cord swelling  ent cons noted  decadron doses completed  renal f/u  cont current meds   d/c plan for home with home phys tx

## 2018-08-05 ENCOUNTER — INPATIENT (INPATIENT)
Facility: HOSPITAL | Age: 83
LOS: 3 days | Discharge: HOME | DRG: 205 | End: 2018-08-09
Attending: INTERNAL MEDICINE | Admitting: INTERNAL MEDICINE
Payer: MEDICARE

## 2018-08-05 VITALS
OXYGEN SATURATION: 99 % | SYSTOLIC BLOOD PRESSURE: 181 MMHG | RESPIRATION RATE: 18 BRPM | TEMPERATURE: 98 F | DIASTOLIC BLOOD PRESSURE: 74 MMHG | HEART RATE: 89 BPM

## 2018-08-05 DIAGNOSIS — J44.1 CHRONIC OBSTRUCTIVE PULMONARY DISEASE WITH (ACUTE) EXACERBATION: ICD-10-CM

## 2018-08-05 DIAGNOSIS — J90 PLEURAL EFFUSION, NOT ELSEWHERE CLASSIFIED: ICD-10-CM

## 2018-08-05 DIAGNOSIS — N18.9 CHRONIC KIDNEY DISEASE, UNSPECIFIED: ICD-10-CM

## 2018-08-05 DIAGNOSIS — I25.10 ATHEROSCLEROTIC HEART DISEASE OF NATIVE CORONARY ARTERY WITHOUT ANGINA PECTORIS: ICD-10-CM

## 2018-08-05 DIAGNOSIS — I50.9 HEART FAILURE, UNSPECIFIED: ICD-10-CM

## 2018-08-05 DIAGNOSIS — Z29.9 ENCOUNTER FOR PROPHYLACTIC MEASURES, UNSPECIFIED: ICD-10-CM

## 2018-08-05 DIAGNOSIS — I10 ESSENTIAL (PRIMARY) HYPERTENSION: ICD-10-CM

## 2018-08-05 DIAGNOSIS — J18.1 LOBAR PNEUMONIA, UNSPECIFIED ORGANISM: ICD-10-CM

## 2018-08-05 PROBLEM — Z95.5 PRESENCE OF CORONARY ANGIOPLASTY IMPLANT AND GRAFT: Chronic | Status: ACTIVE | Noted: 2018-07-29

## 2018-08-05 PROBLEM — Z95.0 PRESENCE OF CARDIAC PACEMAKER: Chronic | Status: ACTIVE | Noted: 2018-07-29

## 2018-08-05 LAB
ALBUMIN SERPL ELPH-MCNC: 3.6 G/DL — SIGNIFICANT CHANGE UP (ref 3.5–5)
ALP SERPL-CCNC: 72 U/L — SIGNIFICANT CHANGE UP (ref 40–120)
ALT FLD-CCNC: 17 U/L DA — SIGNIFICANT CHANGE UP (ref 10–60)
ANION GAP SERPL CALC-SCNC: 11 MMOL/L — SIGNIFICANT CHANGE UP (ref 5–17)
APTT BLD: 27.3 SEC — LOW (ref 27.5–37.4)
AST SERPL-CCNC: 14 U/L — SIGNIFICANT CHANGE UP (ref 10–40)
BASOPHILS # BLD AUTO: 0.1 K/UL — SIGNIFICANT CHANGE UP (ref 0–0.2)
BASOPHILS NFR BLD AUTO: 0.5 % — SIGNIFICANT CHANGE UP (ref 0–2)
BILIRUB SERPL-MCNC: 1.1 MG/DL — SIGNIFICANT CHANGE UP (ref 0.2–1.2)
BUN SERPL-MCNC: 73 MG/DL — HIGH (ref 7–18)
CALCIUM SERPL-MCNC: 9.3 MG/DL — SIGNIFICANT CHANGE UP (ref 8.4–10.5)
CHLORIDE SERPL-SCNC: 102 MMOL/L — SIGNIFICANT CHANGE UP (ref 96–108)
CK SERPL-CCNC: 20 U/L — LOW (ref 35–232)
CO2 SERPL-SCNC: 27 MMOL/L — SIGNIFICANT CHANGE UP (ref 22–31)
CREAT SERPL-MCNC: 2.89 MG/DL — HIGH (ref 0.5–1.3)
EOSINOPHIL # BLD AUTO: 0 K/UL — SIGNIFICANT CHANGE UP (ref 0–0.5)
EOSINOPHIL NFR BLD AUTO: 0.4 % — SIGNIFICANT CHANGE UP (ref 0–6)
GLUCOSE SERPL-MCNC: 138 MG/DL — HIGH (ref 70–99)
HCT VFR BLD CALC: 32.1 % — LOW (ref 39–50)
HGB BLD-MCNC: 10.6 G/DL — LOW (ref 13–17)
INR BLD: 1.04 RATIO — SIGNIFICANT CHANGE UP (ref 0.88–1.16)
LYMPHOCYTES # BLD AUTO: 1.1 K/UL — SIGNIFICANT CHANGE UP (ref 1–3.3)
LYMPHOCYTES # BLD AUTO: 8.9 % — LOW (ref 13–44)
MCHC RBC-ENTMCNC: 30 PG — SIGNIFICANT CHANGE UP (ref 27–34)
MCHC RBC-ENTMCNC: 33.2 GM/DL — SIGNIFICANT CHANGE UP (ref 32–36)
MCV RBC AUTO: 90.2 FL — SIGNIFICANT CHANGE UP (ref 80–100)
MONOCYTES # BLD AUTO: 1.5 K/UL — HIGH (ref 0–0.9)
MONOCYTES NFR BLD AUTO: 12.2 % — SIGNIFICANT CHANGE UP (ref 2–14)
NEUTROPHILS # BLD AUTO: 9.4 K/UL — HIGH (ref 1.8–7.4)
NEUTROPHILS NFR BLD AUTO: 78 % — HIGH (ref 43–77)
NT-PROBNP SERPL-SCNC: 7116 PG/ML — HIGH (ref 0–450)
PLATELET # BLD AUTO: 251 K/UL — SIGNIFICANT CHANGE UP (ref 150–400)
POTASSIUM SERPL-MCNC: 4 MMOL/L — SIGNIFICANT CHANGE UP (ref 3.5–5.3)
POTASSIUM SERPL-SCNC: 4 MMOL/L — SIGNIFICANT CHANGE UP (ref 3.5–5.3)
PROT SERPL-MCNC: 7.4 G/DL — SIGNIFICANT CHANGE UP (ref 6–8.3)
PROTHROM AB SERPL-ACNC: 11.3 SEC — SIGNIFICANT CHANGE UP (ref 9.8–12.7)
RBC # BLD: 3.56 M/UL — LOW (ref 4.2–5.8)
RBC # FLD: 13.2 % — SIGNIFICANT CHANGE UP (ref 10.3–14.5)
SODIUM SERPL-SCNC: 140 MMOL/L — SIGNIFICANT CHANGE UP (ref 135–145)
TROPONIN I SERPL-MCNC: 0.03 NG/ML — SIGNIFICANT CHANGE UP (ref 0–0.04)
WBC # BLD: 12 K/UL — HIGH (ref 3.8–10.5)
WBC # FLD AUTO: 12 K/UL — HIGH (ref 3.8–10.5)

## 2018-08-05 PROCEDURE — 99285 EMERGENCY DEPT VISIT HI MDM: CPT

## 2018-08-05 PROCEDURE — 71250 CT THORAX DX C-: CPT | Mod: 26

## 2018-08-05 PROCEDURE — 71045 X-RAY EXAM CHEST 1 VIEW: CPT | Mod: 26

## 2018-08-05 RX ORDER — IPRATROPIUM/ALBUTEROL SULFATE 18-103MCG
3 AEROSOL WITH ADAPTER (GRAM) INHALATION EVERY 6 HOURS
Qty: 0 | Refills: 0 | Status: DISCONTINUED | OUTPATIENT
Start: 2018-08-05 | End: 2018-08-09

## 2018-08-05 RX ORDER — SODIUM CHLORIDE 9 MG/ML
3 INJECTION INTRAMUSCULAR; INTRAVENOUS; SUBCUTANEOUS ONCE
Qty: 0 | Refills: 0 | Status: COMPLETED | OUTPATIENT
Start: 2018-08-05 | End: 2018-08-05

## 2018-08-05 RX ORDER — MONTELUKAST 4 MG/1
10 TABLET, CHEWABLE ORAL DAILY
Qty: 0 | Refills: 0 | Status: DISCONTINUED | OUTPATIENT
Start: 2018-08-05 | End: 2018-08-09

## 2018-08-05 RX ORDER — SIMVASTATIN 20 MG/1
10 TABLET, FILM COATED ORAL AT BEDTIME
Qty: 0 | Refills: 0 | Status: DISCONTINUED | OUTPATIENT
Start: 2018-08-05 | End: 2018-08-09

## 2018-08-05 RX ORDER — FUROSEMIDE 40 MG
40 TABLET ORAL ONCE
Qty: 0 | Refills: 0 | Status: COMPLETED | OUTPATIENT
Start: 2018-08-05 | End: 2018-08-05

## 2018-08-05 RX ORDER — METOPROLOL TARTRATE 50 MG
50 TABLET ORAL DAILY
Qty: 0 | Refills: 0 | Status: DISCONTINUED | OUTPATIENT
Start: 2018-08-05 | End: 2018-08-09

## 2018-08-05 RX ORDER — CLOPIDOGREL BISULFATE 75 MG/1
75 TABLET, FILM COATED ORAL DAILY
Qty: 0 | Refills: 0 | Status: DISCONTINUED | OUTPATIENT
Start: 2018-08-05 | End: 2018-08-09

## 2018-08-05 RX ORDER — PIPERACILLIN AND TAZOBACTAM 4; .5 G/20ML; G/20ML
3.38 INJECTION, POWDER, LYOPHILIZED, FOR SOLUTION INTRAVENOUS EVERY 12 HOURS
Qty: 0 | Refills: 0 | Status: DISCONTINUED | OUTPATIENT
Start: 2018-08-05 | End: 2018-08-09

## 2018-08-05 RX ORDER — HYDRALAZINE HCL 50 MG
25 TABLET ORAL EVERY 8 HOURS
Qty: 0 | Refills: 0 | Status: DISCONTINUED | OUTPATIENT
Start: 2018-08-05 | End: 2018-08-09

## 2018-08-05 RX ORDER — ENOXAPARIN SODIUM 100 MG/ML
30 INJECTION SUBCUTANEOUS DAILY
Qty: 0 | Refills: 0 | Status: DISCONTINUED | OUTPATIENT
Start: 2018-08-05 | End: 2018-08-09

## 2018-08-05 RX ORDER — IPRATROPIUM/ALBUTEROL SULFATE 18-103MCG
3 AEROSOL WITH ADAPTER (GRAM) INHALATION ONCE
Qty: 0 | Refills: 0 | Status: COMPLETED | OUTPATIENT
Start: 2018-08-05 | End: 2018-08-05

## 2018-08-05 RX ORDER — OXYCODONE AND ACETAMINOPHEN 5; 325 MG/1; MG/1
1 TABLET ORAL EVERY 6 HOURS
Qty: 0 | Refills: 0 | Status: DISCONTINUED | OUTPATIENT
Start: 2018-08-05 | End: 2018-08-09

## 2018-08-05 RX ORDER — PANTOPRAZOLE SODIUM 20 MG/1
40 TABLET, DELAYED RELEASE ORAL DAILY
Qty: 0 | Refills: 0 | Status: DISCONTINUED | OUTPATIENT
Start: 2018-08-05 | End: 2018-08-09

## 2018-08-05 RX ADMIN — Medication 40 MILLIGRAM(S): at 22:06

## 2018-08-05 RX ADMIN — OXYCODONE AND ACETAMINOPHEN 1 TABLET(S): 5; 325 TABLET ORAL at 19:52

## 2018-08-05 RX ADMIN — Medication 40 MILLIGRAM(S): at 16:00

## 2018-08-05 RX ADMIN — Medication 0.3 MILLIGRAM(S): at 22:06

## 2018-08-05 RX ADMIN — Medication 3 MILLILITER(S): at 23:15

## 2018-08-05 RX ADMIN — Medication 3 MILLILITER(S): at 16:00

## 2018-08-05 RX ADMIN — OXYCODONE AND ACETAMINOPHEN 1 TABLET(S): 5; 325 TABLET ORAL at 23:15

## 2018-08-05 RX ADMIN — CLOPIDOGREL BISULFATE 75 MILLIGRAM(S): 75 TABLET, FILM COATED ORAL at 23:23

## 2018-08-05 RX ADMIN — Medication 50 MILLIGRAM(S): at 23:22

## 2018-08-05 RX ADMIN — PIPERACILLIN AND TAZOBACTAM 25 GRAM(S): 4; .5 INJECTION, POWDER, LYOPHILIZED, FOR SOLUTION INTRAVENOUS at 17:55

## 2018-08-05 RX ADMIN — SODIUM CHLORIDE 3 MILLILITER(S): 9 INJECTION INTRAMUSCULAR; INTRAVENOUS; SUBCUTANEOUS at 12:59

## 2018-08-05 RX ADMIN — Medication 125 MILLIGRAM(S): at 16:00

## 2018-08-05 RX ADMIN — Medication 25 MILLIGRAM(S): at 22:06

## 2018-08-05 RX ADMIN — SIMVASTATIN 10 MILLIGRAM(S): 20 TABLET, FILM COATED ORAL at 23:15

## 2018-08-05 NOTE — ED ADULT NURSE NOTE - ED STAT RN HANDOFF DETAILS
endorsed to RAYMUNDO Dale in holding area in stable condition for continuation of care. pt a&ox3, admitted for acute on chronic CHF and pleural effusion. 20G right arm. on 2L NC. stage 2 pressure ulcer to left gluteal

## 2018-08-05 NOTE — PATIENT PROFILE ADULT. - BILL OF RIGHTS/ADMISSION INFORMATION PROVIDED TO:
8/5/17 - doing well, no events overnight, neuro surg and NCC monitoring for swelling/suboccipital crani watch   8/7/17 NAEON. Remains on 3% and cardene; hemicrani watch.   8/9/17 neurologically stable, repeat CTA stable  8/10/17 Stable CTH and neuro exam.   8/11/17 2% required restarting overnight due to hyponatremia. Patient will likely go to [facility] post-discharge. Continuing IV abx x 6 weeks.   8/12/ Patient reports no acute events overnight; persistent complaints of scrotal pain, being followed by Urology.   8/13: Patient now medically stable and continuing IV abx for 6 week course, planned disposition to Rehab. S/w Case Mgmt on call regarding eligibility for [facility] today.   8/14/17 NAEON. Awaiting placement to rehab.   8/15/17 - doing well overall, ready for discharge to inpatient rehab today.     Will dc to rehab with 6 weeks abx for endocarditis. ID made recs with end date 9/16/17.   The patient with urostomy - so dc with bolanos.      Patient

## 2018-08-05 NOTE — H&P ADULT - ASSESSMENT
Patient is a 80 y/o male from home, ambulates with a cane with PMHX of CAD, + Stent placed in 2009 on Plavix QOD, AICD placed in 2011 then removed as it got infected, later had a right sided pacemaker placed in dec 2017 for bradycardia , Prostate CA, S/P surgery and RTX, Rt Nephrectomy for Rt Renal cell CA, Htn, HLD  copd, CKD ( baseline creatinine is 2.0) , Bradycardia (s/p Medtronic AICD), CKD S/P Colonoscopy in March 2015 c/w Diverticulosis , HX of Polyps removed, PVD ( s/p balloon left leg stent) came with complain of dyspnea and dry cough x 1 day. Patient states he is developing worsening shortness of breath since he was discharged yesterday.

## 2018-08-05 NOTE — ED PROVIDER NOTE - PROGRESS NOTE DETAILS
Pt with left base pleural effusion and possible PNA versus CHF. Will admit, informed covering doctor, Dr. Lyons and Dr. Stephenson Pt with left base pleural effusion and possible PNA versus CHF. Will admit, informed covering doctor, Dr. Lyons and Dr. Stephenson.

## 2018-08-05 NOTE — H&P ADULT - PROBLEM SELECTOR PLAN 5
-elevated pro-BNP ~7000  -s/p 1 dose lasix 40 IV  -will hold on further Lasix as patient looks dehydrated on examination  -ECHO recently in July 2018 showed EF >55% with mild Pulmonary HTN 44mmhg.  -cardiology consulted Dr Louie

## 2018-08-05 NOTE — ED ADULT NURSE NOTE - NSIMPLEMENTINTERV_GEN_ALL_ED
Implemented All Fall with Harm Risk Interventions:  [place] to call system. Call bell, personal items and telephone within reach. Instruct patient to call for assistance. Room bathroom lighting operational. Non-slip footwear when patient is off stretcher. Physically safe environment: no spills, clutter or unnecessary equipment. Stretcher in lowest position, wheels locked, appropriate side rails in place. Provide visual cue, wrist band, yellow gown, etc. Monitor gait and stability. Monitor for mental status changes and reorient to person, place, and time. Review medications for side effects contributing to fall risk. Reinforce activity limits and safety measures with patient and family. Provide visual clues: red socks.

## 2018-08-05 NOTE — H&P ADULT - PROBLEM SELECTOR PLAN 4
-SOB and wheezing on examination  -c/w Solumedrol 40 mg iv Q8 hrs   -c/w Supplemental O2   -c/w Duoneb Q6 hrs -SOB and wheezing on examination, cough, sputum production  -c/w Solumedrol 40 mg iv Q8 hrs   -c/w Supplemental O2   -c/w Duoneb Q6 hrs

## 2018-08-05 NOTE — ED PROVIDER NOTE - CARE PLAN
Principal Discharge DX:	Pleural effusion  Secondary Diagnosis:	Acute on chronic congestive heart failure, unspecified heart failure type

## 2018-08-05 NOTE — H&P ADULT - HISTORY OF PRESENT ILLNESS
Patient is a 82 y/o male from home, ambulates with a cane with PMHX of CAD, + Stent placed in 2009 on Plavix QOD, AICD placed in 2011 then removed as it got infected, later had a right sided pacemaker placed in dec 2017 for bradycardia , Prostate CA, S/P surgery and RTX, Rt Nephrectomy for Rt Renal cell CA, Htn, HLD  copd, CKD ( baseline creatinine is 2.0) , Bradycardia (s/p Medtronic AICD), CKD S/P Colonoscopy in March 2015 c/w Diverticulosis , HX of Polyps removed, PVD ( s/p balloon left leg stent) came with complain of dyspnea and dry cough x 1 day. Patient states he is developing worsening shortness of breath since he was discharged yesterday. He said he was doing fine till Friday when he walked with Physical therapy and was discharged to Home yesterday, but he has worsening cough and left sided pleuritic chest pain. His blurry vision is now improved though. He denies  fever, chills, nausea, vomiting, diarrhea, constipation or any other complains.     In ED, patient's vital signs were remarkable for BP of 181/74, HR of 89, Temp normal, Labs were significant for elevated wbc count of 12k with left shift, HB 10.6.  Cr: 2.89. EKG showed NSR, RBBB, No ST-T wave changes, paced rhythm. Received IV Lasix 40 x 1 in ED, Solumedrol 125mg x 1 and Duoneb. Patient had CT chest which shows The lung windows demonstrate volume loss of the left lung with mediastinal shift to the left. 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 The left lower lobe is entirely consolidated. There is apparent normal aeration of the left upper lobe indicating the occlusion of the left bronchus may have occurred recently.  Prominent lymph nodes are seen in the left paratracheal space. Patient is a 80 y/o male from home, ambulates with a cane with PMHX of CAD, + Stent placed in 2009 on Plavix QOD, AICD placed in 2011 then removed as it got infected, later had a right sided pacemaker placed in dec 2017 for bradycardia , Prostate CA, S/P surgery and RTX, Rt Nephrectomy for Rt Renal cell CA, Htn, HLD  copd, CKD ( baseline creatinine is 2.0) , Bradycardia (s/p Medtronic AICD), CKD S/P Colonoscopy in March 2015 c/w Diverticulosis , HX of Polyps removed, PVD ( s/p balloon left leg stent) came with complain of dyspnea and dry cough x 1 day. Patient states he is developing worsening shortness of breath since he was discharged yesterday. He was Rx at [facility] for CHF exacerbation, foreign body inhalation complicated by vocal cord inflammation. he was intubated and then self extubated himself in 24 hours. he was safely downgraded to floor and was rx with IV steroids. He said he was doing fine till Friday when he walked with Physical therapy and was discharged to Home yesterday, by the time he reached Home he had worsening cough and left sided pleuritic chest pain. His blurry vision is now improved though. Patient reported weight loss of 20 lbs in past couple of months. He denies  fever, chills, nausea, vomiting, diarrhea, constipation or any other complains.     In ED, patient's vital signs were remarkable for BP of 181/74, HR of 89, Temp normal, Labs were significant for elevated wbc count of 12k with left shift, HB 10.6.  Cr: 2.89. EKG showed NSR, RBBB, No ST-T wave changes, paced rhythm. Received IV Lasix 40 x 1 in ED, Solumedrol 125mg x 1 and Duoneb. Patient had CT chest which shows The lung windows demonstrate volume loss of the left lung with mediastinal shift to the left. 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 The left lower lobe is entirely consolidated. There is apparent normal aeration of the left upper lobe indicating the occlusion of the left bronchus may have occurred recently.  Prominent lymph nodes are seen in the left paratracheal space. off note: Patient sees pulmonologist Dr Belle who performed a CT chest 3 months ago, and saw a small lesion. Patient was informed that he might have some malignant lesion, but its too small to be biopsied and was advised to get follow up CT scans in futures.  Goals of Care: Goals of care were discussed at bed time . Patient wants to be full code at this point. Son witnessed conversations at bed side.

## 2018-08-05 NOTE — H&P ADULT - PROBLEM SELECTOR PLAN 3
-Likely secondary to uncontrolled HTN  -continue hold diuretics.   -Keep patient euvolemic and renal diet  -Avoid Nephrotoxic Meds/ Agents such as (NSAIDs, IV contrast, Aminoglycosides such as gentamicin, -Gadolinium contrast, Phosphate containing enemas, etc..)  -Adjust Medications according to eGFR  -f/u bmp daily  -c/w fluid restriction 1L daily, but will also avoid diuretics

## 2018-08-05 NOTE — ED ADULT NURSE NOTE - ED STAT RN HANDOFF DETAILS 2
Received from [facility] pt with Zosyn in progress not in any distress on monitor will continue to monitor. with Stage 2 pressure ulcer to  to left buttocks area. IV 20G to right arm

## 2018-08-05 NOTE — H&P ADULT - PROBLEM SELECTOR PLAN 7
RISK                                                          Points  [] Previous VTE                                           3  [] Thrombophilia                                        2  [] Lower limb paralysis                              2   [] Current Cancer                                       2   [x] Immobilization > 24 hrs                        1  [] ICU/CCU stay > 24 hours                       1  [x] Age > 60                                                   1    IMPROVE score 2 points. c/w DVT prophylaxis with Lovenox 30 sc

## 2018-08-05 NOTE — PATIENT PROFILE ADULT. - VISION (WITH CORRECTIVE LENSES IF THE PATIENT USUALLY WEARS THEM):
w/glasses/Partially impaired: cannot see medication labels or newsprint, but can see obstacles in path, and the surrounding layout; can count fingers at arm's length

## 2018-08-05 NOTE — ED PROVIDER NOTE - OBJECTIVE STATEMENT
84 y/o M pt with a PMHx of AICD, CAD, CKD, HTN, Kidney carcinoma, Pacemaker, Prostate cancer, and Stented coronary artery and a PSHx of a Hernia, and Prostatectomy presents to ED c/o SOB and CP today. Pt notes CLIFTON and an associated cough as well. Per pt, he was admitted to [facility] ED x1 week ago for an eye infection and was treated for SOB after a finding of water in his lungs. Pt states that he was just discharged from [facility] ED yesterday and prescribed Lasix. Pt reports that his sx recurred once he got home last night. Pt denies fever, chills or any other complaints. Allergies: Aspirin (anaphylaxis)

## 2018-08-05 NOTE — H&P ADULT - PROBLEM SELECTOR PROBLEM 1
ADMIT TO ROOM 2123 FROM ER. ACCOMPANIED BY SPOUSE AND SONS X 2.
ALERT/ORIENTED. NO CURRENT CHEST PAIN. TELEMETRY INITIATED. REVIEWED PLAN OF
CARE. ADMISSION HISTORY AND ASSESSMENT COMPLETED. HOME MEDS REVIEWED. CALL
LIGHT IN REACH. Consolidation lung

## 2018-08-05 NOTE — H&P ADULT - PROBLEM SELECTOR PLAN 1
-Patient presented with SOB and left sided pleuritic chest pain  -CT shows left lobe consolidation and occluded left main bronchus  -elevated wbc count to 12k with left shift, worsening cough and sputum production.  -will start on Zosyn Q12 hrs for now.   -f/u procalcitonin  - -Patient presented with SOB and left sided pleuritic chest pain  -CT shows left lobe consolidation and occluded left main bronchus  -elevated wbc count to 12k with left shift, worsening cough and sputum production.  -will start on Zosyn Q12 hrs for now.   -f/u procalcitonin  -Pulmonary consuled Dr em

## 2018-08-05 NOTE — ED ADULT NURSE NOTE - OBJECTIVE STATEMENT
pt a&ox3, here with c/o difficulty breathing and body aches. pt states he was discharged from the hospital yesterday for same complaint. denies fever, chills, n/v, sweats. pt a&ox3, here with c/o difficulty breathing and body aches. pt states he was discharged from the hospital yesterday for same complaint. denies fever, chills, n/v, sweats. pt with skin tear to left forearm after IV removal yesterday as per pt. dressing to left gluteal intact for stage 2 ulcer.

## 2018-08-05 NOTE — ED PROVIDER NOTE - MEDICAL DECISION MAKING DETAILS
86 y/o M pt just discharged yesterday, developed CP, worsened SOB and generalized weakness. Will check labs, CXR, and EKG.

## 2018-08-06 DIAGNOSIS — J15.9 UNSPECIFIED BACTERIAL PNEUMONIA: ICD-10-CM

## 2018-08-06 LAB
ALBUMIN SERPL ELPH-MCNC: 2.9 G/DL — LOW (ref 3.5–5)
ALP SERPL-CCNC: 60 U/L — SIGNIFICANT CHANGE UP (ref 40–120)
ALT FLD-CCNC: 12 U/L DA — SIGNIFICANT CHANGE UP (ref 10–60)
ANION GAP SERPL CALC-SCNC: 9 MMOL/L — SIGNIFICANT CHANGE UP (ref 5–17)
AST SERPL-CCNC: 10 U/L — SIGNIFICANT CHANGE UP (ref 10–40)
BASOPHILS # BLD AUTO: 0 K/UL — SIGNIFICANT CHANGE UP (ref 0–0.2)
BASOPHILS NFR BLD AUTO: 0.2 % — SIGNIFICANT CHANGE UP (ref 0–2)
BILIRUB SERPL-MCNC: 0.8 MG/DL — SIGNIFICANT CHANGE UP (ref 0.2–1.2)
BUN SERPL-MCNC: 71 MG/DL — HIGH (ref 7–18)
CALCIUM SERPL-MCNC: 9 MG/DL — SIGNIFICANT CHANGE UP (ref 8.4–10.5)
CHLORIDE SERPL-SCNC: 105 MMOL/L — SIGNIFICANT CHANGE UP (ref 96–108)
CO2 SERPL-SCNC: 28 MMOL/L — SIGNIFICANT CHANGE UP (ref 22–31)
CREAT SERPL-MCNC: 2.86 MG/DL — HIGH (ref 0.5–1.3)
EOSINOPHIL # BLD AUTO: 0 K/UL — SIGNIFICANT CHANGE UP (ref 0–0.5)
EOSINOPHIL NFR BLD AUTO: 0 % — SIGNIFICANT CHANGE UP (ref 0–6)
GLUCOSE SERPL-MCNC: 212 MG/DL — HIGH (ref 70–99)
HCT VFR BLD CALC: 28.2 % — LOW (ref 39–50)
HGB BLD-MCNC: 9.3 G/DL — LOW (ref 13–17)
LYMPHOCYTES # BLD AUTO: 0.5 K/UL — LOW (ref 1–3.3)
LYMPHOCYTES # BLD AUTO: 6.5 % — LOW (ref 13–44)
MAGNESIUM SERPL-MCNC: 2.5 MG/DL — SIGNIFICANT CHANGE UP (ref 1.6–2.6)
MCHC RBC-ENTMCNC: 29.3 PG — SIGNIFICANT CHANGE UP (ref 27–34)
MCHC RBC-ENTMCNC: 33.1 GM/DL — SIGNIFICANT CHANGE UP (ref 32–36)
MCV RBC AUTO: 88.6 FL — SIGNIFICANT CHANGE UP (ref 80–100)
MONOCYTES # BLD AUTO: 0.3 K/UL — SIGNIFICANT CHANGE UP (ref 0–0.9)
MONOCYTES NFR BLD AUTO: 4.7 % — SIGNIFICANT CHANGE UP (ref 2–14)
NEUTROPHILS # BLD AUTO: 6.4 K/UL — SIGNIFICANT CHANGE UP (ref 1.8–7.4)
NEUTROPHILS NFR BLD AUTO: 88.6 % — HIGH (ref 43–77)
PHOSPHATE SERPL-MCNC: 3.2 MG/DL — SIGNIFICANT CHANGE UP (ref 2.5–4.5)
PLATELET # BLD AUTO: 180 K/UL — SIGNIFICANT CHANGE UP (ref 150–400)
POTASSIUM SERPL-MCNC: 4.8 MMOL/L — SIGNIFICANT CHANGE UP (ref 3.5–5.3)
POTASSIUM SERPL-SCNC: 4.8 MMOL/L — SIGNIFICANT CHANGE UP (ref 3.5–5.3)
PROT SERPL-MCNC: 6.5 G/DL — SIGNIFICANT CHANGE UP (ref 6–8.3)
RBC # BLD: 3.18 M/UL — LOW (ref 4.2–5.8)
RBC # FLD: 12.9 % — SIGNIFICANT CHANGE UP (ref 10.3–14.5)
SODIUM SERPL-SCNC: 142 MMOL/L — SIGNIFICANT CHANGE UP (ref 135–145)
WBC # BLD: 7.3 K/UL — SIGNIFICANT CHANGE UP (ref 3.8–10.5)
WBC # FLD AUTO: 7.3 K/UL — SIGNIFICANT CHANGE UP (ref 3.8–10.5)

## 2018-08-06 RX ORDER — ACETYLCYSTEINE 200 MG/ML
3 VIAL (ML) MISCELLANEOUS EVERY 6 HOURS
Qty: 0 | Refills: 0 | Status: DISCONTINUED | OUTPATIENT
Start: 2018-08-06 | End: 2018-08-09

## 2018-08-06 RX ADMIN — Medication 3 MILLILITER(S): at 15:26

## 2018-08-06 RX ADMIN — SIMVASTATIN 10 MILLIGRAM(S): 20 TABLET, FILM COATED ORAL at 21:01

## 2018-08-06 RX ADMIN — Medication 25 MILLIGRAM(S): at 21:01

## 2018-08-06 RX ADMIN — Medication 50 MILLIGRAM(S): at 05:55

## 2018-08-06 RX ADMIN — PIPERACILLIN AND TAZOBACTAM 25 GRAM(S): 4; .5 INJECTION, POWDER, LYOPHILIZED, FOR SOLUTION INTRAVENOUS at 05:55

## 2018-08-06 RX ADMIN — Medication 3 MILLILITER(S): at 20:23

## 2018-08-06 RX ADMIN — ENOXAPARIN SODIUM 30 MILLIGRAM(S): 100 INJECTION SUBCUTANEOUS at 11:15

## 2018-08-06 RX ADMIN — Medication 40 MILLIGRAM(S): at 13:00

## 2018-08-06 RX ADMIN — Medication 25 MILLIGRAM(S): at 13:00

## 2018-08-06 RX ADMIN — Medication 3 MILLILITER(S): at 11:15

## 2018-08-06 RX ADMIN — Medication 0.3 MILLIGRAM(S): at 13:00

## 2018-08-06 RX ADMIN — CLOPIDOGREL BISULFATE 75 MILLIGRAM(S): 75 TABLET, FILM COATED ORAL at 11:14

## 2018-08-06 RX ADMIN — Medication 40 MILLIGRAM(S): at 21:00

## 2018-08-06 RX ADMIN — Medication 0.3 MILLIGRAM(S): at 21:01

## 2018-08-06 RX ADMIN — MONTELUKAST 10 MILLIGRAM(S): 4 TABLET, CHEWABLE ORAL at 11:14

## 2018-08-06 RX ADMIN — PANTOPRAZOLE SODIUM 40 MILLIGRAM(S): 20 TABLET, DELAYED RELEASE ORAL at 11:15

## 2018-08-06 RX ADMIN — Medication 3 MILLILITER(S): at 08:50

## 2018-08-06 RX ADMIN — Medication 40 MILLIGRAM(S): at 05:55

## 2018-08-06 RX ADMIN — Medication 25 MILLIGRAM(S): at 05:55

## 2018-08-06 RX ADMIN — Medication 3 MILLILITER(S): at 15:25

## 2018-08-06 RX ADMIN — PIPERACILLIN AND TAZOBACTAM 25 GRAM(S): 4; .5 INJECTION, POWDER, LYOPHILIZED, FOR SOLUTION INTRAVENOUS at 17:09

## 2018-08-06 RX ADMIN — Medication 0.3 MILLIGRAM(S): at 05:55

## 2018-08-06 NOTE — PROGRESS NOTE ADULT - SUBJECTIVE AND OBJECTIVE BOX
PGY1 Note discussed with Supervising Resident and Primary Attending.    Patient is a 85y old  Male who presents with a chief complaint of SOB (05 Aug 2018 17:46)      INTERVAL HPI/OVERNIGHT EVENTS :    MEDICATIONS  (STANDING):  acetylcysteine 20% Inhalation 3 milliLiter(s) Inhalation every 6 hours  ALBUTerol/ipratropium for Nebulization 3 milliLiter(s) Nebulizer every 6 hours  cloNIDine 0.3 milliGRAM(s) Oral three times a day  clopidogrel Tablet 75 milliGRAM(s) Oral daily  enoxaparin Injectable 30 milliGRAM(s) SubCutaneous daily  hydrALAZINE 25 milliGRAM(s) Oral every 8 hours  methylPREDNISolone sodium succinate Injectable 40 milliGRAM(s) IV Push every 8 hours  metoprolol succinate ER 50 milliGRAM(s) Oral daily  montelukast 10 milliGRAM(s) Oral daily  pantoprazole  Injectable 40 milliGRAM(s) IV Push daily  piperacillin/tazobactam IVPB. 3.375 Gram(s) IV Intermittent every 12 hours  simvastatin 10 milliGRAM(s) Oral at bedtime    MEDICATIONS  (PRN):  oxyCODONE    5 mG/acetaminophen 325 mG 1 Tablet(s) Oral every 6 hours PRN Severe Pain (7 - 10)      Allergies    aspirin (Anaphylaxis)    Intolerances        REVIEW OF SYSTEMS :  * CONSTITUTIONAL      : No Fever, Weight loss, or Fatigue  * EYES                             : No eye pain , Visual disturbances or Discharge  * RESPIRATORY             : No Cough, Wheezing, Chills or Hemoptysis; No shortness of breath  * CARDIOVASCULAR     : No Chest pain, Palpitations, Dizziness, or Leg swelling  * GASTROINTESTINAL  : No Abdominal or Epigastric pain. No Nausea, Vomiting or Hematemesis; No Diarrhea or Constipation. No Melena or Hematochezia.  * GENITOURINARY        : No Dysuria , Frequency , Haematuria   * NEUROLOGICAL          : No Headaches, Memory loss, Loss of trength, Numbness, or Tremors  * MUSCULOSKELETAL   : No Joint pain  * PSYCHIATRY                 : No Depression or Anxiety   * HEME/LYMPH              : No Easy Bruising or Bleeding gums  * SKIN                               : No Itching, Burning, Rashes, or Lesions     Vital Signs Last 24 Hrs  T(C): 36.8 (06 Aug 2018 05:09), Max: 36.9 (05 Aug 2018 18:53)  T(F): 98.2 (06 Aug 2018 05:09), Max: 98.5 (05 Aug 2018 18:53)  HR: 73 (06 Aug 2018 05:09) (73 - 90)  BP: 144/59 (06 Aug 2018 05:09) (144/59 - 184/83)  BP(mean): --  RR: 18 (06 Aug 2018 05:09) (16 - 26)  SpO2: 97% (06 Aug 2018 05:09) (97% - 99%)    PHYSICAL EXAM :  * GENERAL                 : NAD, Well-groomed, Well-developed  * HEAD                       :  Atraumatic, Normocephalic  * EYES                         : EOMI, PERRLA, Conjunctiva and Sclera clear  * ENT                           : Moist Mucous Membranes  * NECK                         : Supple, No JVD, Normal Thyroid  * CHEST/LUNG           : Clear to Auscultation bilaterally; No Rales, Rhonchi, Wheezing or Rubs  * HEART                       : Regular Rate and Rhythm; No murmurs, Rubs or gallops  * ABDOMEN                : Soft, Non-tender, Non-distended; Bowel Sounds present  * NERVOUS SYSTEM  :  Alert & Oriented X3, Good Concentration; Motor Strength 5/5 B/L UL LL ; DTRs 2+ Intact and Symmetric  * EXTREMITIES            :  2+ Peripheral Pulses, No clubbing, cyanosis, or edema  * SKIN                           : No Rashes or Lesions    LABS:                          10.6   12.0  )-----------( 251      ( 05 Aug 2018 13:00 )             32.1     08-05    140  |  102  |  73<H>  ----------------------------<  138<H>  4.0   |  27  |  2.89<H>    Ca    9.3      05 Aug 2018 13:00    TPro  7.4  /  Alb  3.6  /  TBili  1.1  /  DBili  x   /  AST  14  /  ALT  17  /  AlkPhos  72  08-05    PT/INR - ( 05 Aug 2018 13:00 )   PT: 11.3 sec;   INR: 1.04 ratio         PTT - ( 05 Aug 2018 13:00 )  PTT:27.3 sec    CAPILLARY BLOOD GLUCOSE          RADIOLOGY & ADDITIONAL TESTS:   No radiological imaging was required    Imaging Personally Reviewed   :  [ ] YES  [ ] NO    Consultant(s) Notes Reviewed :  [ ] YES  [ ] NO PGY1 Note discussed with Supervising Resident and Primary Attending.    Patient is a 85y old  Male who presents with a chief complaint of SOB (05 Aug 2018 17:46)      INTERVAL HPI/OVERNIGHT EVENTS : diminished breathing sounds on left side , non productive cough     MEDICATIONS  (STANDING):  acetylcysteine 20% Inhalation 3 milliLiter(s) Inhalation every 6 hours  ALBUTerol/ipratropium for Nebulization 3 milliLiter(s) Nebulizer every 6 hours  cloNIDine 0.3 milliGRAM(s) Oral three times a day  clopidogrel Tablet 75 milliGRAM(s) Oral daily  enoxaparin Injectable 30 milliGRAM(s) SubCutaneous daily  hydrALAZINE 25 milliGRAM(s) Oral every 8 hours  methylPREDNISolone sodium succinate Injectable 40 milliGRAM(s) IV Push every 8 hours  metoprolol succinate ER 50 milliGRAM(s) Oral daily  montelukast 10 milliGRAM(s) Oral daily  pantoprazole  Injectable 40 milliGRAM(s) IV Push daily  piperacillin/tazobactam IVPB. 3.375 Gram(s) IV Intermittent every 12 hours  simvastatin 10 milliGRAM(s) Oral at bedtime    MEDICATIONS  (PRN):  oxyCODONE    5 mG/acetaminophen 325 mG 1 Tablet(s) Oral every 6 hours PRN Severe Pain (7 - 10)      Allergies    aspirin (Anaphylaxis)    Intolerances        REVIEW OF SYSTEMS :  * CONSTITUTIONAL      : No Fever, Weight loss, or Fatigue  * EYES                             : No eye pain , Visual disturbances or Discharge  * RESPIRATORY             : No Cough, Wheezing, Chills or Hemoptysis; No shortness of breath  * CARDIOVASCULAR     : No Chest pain, Palpitations, Dizziness, or Leg swelling  * GASTROINTESTINAL  : No Abdominal or Epigastric pain. No Nausea, Vomiting or Hematemesis; No Diarrhea or Constipation. No Melena or Hematochezia.  * GENITOURINARY        : No Dysuria , Frequency , Haematuria   * NEUROLOGICAL          : No Headaches, Memory loss, Loss of trength, Numbness, or Tremors  * MUSCULOSKELETAL   : No Joint pain  * PSYCHIATRY                 : No Depression or Anxiety   * HEME/LYMPH              : No Easy Bruising or Bleeding gums  * SKIN                               : No Itching, Burning, Rashes, or Lesions     Vital Signs Last 24 Hrs  T(C): 36.8 (06 Aug 2018 05:09), Max: 36.9 (05 Aug 2018 18:53)  T(F): 98.2 (06 Aug 2018 05:09), Max: 98.5 (05 Aug 2018 18:53)  HR: 73 (06 Aug 2018 05:09) (73 - 90)  BP: 144/59 (06 Aug 2018 05:09) (144/59 - 184/83)  BP(mean): --  RR: 18 (06 Aug 2018 05:09) (16 - 26)  SpO2: 97% (06 Aug 2018 05:09) (97% - 99%)    PHYSICAL EXAM :  * GENERAL                 : NAD, Well-groomed, Well-developed  * HEAD                       :  Atraumatic, Normocephalic  * EYES                         : EOMI, PERRLA, Conjunctiva and Sclera clear  * ENT                           : Moist Mucous Membranes  * NECK                         : Supple, No JVD, Normal Thyroid  * CHEST/LUNG           : Clear to Auscultation bilaterally; No Rales, Rhonchi, Wheezing or Rubs  * HEART                       : Regular Rate and Rhythm; No murmurs, Rubs or gallops  * ABDOMEN                : Soft, Non-tender, Non-distended; Bowel Sounds present  * NERVOUS SYSTEM  :  Alert & Oriented X3, Good Concentration; Motor Strength 5/5 B/L UL LL ; DTRs 2+ Intact and Symmetric  * EXTREMITIES            :  2+ Peripheral Pulses, No clubbing, cyanosis, or edema  * SKIN                           : No Rashes or Lesions    LABS:                          10.6   12.0  )-----------( 251      ( 05 Aug 2018 13:00 )             32.1     08-05    140  |  102  |  73<H>  ----------------------------<  138<H>  4.0   |  27  |  2.89<H>    Ca    9.3      05 Aug 2018 13:00    TPro  7.4  /  Alb  3.6  /  TBili  1.1  /  DBili  x   /  AST  14  /  ALT  17  /  AlkPhos  72  08-05    PT/INR - ( 05 Aug 2018 13:00 )   PT: 11.3 sec;   INR: 1.04 ratio         PTT - ( 05 Aug 2018 13:00 )  PTT:27.3 sec    CAPILLARY BLOOD GLUCOSE          RADIOLOGY & ADDITIONAL TESTS:   No radiological imaging was required    Imaging Personally Reviewed   :  [ ] YES  [ ] NO    Consultant(s) Notes Reviewed :  [ ] YES  [ ] NO

## 2018-08-06 NOTE — PROGRESS NOTE ADULT - PROBLEM SELECTOR PLAN 1
-Patient presented with SOB and left sided pleuritic chest pain  -CT shows left lobe consolidation and occluded left main bronchus  -elevated wbc count to 12k with left shift, worsening cough and sputum production.  -will start on Zosyn Q12 hrs for now.   -f/u procalcitonin  -Pulmonary consuled Dr em

## 2018-08-06 NOTE — PROGRESS NOTE ADULT - PROBLEM SELECTOR PLAN 3
-Likely secondary to uncontrolled HTN  -continue hold diuretics.   -Keep patient euvolemic and renal diet  -Avoid Nephrotoxic Meds/ Agents such as (NSAIDs, IV contrast, Aminoglycosides such as gentamicin, -Gadolinium contrast, Phosphate containing enemas, etc..)  -Adjust Medications according to eGFR  -f/u bmp daily  -c/w fluid restriction 1L daily, but will also avoid diuretics -Likely secondary to uncontrolled HTN  -continue hold diuretics.   -Keep patient euvolemic and renal diet  -Avoid Nephrotoxic Meds/ Agents such as (NSAIDs, IV contrast, Aminoglycosides such as gentamicin, -Gadolinium contrast, Phosphate containing enemas, etc..)  -Adjust Medications according to eGFR  -f/u bmp daily  -c/w fluid restriction 1L daily, avoid diuretics

## 2018-08-06 NOTE — PROGRESS NOTE ADULT - PROBLEM SELECTOR PLAN 3
-Likely secondary to uncontrolled HTN  -continue hold diuretics.   -Keep patient euvolemic and renal diet  -Avoid Nephrotoxic Meds/ Agents such as (NSAIDs, IV contrast, Aminoglycosides such as gentamicin, -Gadolinium contrast, Phosphate containing enemas, etc..)  -Adjust Medications according to eGFR  -f/u bmp daily  -c/w fluid restriction 1L daily, avoid diuretics

## 2018-08-06 NOTE — CONSULT NOTE ADULT - SUBJECTIVE AND OBJECTIVE BOX
Time of visit:    CHIEF COMPLAINT: Patient is a 85y old  Male who presents with a chief complaint of SOB (05 Aug 2018 17:46)      HPI:  Patient is a 80 y/o male from home, ambulates with a cane with PMHX of CAD, + Stent placed in 2009 on Plavix QOD, AICD placed in 2011 then removed as it got infected, later had a right sided pacemaker placed in dec 2017 for bradycardia , Prostate CA, S/P surgery and RTX, Rt Nephrectomy for Rt Renal cell CA, Htn, HLD  copd, CKD ( baseline creatinine is 2.0) , Bradycardia (s/p Medtronic AICD), CKD S/P Colonoscopy in March 2015 c/w Diverticulosis , HX of Polyps removed, PVD ( s/p balloon left leg stent) came with complain of dyspnea and dry cough x 1 day. Patient states he is developing worsening shortness of breath since he was discharged yesterday. 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 he was intubated and then self extubated himself in 24 hours. he was safely downgraded to floor and was rx with IV steroids. He said he was doing fine till Friday when he walked with Physical therapy and was discharged to Home yesterday, by the time he reached Home he had worsening cough and left sided pleuritic chest pain. His blurry vision is now improved though. Patient reported weight loss of 20 lbs in past couple of months. He denies  fever, chills, nausea, vomiting, diarrhea, constipation or any other complains.     In ED, patient's vital signs were remarkable for BP of 181/74, HR of 89, Temp normal, Labs were significant for elevated wbc count of 12k with left shift, HB 10.6.  Cr: 2.89. EKG showed NSR, RBBB, No ST-T wave changes, paced rhythm. Received IV Lasix 40 x 1 in ED, Solumedrol 125mg x 1 and Duoneb. Patient had CT chest which shows The lung windows demonstrate volume loss of the left lung with mediastinal shift to the left. 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 The left lower lobe is entirely consolidated. There is apparent normal aeration of the left upper lobe indicating the occlusion of the left bronchus may have occurred recently.  Prominent lymph nodes are seen in the left paratracheal space. off note: Patient sees pulmonologist Dr Belle who performed a CT chest 3 months ago, and saw a small lesion. Patient was informed that he might have some malignant lesion, but its too small to be biopsied and was advised to get follow up CT scans in futures.  Goals of Care: Goals of care were discussed at bed time . Patient wants to be full code at this point. Son witnessed conversations at bed side. (05 Aug 2018 17:46)   Patient seen and examined.     PAST MEDICAL & SURGICAL HISTORY:  Stented coronary artery  Pacemaker  CKD (chronic kidney disease)  Hypertension  AICD (automatic cardioverter/defibrillator) present  CAD (coronary artery disease)  Kidney carcinoma: s/p right nephrectomy  Prostate cancer  Kidney carcinoma: s/p right nephrectomy  Hernia: Hernioplasty  Prostate: prostatectomy      Allergies    aspirin (Anaphylaxis)    Intolerances        MEDICATIONS  (STANDING):  acetylcysteine 20% Inhalation 3 milliLiter(s) Inhalation every 6 hours  ALBUTerol/ipratropium for Nebulization 3 milliLiter(s) Nebulizer every 6 hours  cloNIDine 0.3 milliGRAM(s) Oral three times a day  clopidogrel Tablet 75 milliGRAM(s) Oral daily  enoxaparin Injectable 30 milliGRAM(s) SubCutaneous daily  hydrALAZINE 25 milliGRAM(s) Oral every 8 hours  methylPREDNISolone sodium succinate Injectable 40 milliGRAM(s) IV Push every 8 hours  metoprolol succinate ER 50 milliGRAM(s) Oral daily  montelukast 10 milliGRAM(s) Oral daily  pantoprazole  Injectable 40 milliGRAM(s) IV Push daily  piperacillin/tazobactam IVPB. 3.375 Gram(s) IV Intermittent every 12 hours  simvastatin 10 milliGRAM(s) Oral at bedtime      MEDICATIONS  (PRN):  oxyCODONE    5 mG/acetaminophen 325 mG 1 Tablet(s) Oral every 6 hours PRN Severe Pain (7 - 10)       Medications up to date at time of exam.    FAMILY HISTORY:  No pertinent family history in first degree relatives      SOCIAL HISTORY  Smoking History: [   ] smoking/smoke exposure, [   ] former smoker  Living Condition: [   ] apartment, [   ] private house  Work History:   Travel History: denies recent travel  Illicit Substance Use: denies  Alcohol Use: denies    REVIEW OF SYSTEMS:    CONSTITUTIONAL:  denies fevers, chills, sweats, weight loss    HEENT:  denies diplopia or blurred vision, sore throat or runny nose.    CARDIOVASCULAR:  denies pressure, squeezing, tightness, or heaviness about the chest; no palpitations.    RESPIRATORY:  denies SOB, cough, CLIFTON, wheezing.    GASTROINTESTINAL:  denies abdominal pain, nausea, vomiting or diarrhea.    GENITOURINARY: denies dysuria, frequency or urgency.    NEUROLOGIC:  denies numbness, tingling, seizures or weakness.    PSYCHIATRIC:  denies disorder of thought or mood.    MSK: denies swelling, redness      PHYSICAL EXAMINATION:    GENERAL: The patient is a well-developed, well-nourished, in no apparent distress.     Vital Signs Last 24 Hrs  T(C): 37.1 (06 Aug 2018 08:03), Max: 37.1 (06 Aug 2018 08:03)  T(F): 98.7 (06 Aug 2018 08:03), Max: 98.7 (06 Aug 2018 08:03)  HR: 63 (06 Aug 2018 08:03) (63 - 90)  BP: 111/72 (06 Aug 2018 08:03) (111/72 - 184/83)  BP(mean): --  RR: 18 (06 Aug 2018 08:03) (16 - 26)  SpO2: 97% (06 Aug 2018 08:03) (97% - 99%)    HEENT: head is normocephalic and atraumatic. mucous membranes are moist.     NECK: supple, no palpable adenopathy.    LUNGS: bilateral scattered rhonchi    HEART: regular rate and rhythm II/VI    ABDOMEN: soft, nontender, and nondistended.     EXTREMITIES: without any cyanosis, clubbing, rash, lesions or edema.    NEUROLOGIC: awake, alert, oriented.     SKIN: warm, dry, good turgor.      LABS:                        10.6   12.0  )-----------( 251      ( 05 Aug 2018 13:00 )             32.1     08-05    140  |  102  |  73<H>  ----------------------------<  138<H>  4.0   |  27  |  2.89<H>    Ca    9.3      05 Aug 2018 13:00    TPro  7.4  /  Alb  3.6  /  TBili  1.1  /  DBili  x   /  AST  14  /  ALT  17  /  AlkPhos  72  08-05    PT/INR - ( 05 Aug 2018 13:00 )   PT: 11.3 sec;   INR: 1.04 ratio         PTT - ( 05 Aug 2018 13:00 )  PTT:27.3 sec      CARDIAC MARKERS ( 05 Aug 2018 13:00 )  0.033 ng/mL / x     / 20 U/L / x     / x            Serum Pro-Brain Natriuretic Peptide: 7116 pg/mL (08-05-18 @ 13:00)          Troponin 0.033 08-05 @ 13:00  CK 20 08-05 @ 13:00  CKMB -- 08-05 @ 13:00  .    MICROBIOLOGY: (if applicable)    RADIOLOGY & ADDITIONAL STUDIES:  EKG:   CXR:  ECHO:  TELE:    IMPRESSION: 85y Male PAST MEDICAL & SURGICAL HISTORY:  Stented coronary artery  Pacemaker  CKD (chronic kidney disease)  Hypertension  AICD (automatic cardioverter/defibrillator) present  CAD (coronary artery disease)  Kidney carcinoma: s/p right nephrectomy  Prostate cancer  Kidney carcinoma: s/p right nephrectomy  Hernia: Hernioplasty  Prostate: prostatectomy   p/w   Patient is a 80 y/o male from home, ambulates with a cane with PMHX of CAD, + Stent placed in 2009 on Plavix QOD, AICD placed in 2011 then removed as it got infected, later had a right sided pacemaker placed in dec 2017 for bradycardia , Prostate CA, S/P surgery and RTX, Rt Nephrectomy for Rt Renal cell CA, Htn, HLD  copd, CKD ( baseline creatinine is 2.0) , Bradycardia (s/p Medtronic AICD), CKD S/P Colonoscopy in March 2015 c/w Diverticulosis , HX of Polyps removed, PVD ( s/p balloon left leg stent) came with complain of dyspnea and dry cough x 1 day. Patient states he is developing worsening shortness of breath since he was discharged yesterday. 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 he was intubated and then self extubated himself in 24 hours. he was safely downgraded to floor and was rx with IV steroids. He said he was doing fine till Friday when he walked with Physical therapy and was discharged to Home yesterday, by the time he reached Home he had worsening cough and left sided pleuritic chest pain. His blurry vision is now improved though. Patient reported weight loss of 20 lbs in past couple of months. He denies  fever, chills, nausea, vomiting, diarrhea, constipation or any other complains.     In ED, patient's vital signs were remarkable for BP of 181/74, HR of 89, Temp normal, Labs were significant for elevated wbc count of 12k with left shift, HB 10.6.  Cr: 2.89. EKG showed NSR, RBBB, No ST-T wave changes, paced rhythm. Received IV Lasix 40 x 1 in ED, Solumedrol 125mg x 1 and Duoneb. Patient had CT chest which shows The lung windows demonstrate volume loss of the left lung with mediastinal shift to the left. 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 The left lower lobe is entirely consolidated. There is apparent normal aeration of the left upper lobe indicating the occlusion of the left bronchus may have occurred recently.  Prominent lymph nodes are seen in the left paratracheal space. off note: Patient sees pulmonologist Dr Belle who performed a CT chest 3 months ago, and saw a small lesion. Patient was informed that he might have some malignant lesion, but its too small to be biopsied and was advised to get follow up CT scans in futures.    RECOMMENDATIONS:  will schedule patient for nuclear stress test   depending on findings may need further workup

## 2018-08-06 NOTE — PROGRESS NOTE ADULT - PROBLEM SELECTOR PLAN 5
-elevated pro-BNP ~7000  -s/p 1 dose lasix 40 IV  -will hold on further Lasix as patient looks dehydrated on examination  -ECHO recently in July 2018 showed EF >55% with mild Pulmonary HTN 44mmhg.  -cardiology consulted Dr Louie -elevated pro-BNP ~7000  -s/p 1 dose lasix 40 IV  -hold on further Lasix as patient looks dehydrated on examination  -ECHO recently in July 2018 showed EF >55% with mild Pulmonary HTN 44mmhg.  -cardiology consulted Dr Louie

## 2018-08-06 NOTE — PROGRESS NOTE ADULT - ASSESSMENT
Patient is a 82 y/o male from home, ambulates with a cane with PMHX of CAD, + Stent placed in 2009 on Plavix QOD, AICD placed in 2011 then removed as it got infected, later had a right sided pacemaker placed in dec 2017 for bradycardia , Prostate CA, S/P surgery and RTX, Rt Nephrectomy for Rt Renal cell CA, Htn, HLD  copd, CKD ( baseline creatinine is 2.0) , Bradycardia (s/p Medtronic AICD), CKD S/P Colonoscopy in March 2015 c/w Diverticulosis , HX of Polyps removed, PVD ( s/p balloon left leg stent) came with complain of dyspnea and dry cough x 1 day. Patient states he is developing worsening shortness of breath since he was discharged yesterday.

## 2018-08-06 NOTE — PROGRESS NOTE ADULT - SUBJECTIVE AND OBJECTIVE BOX
Patient is a 82 y/o male from home, ambulates with a cane with PMHX of CAD, + Stent placed in 2009 on Plavix QOD, AICD placed in 2011 then removed as it got infected, later had a right sided pacemaker placed in dec 2017 for bradycardia , Prostate CA, S/P surgery and RTX, Rt Nephrectomy for Rt Renal cell CA, Htn, HLD  copd, CKD ( baseline creatinine is 2.0) , Bradycardia (s/p Medtronic AICD), CKD S/P Colonoscopy in March 2015 c/w Diverticulosis , HX of Polyps removed, PVD ( s/p balloon left leg stent) came with complain of dyspnea and dry cough x 1 day. Patient states he is developing worsening shortness of breath since he was discharged yesterday. He was Rx at [facility] for CHF exacerbation, foreign body inhalation complicated by vocal cord inflammation. he was intubated and then self extubated himself in 24 hours. he was safely downgraded to floor and was rx with IV steroids. He said he was doing fine till Friday when he walked with Physical therapy and was discharged to Home yesterday, by the time he reached Home he had worsening cough and left sided pleuritic chest pain. His blurry vision is now improved though. Patient reported weight loss of 20 lbs in past couple of months. He denies  fever, chills, nausea, vomiting, diarrhea, constipation or any other complains.     In ED, patient's vital signs were remarkable for BP of 181/74, HR of 89, Temp normal, Labs were significant for elevated wbc count of 12k with left shift, HB 10.6.  Cr: 2.89. EKG showed NSR, RBBB, No ST-T wave changes, paced rhythm. Received IV Lasix 40 x 1 in ED, Solumedrol 125mg x 1 and Duoneb. Patient had CT chest which shows The lung windows demonstrate volume loss of the left lung with mediastinal shift to the left. 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 The left lower lobe is entirely consolidated. There is apparent normal aeration of the left upper lobe indicating the occlusion of the left bronchus may have occurred recently.  Prominent lymph nodes are seen in the left paratracheal space. off note: Patient sees pulmonologist Dr Belle who performed a CT chest 3 months ago, and saw a small lesion. Patient was informed that he might have some malignant lesion, but its too small to be biopsied and was advised to get follow up CT scans in futures.  Goals of Care: Goals of care were discussed at bed time . Patient wants to be full code at this point. Son witnessed conversations at bed side.              Review of Systems:  Other Review of Systems: All other review of systems negative, except as noted in HPI	      pt seen in tele [ x ], reg med floor [   ], bed [ x ], chair at bedside [   ], a+o x3 [ x ], lethargic [  ],  nad [ x ]        Allergies    aspirin (Anaphylaxis)        Vitals    T(F): 98.2 (08-06-18 @ 05:09), Max: 98.5 (08-05-18 @ 18:53)  HR: 73 (08-06-18 @ 05:09) (73 - 90)  BP: 144/59 (08-06-18 @ 05:09) (144/59 - 184/83)  RR: 18 (08-06-18 @ 05:09) (16 - 26)  SpO2: 97% (08-06-18 @ 05:09) (97% - 99%)  Wt(kg): --  CAPILLARY BLOOD GLUCOSE      POCT Blood Glucose.: 187 mg/dL (04 Aug 2018 12:00)      Labs                          10.6   12.0  )-----------( 251      ( 05 Aug 2018 13:00 )             32.1       08-05    140  |  102  |  73<H>  ----------------------------<  138<H>  4.0   |  27  |  2.89<H>    Ca    9.3      05 Aug 2018 13:00    TPro  7.4  /  Alb  3.6  /  TBili  1.1  /  DBili  x   /  AST  14  /  ALT  17  /  AlkPhos  72  08-05      CARDIAC MARKERS ( 05 Aug 2018 13:00 )  0.033 ng/mL / x     / 20 U/L / x     / x                Radiology Results      Meds    MEDICATIONS  (STANDING):  acetylcysteine 20% Inhalation 3 milliLiter(s) Inhalation every 6 hours  ALBUTerol/ipratropium for Nebulization 3 milliLiter(s) Nebulizer every 6 hours  cloNIDine 0.3 milliGRAM(s) Oral three times a day  clopidogrel Tablet 75 milliGRAM(s) Oral daily  enoxaparin Injectable 30 milliGRAM(s) SubCutaneous daily  hydrALAZINE 25 milliGRAM(s) Oral every 8 hours  methylPREDNISolone sodium succinate Injectable 40 milliGRAM(s) IV Push every 8 hours  metoprolol succinate ER 50 milliGRAM(s) Oral daily  montelukast 10 milliGRAM(s) Oral daily  pantoprazole  Injectable 40 milliGRAM(s) IV Push daily  piperacillin/tazobactam IVPB. 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  Pleural effusion, not elsewhere classified  h/o Stented coronary artery  Pacemaker  CKD (chronic kidney disease)  Hypertension  AICD (automatic cardioverter/defibrillator) present  CAD (coronary artery disease)  Kidney carcinoma  Prostate cancer  Kidney carcinoma  Hernia  Prostate      PLAN    cont zosyn  id cons  f/u blood cx  pulm cons  cont bronchodilators  add mucomyst nebs q6  cont supplement O2  cardio cons   hold lasix for now  cont current meds Patient is a 82 y/o male from home, ambulates with a cane with PMHX of CAD, + Stent placed in 2009 on Plavix QOD, AICD placed in 2011 then removed as it got infected, later had a right sided pacemaker placed in dec 2017 for bradycardia , Prostate CA, S/P surgery and RTX, Rt Nephrectomy for Rt Renal cell CA, Htn, HLD  copd, CKD ( baseline creatinine is 2.0) , Bradycardia (s/p Medtronic AICD), CKD S/P Colonoscopy in March 2015 c/w Diverticulosis , HX of Polyps removed, PVD ( s/p balloon left leg stent) came with complain of dyspnea and dry cough x 1 day. Patient states he is developing worsening shortness of breath since he was discharged yesterday. He was Rx at [facility] for CHF exacerbation, foreign body inhalation complicated by vocal cord inflammation. he was intubated and then self extubated himself in 24 hours. he was safely downgraded to floor and was rx with IV steroids. He said he was doing fine till Friday when he walked with Physical therapy and was discharged to Home yesterday, by the time he reached Home he had worsening cough and left sided pleuritic chest pain. His blurry vision is now improved though. Patient reported weight loss of 20 lbs in past couple of months. He denies  fever, chills, nausea, vomiting, diarrhea, constipation or any other complains.     In ED, patient's vital signs were remarkable for BP of 181/74, HR of 89, Temp normal, Labs were significant for elevated wbc count of 12k with left shift, HB 10.6.  Cr: 2.89. EKG showed NSR, RBBB, No ST-T wave changes, paced rhythm. Received IV Lasix 40 x 1 in ED, Solumedrol 125mg x 1 and Duoneb. Patient had CT chest which shows The lung windows demonstrate volume loss of the left lung with mediastinal shift to the left. 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 The left lower lobe is entirely consolidated. There is apparent normal aeration of the left upper lobe indicating the occlusion of the left bronchus may have occurred recently.  Prominent lymph nodes are seen in the left paratracheal space. off note: Patient sees pulmonologist Dr Belle who performed a CT chest 3 months ago, and saw a small lesion. Patient was informed that he might have some malignant lesion, but its too small to be biopsied and was advised to get follow up CT scans in futures.  Goals of Care: Goals of care were discussed at bed time . Patient wants to be full code at this point. Son witnessed conversations at bed side.              Review of Systems:  Other Review of Systems: All other review of systems negative, except as noted in HPI	      pt seen in tele [ x ], reg med floor [   ], bed [ x ], chair at bedside [   ], a+o x3 [ x ], lethargic [  ],  nad [ x ]        Allergies    aspirin (Anaphylaxis)        Vitals    T(F): 98.2 (08-06-18 @ 05:09), Max: 98.5 (08-05-18 @ 18:53)  HR: 73 (08-06-18 @ 05:09) (73 - 90)  BP: 144/59 (08-06-18 @ 05:09) (144/59 - 184/83)  RR: 18 (08-06-18 @ 05:09) (16 - 26)  SpO2: 97% (08-06-18 @ 05:09) (97% - 99%)  Wt(kg): --  CAPILLARY BLOOD GLUCOSE      POCT Blood Glucose.: 187 mg/dL (04 Aug 2018 12:00)      Labs                          10.6   12.0  )-----------( 251      ( 05 Aug 2018 13:00 )             32.1       08-05    140  |  102  |  73<H>  ----------------------------<  138<H>  4.0   |  27  |  2.89<H>    Ca    9.3      05 Aug 2018 13:00    TPro  7.4  /  Alb  3.6  /  TBili  1.1  /  DBili  x   /  AST  14  /  ALT  17  /  AlkPhos  72  08-05      CARDIAC MARKERS ( 05 Aug 2018 13:00 )  0.033 ng/mL / x     / 20 U/L / x     / x                Radiology Results      Meds    MEDICATIONS  (STANDING):  acetylcysteine 20% Inhalation 3 milliLiter(s) Inhalation every 6 hours  ALBUTerol/ipratropium for Nebulization 3 milliLiter(s) Nebulizer every 6 hours  cloNIDine 0.3 milliGRAM(s) Oral three times a day  clopidogrel Tablet 75 milliGRAM(s) Oral daily  enoxaparin Injectable 30 milliGRAM(s) SubCutaneous daily  hydrALAZINE 25 milliGRAM(s) Oral every 8 hours  methylPREDNISolone sodium succinate Injectable 40 milliGRAM(s) IV Push every 8 hours  metoprolol succinate ER 50 milliGRAM(s) Oral daily  montelukast 10 milliGRAM(s) Oral daily  pantoprazole  Injectable 40 milliGRAM(s) IV Push daily  piperacillin/tazobactam IVPB. 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    Patient is a 85y old  Male who presents with a chief complaint of SOB (05 Aug 2018 17:46)      INTERVAL HPI/OVERNIGHT EVENTS : pt is seen at the bedside. he has diminished breathing on elft side with non productive cough    MEDICATIONS  (STANDING):  acetylcysteine 20% Inhalation 3 milliLiter(s) Inhalation every 6 hours  ALBUTerol/ipratropium for Nebulization 3 milliLiter(s) Nebulizer every 6 hours  cloNIDine 0.3 milliGRAM(s) Oral three times a day  clopidogrel Tablet 75 milliGRAM(s) Oral daily  enoxaparin Injectable 30 milliGRAM(s) SubCutaneous daily  hydrALAZINE 25 milliGRAM(s) Oral every 8 hours  methylPREDNISolone sodium succinate Injectable 40 milliGRAM(s) IV Push every 8 hours  metoprolol succinate ER 50 milliGRAM(s) Oral daily  montelukast 10 milliGRAM(s) Oral daily  pantoprazole  Injectable 40 milliGRAM(s) IV Push daily  piperacillin/tazobactam IVPB. 3.375 Gram(s) IV Intermittent every 12 hours  simvastatin 10 milliGRAM(s) Oral at bedtime    MEDICATIONS  (PRN):  oxyCODONE    5 mG/acetaminophen 325 mG 1 Tablet(s) Oral every 6 hours PRN Severe Pain (7 - 10)      Allergies    aspirin (Anaphylaxis)    Intolerances        REVIEW OF SYSTEMS :  * CONSTITUTIONAL      : No Fever, Weight loss, or Fatigue  * EYES                             : No eye pain , Visual disturbances or Discharge  * RESPIRATORY             : No Cough, Wheezing, Chills or Hemoptysis; No shortness of breath  * CARDIOVASCULAR     : No Chest pain, Palpitations, Dizziness, or Leg swelling  * GASTROINTESTINAL  : No Abdominal or Epigastric pain. No Nausea, Vomiting or Hematemesis; No Diarrhea or Constipation. No Melena or Hematochezia.  * GENITOURINARY        : No Dysuria , Frequency , Haematuria   * NEUROLOGICAL          : No Headaches, Memory loss, Loss of trength, Numbness, or Tremors  * MUSCULOSKELETAL   : No Joint pain  * PSYCHIATRY                 : No Depression or Anxiety   * HEME/LYMPH              : No Easy Bruising or Bleeding gums  * SKIN                               : No Itching, Burning, Rashes, or Lesions     Vital Signs Last 24 Hrs  T(C): 37.1 (06 Aug 2018 08:03), Max: 37.1 (06 Aug 2018 08:03)  T(F): 98.7 (06 Aug 2018 08:03), Max: 98.7 (06 Aug 2018 08:03)  HR: 64 (06 Aug 2018 12:53) (63 - 90)  BP: 128/46 (06 Aug 2018 12:53) (111/72 - 184/83)  BP(mean): --  RR: 18 (06 Aug 2018 08:03) (16 - 26)  SpO2: 97% (06 Aug 2018 08:03) (97% - 99%)    PHYSICAL EXAM :  * GENERAL                 : NAD, Well-groomed, Well-developed  * HEAD                       :  Atraumatic, Normocephalic  * EYES                         : EOMI, PERRLA, Conjunctiva and Sclera clear  * ENT                           : Moist Mucous Membranes  * NECK                         : Supple, No JVD, Normal Thyroid  * CHEST/LUNG           : Clear to Auscultation bilaterally; No Rales, Rhonchi, Wheezing or Rubs  * HEART                       : Regular Rate and Rhythm; No murmurs, Rubs or gallops  * ABDOMEN                : Soft, Non-tender, Non-distended; Bowel Sounds present  * NERVOUS SYSTEM  :  Alert & Oriented X3, Good Concentration; Motor Strength 5/5 B/L UL LL ; DTRs 2+ Intact and Symmetric  * EXTREMITIES            :  2+ Peripheral Pulses, No clubbing, cyanosis, or edema  * SKIN                           : No Rashes or Lesions    LABS:                          9.3    7.3   )-----------( 180      ( 06 Aug 2018 14:26 )             28.2     08-06    142  |  105  |  71<H>  ----------------------------<  212<H>  4.8   |  28  |  2.86<H>    Ca    9.0      06 Aug 2018 14:26  Phos  3.2     08-06  Mg     2.5     08-06    TPro  6.5  /  Alb  2.9<L>  /  TBili  0.8  /  DBili  x   /  AST  10  /  ALT  12  /  AlkPhos  60  08-06    PT/INR - ( 05 Aug 2018 13:00 )   PT: 11.3 sec;   INR: 1.04 ratio         PTT - ( 05 Aug 2018 13:00 )  PTT:27.3 sec    CAPILLARY BLOOD GLUCOSE          RADIOLOGY & ADDITIONAL TESTS:   No radiological imaging was required    Imaging Personally Reviewed   :  [ ] YES  [ ] NO    Consultant(s) Notes Reviewed :  [ ] YES  [ ] NO Patient is a 80 y/o male from home, ambulates with a cane with PMHX of CAD, + Stent placed in 2009 on Plavix QOD, AICD placed in 2011 then removed as it got infected, later had a right sided pacemaker placed in dec 2017 for bradycardia , Prostate CA, S/P surgery and RTX, Rt Nephrectomy for Rt Renal cell CA, Htn, HLD  copd, CKD ( baseline creatinine is 2.0) , Bradycardia (s/p Medtronic AICD), CKD S/P Colonoscopy in March 2015 c/w Diverticulosis , HX of Polyps removed, PVD ( s/p balloon left leg stent) came with complain of dyspnea and dry cough x 1 day. Patient states he is developing worsening shortness of breath since he was discharged yesterday. 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 he was intubated and then self extubated himself in 24 hours. he was safely downgraded to floor and was rx with IV steroids. He said he was doing fine till Friday when he walked with Physical therapy and was discharged to Home yesterday, by the time he reached Home he had worsening cough and left sided pleuritic chest pain. His blurry vision is now improved though. Patient reported weight loss of 20 lbs in past couple of months. He denies  fever, chills, nausea, vomiting, diarrhea, constipation or any other complains.     In ED, patient's vital signs were remarkable for BP of 181/74, HR of 89, Temp normal, Labs were significant for elevated wbc count of 12k with left shift, HB 10.6.  Cr: 2.89. EKG showed NSR, RBBB, No ST-T wave changes, paced rhythm. Received IV Lasix 40 x 1 in ED, Solumedrol 125mg x 1 and Duoneb. Patient had CT chest which shows The lung windows demonstrate volume loss of the left lung with mediastinal shift to the left. 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 The left lower lobe is entirely consolidated. There is apparent normal aeration of the left upper lobe indicating the occlusion of the left bronchus may have occurred recently.  Prominent lymph nodes are seen in the left paratracheal space. off note: Patient sees pulmonologist Dr Belle who performed a CT chest 3 months ago, and saw a small lesion. Patient was informed that he might have some malignant lesion, but its too small to be biopsied and was advised to get follow up CT scans in futures.  Goals of Care: Goals of care were discussed at bed time . Patient wants to be full code at this point. Son witnessed conversations at bed side.              Review of Systems:  Other Review of Systems: All other review of systems negative, except as noted in HPI	      pt seen in tele [ x ], reg med floor [   ], bed [ x ], chair at bedside [   ], a+o x3 [ x ], lethargic [  ],  nad [ x ]        Allergies    aspirin (Anaphylaxis)        Vitals    T(F): 98.2 (08-06-18 @ 05:09), Max: 98.5 (08-05-18 @ 18:53)  HR: 73 (08-06-18 @ 05:09) (73 - 90)  BP: 144/59 (08-06-18 @ 05:09) (144/59 - 184/83)  RR: 18 (08-06-18 @ 05:09) (16 - 26)  SpO2: 97% (08-06-18 @ 05:09) (97% - 99%)  Wt(kg): --  CAPILLARY BLOOD GLUCOSE      POCT Blood Glucose.: 187 mg/dL (04 Aug 2018 12:00)      Labs                          10.6   12.0  )-----------( 251      ( 05 Aug 2018 13:00 )             32.1       08-05    140  |  102  |  73<H>  ----------------------------<  138<H>  4.0   |  27  |  2.89<H>    Ca    9.3      05 Aug 2018 13:00    TPro  7.4  /  Alb  3.6  /  TBili  1.1  /  DBili  x   /  AST  14  /  ALT  17  /  AlkPhos  72  08-05      CARDIAC MARKERS ( 05 Aug 2018 13:00 )  0.033 ng/mL / x     / 20 U/L / x     / x                Radiology Results      Meds    MEDICATIONS  (STANDING):  acetylcysteine 20% Inhalation 3 milliLiter(s) Inhalation every 6 hours  ALBUTerol/ipratropium for Nebulization 3 milliLiter(s) Nebulizer every 6 hours  cloNIDine 0.3 milliGRAM(s) Oral three times a day  clopidogrel Tablet 75 milliGRAM(s) Oral daily  enoxaparin Injectable 30 milliGRAM(s) SubCutaneous daily  hydrALAZINE 25 milliGRAM(s) Oral every 8 hours  methylPREDNISolone sodium succinate Injectable 40 milliGRAM(s) IV Push every 8 hours  metoprolol succinate ER 50 milliGRAM(s) Oral daily  montelukast 10 milliGRAM(s) Oral daily  pantoprazole  Injectable 40 milliGRAM(s) IV Push daily  piperacillin/tazobactam IVPB. 3.375 Gram(s) IV Intermittent every 12 hours  simvastatin 10 milliGRAM(s) Oral at bedtime      MEDICATIONS  (PRN):  oxyCODONE    5 mG/acetaminophen 325 mG 1 Tablet(s) Oral every 6 hours PRN Severe Pain (7 - 10)      Physical Exam    Neuro :  no focal deficits  Respiratory: decr left lower lobe  CV: RRR, S1S2, no murmurs,   Abdominal: Soft, NT, ND +BS,  Extremities: No edema, + peripheral pulses

## 2018-08-06 NOTE — PROGRESS NOTE ADULT - PROBLEM SELECTOR PLAN 5
-elevated pro-BNP ~7000  -s/p 1 dose lasix 40 IV  -hold on further Lasix as patient looks dehydrated on examination  -ECHO recently in July 2018 showed EF >55% with mild Pulmonary HTN 44mmhg.  - Stress test Pending

## 2018-08-06 NOTE — PROGRESS NOTE ADULT - SUBJECTIVE AND OBJECTIVE BOX
Patient is a 85y old  Male who presents with a chief complaint of SOB (05 Aug 2018 17:46)      INTERVAL HPI/OVERNIGHT EVENTS:  feeling better today/leg swelling down  cough    VITAL SIGNS:  T(F): 98.7 (08-06-18 @ 08:03)  HR: 63 (08-06-18 @ 08:03)  BP: 111/72 (08-06-18 @ 08:03)  RR: 18 (08-06-18 @ 08:03)  SpO2: 97% (08-06-18 @ 08:03)  Wt(kg): --  I&O's Detail    05 Aug 2018 07:01  -  06 Aug 2018 07:00  --------------------------------------------------------  IN:  Total IN: 0 mL    OUT:    Voided: 300 mL  Total OUT: 300 mL    Total NET: -300 mL      06 Aug 2018 07:01  -  06 Aug 2018 09:55  --------------------------------------------------------  IN:    Oral Fluid: 200 mL  Total IN: 200 mL    OUT:  Total OUT: 0 mL    Total NET: 200 mL              REVIEW OF SYSTEMS:    CONSTITUTIONAL:  No fevers, chills, sweats    HEENT:  Eyes:  No diplopia or blurred vision. ENT:  No earache, sore throat or runny nose.    CARDIOVASCULAR:  No pressure, squeezing, tightness, or heaviness about the chest; no palpitations.    RESPIRATORY:  Per HPI    GASTROINTESTINAL:  No abdominal pain, nausea, vomiting or diarrhea.    GENITOURINARY:  No dysuria, frequency or urgency.    NEUROLOGIC:  No paresthesias, fasciculations, seizures or weakness.    PSYCHIATRIC:  No disorder of thought or mood.      PHYSICAL EXAM:    Constitutional:no complaints  ENMT:atnc  Neck:supple  Respiratory:clear, wet cough  Cardiovascular:rr  Gastrointestinal:soft  Extremities:no edema  Vascular:intact  Neurological:non focal  Musculoskeletal:no edema, normal rom    MEDICATIONS  (STANDING):  acetylcysteine 20% Inhalation 3 milliLiter(s) Inhalation every 6 hours  ALBUTerol/ipratropium for Nebulization 3 milliLiter(s) Nebulizer every 6 hours  cloNIDine 0.3 milliGRAM(s) Oral three times a day  clopidogrel Tablet 75 milliGRAM(s) Oral daily  enoxaparin Injectable 30 milliGRAM(s) SubCutaneous daily  hydrALAZINE 25 milliGRAM(s) Oral every 8 hours  methylPREDNISolone sodium succinate Injectable 40 milliGRAM(s) IV Push every 8 hours  metoprolol succinate ER 50 milliGRAM(s) Oral daily  montelukast 10 milliGRAM(s) Oral daily  pantoprazole  Injectable 40 milliGRAM(s) IV Push daily  piperacillin/tazobactam IVPB. 3.375 Gram(s) IV Intermittent every 12 hours  simvastatin 10 milliGRAM(s) Oral at bedtime    MEDICATIONS  (PRN):  oxyCODONE    5 mG/acetaminophen 325 mG 1 Tablet(s) Oral every 6 hours PRN Severe Pain (7 - 10)      Allergies    aspirin (Anaphylaxis)            LABS:                        10.6   12.0  )-----------( 251      ( 05 Aug 2018 13:00 )             32.1     08-05    140  |  102  |  73<H>  ----------------------------<  138<H>  4.0   |  27  |  2.89<H>    Ca    9.3      05 Aug 2018 13:00    TPro  7.4  /  Alb  3.6  /  TBili  1.1  /  DBili  x   /  AST  14  /  ALT  17  /  AlkPhos  72  08-05    PT/INR - ( 05 Aug 2018 13:00 )   PT: 11.3 sec;   INR: 1.04 ratio         PTT - ( 05 Aug 2018 13:00 )  PTT:27.3 sec      CARDIAC MARKERS ( 05 Aug 2018 13:00 )  0.033 ng/mL / x     / 20 U/L / x     / x          CAPILLARY BLOOD GLUCOSE        pro-bnp 7116 08-05 @ 13:00     d-dimer --  08-05 @ 13:00      RADIOLOGY & ADDITIONAL TESTS:    CXR:  EXAM:  XR CHEST AP OR PA 1V                            PROCEDURE DATE:  08/05/2018          INTERPRETATION:  Chest one view    HISTORY: Chest pain    COMPARISON STUDY: 8/2/2018    Rotated expiratory view of the chest shows the heart to be probably   similar in size. The lungs show consolidation of the left lung base   indicating infiltrate or fluid or a combination of both and there is no   evidence of pneumothorax. Right cardiac pacemaker is again noted.    IMPRESSION:  Left base fluid/infiltrate.    Thank you for the courtesy of this referral.      Ct scan chest:    EXAM:  CT CHEST                            PROCEDURE DATE:  08/05/2018          INTERPRETATION:  CT of the Chest without contrast    HISTORY: Shortness of breath with left infiltrate/effusion on chest x-ray    Technique: Multi-slice volumetric acquisition using 2.5 mm collimation.    Intravenous contrast was not administered.     INTERPRETATION: The lung windows demonstrate volume loss of the left lung   with mediastinal shift to the left. The left bronchus is occluded near   its origin likely due to retained secretions. The left lower lobe is   entirely consolidated. There is apparent normal aeration of the left   upper lobe indicating the occlusion of the left bronchus may have   occurred recently.      The mediastinum shows borderline heart size. Right cardiac pacemaker is   present. Prominent lymph nodes are seen in the left paratracheal space.      No pericardial nor pleural effusion is identified.      The trachea and proximal right bronchus show no focal lesions.    The bony structures show spurring along the thoracic spine.      Below the hemidiaphragms, left renal cysts are present and calcified   gallstones lie dependently in the gallbladder. The patient is status post   right nephrectomy. Bowel overlies lateral to the liver.    IMPRESSION: Occluded left bronchus with consolidation of the left lower   lobe and mediastinal shift to the left.    Thank you for this referral          ekg;    echo: Patient is a 85y old  Male who presents with a chief complaint of SOB (05 Aug 2018 17:46)      INTERVAL HPI/OVERNIGHT EVENTS:  feeling better today/leg swelling down  cough    VITAL SIGNS:  T(F): 98.7 (08-06-18 @ 08:03)  HR: 63 (08-06-18 @ 08:03)  BP: 111/72 (08-06-18 @ 08:03)  RR: 18 (08-06-18 @ 08:03)  SpO2: 97% (08-06-18 @ 08:03)  Wt(kg): --  I&O's Detail    05 Aug 2018 07:01  -  06 Aug 2018 07:00  --------------------------------------------------------  IN:  Total IN: 0 mL    OUT:    Voided: 300 mL  Total OUT: 300 mL    Total NET: -300 mL      06 Aug 2018 07:01  -  06 Aug 2018 09:55  --------------------------------------------------------  IN:    Oral Fluid: 200 mL  Total IN: 200 mL    OUT:  Total OUT: 0 mL    Total NET: 200 mL              REVIEW OF SYSTEMS:    CONSTITUTIONAL:  No fevers, chills, sweats    HEENT:  Eyes:  No diplopia or blurred vision. ENT:  No earache, sore throat or runny nose.    CARDIOVASCULAR:  No pressure, squeezing, tightness, or heaviness about the chest; no palpitations.    RESPIRATORY:  Per HPI    GASTROINTESTINAL:  No abdominal pain, nausea, vomiting or diarrhea.    GENITOURINARY:  No dysuria, frequency or urgency.    NEUROLOGIC:  No paresthesias, fasciculations, seizures or weakness.    PSYCHIATRIC:  No disorder of thought or mood.      PHYSICAL EXAM:    Constitutional:no complaints  ENMT:atnc  Neck:supple  Respiratory:clear, wet cough  Cardiovascular:rr  Gastrointestinal:soft  Extremities:no edema  Vascular:intact  Neurological:non focal  Musculoskeletal:no edema, normal rom    MEDICATIONS  (STANDING):  acetylcysteine 20% Inhalation 3 milliLiter(s) Inhalation every 6 hours  ALBUTerol/ipratropium for Nebulization 3 milliLiter(s) Nebulizer every 6 hours  cloNIDine 0.3 milliGRAM(s) Oral three times a day  clopidogrel Tablet 75 milliGRAM(s) Oral daily  enoxaparin Injectable 30 milliGRAM(s) SubCutaneous daily  hydrALAZINE 25 milliGRAM(s) Oral every 8 hours  methylPREDNISolone sodium succinate Injectable 40 milliGRAM(s) IV Push every 8 hours  metoprolol succinate ER 50 milliGRAM(s) Oral daily  montelukast 10 milliGRAM(s) Oral daily  pantoprazole  Injectable 40 milliGRAM(s) IV Push daily  piperacillin/tazobactam IVPB. 3.375 Gram(s) IV Intermittent every 12 hours  simvastatin 10 milliGRAM(s) Oral at bedtime    MEDICATIONS  (PRN):  oxyCODONE    5 mG/acetaminophen 325 mG 1 Tablet(s) Oral every 6 hours PRN Severe Pain (7 - 10)      Allergies    aspirin (Anaphylaxis)            LABS:                        10.6   12.0  )-----------( 251      ( 05 Aug 2018 13:00 )             32.1     08-05    140  |  102  |  73<H>  ----------------------------<  138<H>  4.0   |  27  |  2.89<H>    Ca    9.3      05 Aug 2018 13:00    TPro  7.4  /  Alb  3.6  /  TBili  1.1  /  DBili  x   /  AST  14  /  ALT  17  /  AlkPhos  72  08-05    PT/INR - ( 05 Aug 2018 13:00 )   PT: 11.3 sec;   INR: 1.04 ratio         PTT - ( 05 Aug 2018 13:00 )  PTT:27.3 sec      CARDIAC MARKERS ( 05 Aug 2018 13:00 )  0.033 ng/mL / x     / 20 U/L / x     / x          CAPILLARY BLOOD GLUCOSE        pro-bnp 7116 08-05 @ 13:00     d-dimer --  08-05 @ 13:00      RADIOLOGY & ADDITIONAL TESTS:    CXR:  EXAM:  XR CHEST AP OR PA 1V                            PROCEDURE DATE:  08/05/2018          INTERPRETATION:  Chest one view    HISTORY: Chest pain    COMPARISON STUDY: 8/2/2018    Rotated expiratory view of the chest shows the heart to be probably   similar in size. The lungs show consolidation of the left lung base   indicating infiltrate or fluid or a combination of both and there is no   evidence of pneumothorax. Right cardiac pacemaker is again noted.    IMPRESSION:  Left base fluid/infiltrate.    Thank you for the courtesy of this referral.      Ct scan chest:    EXAM:  CT CHEST                            PROCEDURE DATE:  08/05/2018          INTERPRETATION:  CT of the Chest without contrast    HISTORY: Shortness of breath with left infiltrate/effusion on chest x-ray    Technique: Multi-slice volumetric acquisition using 2.5 mm collimation.    Intravenous contrast was not administered.     INTERPRETATION: The lung windows demonstrate volume loss of the left lung   with mediastinal shift to the left. The left bronchus is occluded near   its origin likely due to retained secretions. The left lower lobe is   entirely consolidated. There is apparent normal aeration of the left   upper lobe indicating the occlusion of the left bronchus may have   occurred recently.      The mediastinum shows borderline heart size. Right cardiac pacemaker is   present. Prominent lymph nodes are seen in the left paratracheal space.      No pericardial nor pleural effusion is identified.      The trachea and proximal right bronchus show no focal lesions.    The bony structures show spurring along the thoracic spine.      Below the hemidiaphragms, left renal cysts are present and calcified   gallstones lie dependently in the gallbladder. The patient is status post   right nephrectomy. Bowel overlies lateral to the liver.    IMPRESSION: Occluded left bronchus with consolidation of the left lower   lobe and mediastinal shift to the left.    Thank you for this referral

## 2018-08-06 NOTE — CONSULT NOTE ADULT - PROBLEM SELECTOR RECOMMENDATION 9
1- Continue IV Zosyn 3.375 gm IVPB q 12 hours.  2- Renal management.  3- O2 as needed.  4- Pulmonary management.  5- F/u CXR.  6- Blood and urine cultures.

## 2018-08-06 NOTE — PROGRESS NOTE ADULT - PROBLEM SELECTOR PLAN 1
-Patient presented with SOB and left sided pleuritic chest pain  -CT shows left lobe consolidation and occluded left main bronchus  -elevated wbc count to 12k with left shift, worsening cough and sputum production.  -on Zosyn Q12 hrs for now.   -f/u procalcitonin  -Pulmonary consuled Dr em  - Xray Chest Pending

## 2018-08-06 NOTE — CONSULT NOTE ADULT - ASSESSMENT
Patient is a 82 y/o male from home, ambulates with a cane with PMHX of CAD, + Stent placed in 2009 on Plavix QOD, AICD placed in 2011 then removed as it got infected, later had a right sided pacemaker placed in dec 2017 for bradycardia , Prostate CA, S/P surgery and RTX, Rt Nephrectomy for Rt Renal cell CA, Htn, HLD  copd, CKD ( baseline creatinine is 2.0) , Bradycardia (s/p Medtronic AICD), CKD S/P Colonoscopy in March 2015 c/w Diverticulosis , HX of Polyps removed, PVD ( s/p balloon left leg stent) came with complain of dyspnea and dry cough x 1 day. Patient was admitted for pneumonia and was started on IV Zosyn, renally adjusted dose.

## 2018-08-06 NOTE — PROGRESS NOTE ADULT - ASSESSMENT
-improved sob-pn/bronchitis/tmo mucus plugging-s/p recent bronch/laryngoscopy  -copd-never smoker  -recent admission with intubation  -hx; chf,pacer,RCC rt nephrectomy,ckd,htn,hld,pvd=-lle stent,diverticulosis    plan; nebs/steroids/antibx-? vibration vest-f/u ct several days re tom plug          dvt ppx          cardio f/u-NST -improved sob-pn/bronchitis/tom mucus plugging-s/p recent bronch/laryngoscopy  -copd-never smoker  -recent admission with intubation  -hx; chf,pacer,RCC rt nephrectomy,ckd,htn,hld,pvd=-lle stent,diverticulosis    plan; nebs-mucomyst/steroids/antibx-to cover HCAP? vibration vest-f/u ct several days re tom plug          dvt ppx          cardio f/u-NST

## 2018-08-06 NOTE — PROGRESS NOTE ADULT - ASSESSMENT
Patient is a 82 y/o male from home, ambulates with a cane with PMHX of CAD, + Stent placed in 2009 on Plavix QOD, AICD placed in 2011 then removed as it got infected, later had a right sided pacemaker placed in dec 2017 for bradycardia , Prostate CA, S/P surgery and RTX, Rt Nephrectomy for Rt Renal cell CA, Htn, HLD  copd, CKD ( baseline creatinine is 2.0) , Bradycardia (s/p Medtronic AICD), CKD S/P Colonoscopy in March 2015 c/w Diverticulosis , HX of Polyps removed, PVD ( s/p balloon left leg stent) came with complain of dyspnea and dry cough x 1 day. Patient states he is developing worsening shortness of breath since he was discharged yesterday. 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

## 2018-08-06 NOTE — CONSULT NOTE ADULT - SUBJECTIVE AND OBJECTIVE BOX
HPI:  Patient is a 80 y/o male from home, ambulates with a cane with PMHX of CAD, + Stent placed in 2009 on Plavix QOD, AICD placed in 2011 then removed as it got infected, later had a right sided pacemaker placed in dec 2017 for bradycardia , Prostate CA, S/P surgery and RTX, Rt Nephrectomy for Rt Renal cell CA, Htn, HLD  copd, CKD ( baseline creatinine is 2.0) , Bradycardia (s/p Medtronic AICD), CKD S/P Colonoscopy in March 2015 c/w Diverticulosis , HX of Polyps removed, PVD ( s/p balloon left leg stent) came with complain of dyspnea and dry cough x 1 day. Patient states he is developing worsening shortness of breath since he was discharged yesterday. He was Rx at [facility] for CHF exacerbation, foreign body inhalation complicated by vocal cord inflammation. he was intubated and then self extubated himself in 24 hours. he was safely downgraded to floor and was rx with IV steroids. He said he was doing fine till Friday when he walked with Physical therapy and was discharged to Home yesterday, by the time he reached Home he had worsening cough and left sided pleuritic chest pain. His blurry vision is now improved though. Patient reported weight loss of 20 lbs in past couple of months. He denies  fever, chills, nausea, vomiting, diarrhea, constipation or any other complains.     In ED, patient's vital signs were remarkable for BP of 181/74, HR of 89, Temp normal, Labs were significant for elevated wbc count of 12k with left shift, HB 10.6.  Cr: 2.89. EKG showed NSR, RBBB, No ST-T wave changes, paced rhythm. Received IV Lasix 40 x 1 in ED, Solumedrol 125mg x 1 and Duoneb. Patient had CT chest which shows The lung windows demonstrate volume loss of the left lung with mediastinal shift to the left. 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 The left lower lobe is entirely consolidated. There is apparent normal aeration of the left upper lobe indicating the occlusion of the left bronchus may have occurred recently.  Prominent lymph nodes are seen in the left paratracheal space. off note: Patient sees pulmonologist Dr Belle who performed a CT chest 3 months ago, and saw a small lesion. Patient was informed that he might have some malignant lesion, but its too small to be biopsied and was advised to get follow up CT scans in futures.  Goals of Care: Goals of care were discussed at bed time . Patient wants to be full code at this point. Son witnessed conversations at bed side. (05 Aug 2018 17:46)      PAST MEDICAL & SURGICAL HISTORY:  Stented coronary artery  Pacemaker  CKD (chronic kidney disease)  Hypertension  AICD (automatic cardioverter/defibrillator) present  CAD (coronary artery disease)  Kidney carcinoma: s/p right nephrectomy  Prostate cancer  Kidney carcinoma: s/p right nephrectomy  Hernia: Hernioplasty  Prostate: prostatectomy      aspirin (Anaphylaxis)      acetylcysteine 20% Inhalation 3 milliLiter(s) Inhalation every 6 hours  ALBUTerol/ipratropium for Nebulization 3 milliLiter(s) Nebulizer every 6 hours  cloNIDine 0.3 milliGRAM(s) Oral three times a day  clopidogrel Tablet 75 milliGRAM(s) Oral daily  enoxaparin Injectable 30 milliGRAM(s) SubCutaneous daily  hydrALAZINE 25 milliGRAM(s) Oral every 8 hours  methylPREDNISolone sodium succinate Injectable 40 milliGRAM(s) IV Push every 8 hours  metoprolol succinate ER 50 milliGRAM(s) Oral daily  montelukast 10 milliGRAM(s) Oral daily  oxyCODONE    5 mG/acetaminophen 325 mG 1 Tablet(s) Oral every 6 hours PRN  pantoprazole  Injectable 40 milliGRAM(s) IV Push daily  piperacillin/tazobactam IVPB. 3.375 Gram(s) IV Intermittent every 12 hours  simvastatin 10 milliGRAM(s) Oral at bedtime      Social Hx:    FAMILY HISTORY:  No pertinent family history in first degree relatives        ROS  [  ] UNABLE TO ELICIT    General:  [  ] None  [  ] Fever  [  ] Chills  [ x ] Malaise    Skin:  [ x ] None [  ] Rash  [  ] Wound  [  ] Ulcer    HEENT:  [ x ] None  [  ] Sore Throat  [  ] Nasal congestion/ runny nose  [  ] Photophobia [  ] Neck pain      Chest:  [  ] None   [ x ] SOB  [ x ] Cough  [  ] None    Cardiovascular:   [ x ] None  [  ] CP  [  ] Palpitation    Gastrointestinal:  [ x ] None  [  ] Abd pain   [  ] Nausea    [  ] Vomiting   [  ] Diarrhea	     Genitourinary:  [ x ] None [  ] Polyuria   [  ] Urgency  [  ] Frequency  [  ] Dysuria    [  ]  Hematuria       Musculoskeletal:  [  ] None [  ] Back Pain	[  ] Body aches  [  ] Joint pain  [ x ] Weakness     Neurological: [  ] None [  ]Dizziness  [  ]Visual Disturbance  [  ]Headaches   [ x ] Weakness      PHYSICAL EXAM:    Vital Signs Last 24 Hrs  T(C): 37.1 (06 Aug 2018 08:03), Max: 37.1 (06 Aug 2018 08:03)  T(F): 98.7 (06 Aug 2018 08:03), Max: 98.7 (06 Aug 2018 08:03)  HR: 63 (06 Aug 2018 08:03) (63 - 90)  BP: 111/72 (06 Aug 2018 08:03) (111/72 - 184/83)  BP(mean): --  RR: 18 (06 Aug 2018 08:03) (16 - 26)  SpO2: 97% (06 Aug 2018 08:03) (97% - 99%)    Constitutional:    HEENT: [ x ] Wnl  [  ] Pharyngeal congestion    Neck:  [ x ] Supple  [  ]Lymphadenopathy  [ x ] No JVD   [  ] JVD  [  ] Masses   [  ] WNL    CHEST/Respiratory:  [  ]Clear to auscultation  [ x ] Rales   [  ] Rhonchi   [  ] Wheezing     [  ] Chest Tenderness      Cardiovascular:  [ x ] Reg S1 S2   [  ] Irreg S1 S2   [ x ]No Murmur  [  ] +ve Murmurs  [  ]Systolic [  ]Diastolic      Abdomen:  [ x ] Soft  [ x ] No tendrerness  [  ] Tenderness  [  ] Organomegaly  [  ] ABD Distention  [  ] Rigidity                       [ x ] No Regidity                       [ x ] No Rebound Tenderness  [  ] No Guarding Rigidity  [  ] Rebound Tenderness[  ] Guarding Rigidity                          [ x ]  +ve Bowel Sounds  [  ] Decreased Bowel Sounds    [  ] Absent Bowel Sounds                            Extremities: [ x ] No edema [  ] Edema  [  ] Clubbing   [  ] Cyanosis                         [ x ] No Tender Calf muscles  [  ] Tender Calf muscles                        [ x ] Palpable peripheral pulses    Neurological: [ x ] Awake  [ x ] Alert  [ x ] Oriented  x  3                           [  ] Confused  [  ] Drowsy  [  ] respond to painful stimuli  [  ] Unresponsive    Skin:  [ x ] Intact [  ] Redness [  ] Thrombophlebitis  [  ] Rashes  [  ] Dry  [  ] Ulcers    Ortho:  [  ] Joint Swelling  [  ] Joint erythema [  ] Joint tenderness                [  ] Increased temp. to touch  [  ] DJD [ x ] WNL      LABS/DIAGNOSTIC TESTS                          10.6   12.0  )-----------( 251      ( 05 Aug 2018 13:00 )             32.1     WBC Count: 12.0 K/uL (08-05 @ 13:00)      08-05    140  |  102  |  73<H>  ----------------------------<  138<H>  4.0   |  27  |  2.89<H>    Ca    9.3      05 Aug 2018 13:00    TPro  7.4  /  Alb  3.6  /  TBili  1.1  /  DBili  x   /  AST  14  /  ALT  17  /  AlkPhos  72  08-05          LIVER FUNCTIONS - ( 05 Aug 2018 13:00 )  Alb: 3.6 g/dL / Pro: 7.4 g/dL / ALK PHOS: 72 U/L / ALT: 17 U/L DA / AST: 14 U/L / GGT: x             PT/INR - ( 05 Aug 2018 13:00 )   PT: 11.3 sec;   INR: 1.04 ratio         PTT - ( 05 Aug 2018 13:00 )  PTT:27.3 sec          CULTURES:     None yet.      RADIOLOGY:    EXAM:  CT CHEST                            PROCEDURE DATE:  08/05/2018          INTERPRETATION:  CT of the Chest without contrast    HISTORY: Shortness of breath with left infiltrate/effusion on chest x-ray    Technique: Multi-slice volumetric acquisition using 2.5 mm collimation.    Intravenous contrast was not administered.     INTERPRETATION: The lung windows demonstrate volume loss of the left lung   with mediastinal shift to the left. The left bronchus is occluded near   its origin likely due to retained secretions. The left lower lobe is   entirely consolidated. There is apparent normal aeration of the left   upper lobe indicating the occlusion of the left bronchus may have   occurred recently.      The mediastinum shows borderline heart size. Right cardiac pacemaker is   present. Prominent lymph nodes are seen in the left paratracheal space.      No pericardial nor pleural effusion is identified.      The trachea and proximal right bronchus show no focal lesions.    The bony structures show spurring along the thoracic spine.      Below the hemidiaphragms, left renal cysts are present and calcified   gallstones lie dependently in the gallbladder. The patient is status post   right nephrectomy. Bowel overlies lateral to the liver.    IMPRESSION: Occluded left bronchus with consolidation of the left lower   lobe and mediastinal shift to the left.          EXAM:  XR CHEST AP OR PA 1V                            PROCEDURE DATE:  08/05/2018          INTERPRETATION:  Chest one view    HISTORY: Chest pain    COMPARISON STUDY: 8/2/2018    Rotated expiratory view of the chest shows the heart to be probably   similar in size. The lungs show consolidation of the left lung base   indicating infiltrate or fluid or a combination of both and there is no   evidence of pneumothorax. Right cardiac pacemaker is again noted.    IMPRESSION:  Left base fluid/infiltrate.

## 2018-08-07 LAB
ALBUMIN SERPL ELPH-MCNC: 3 G/DL — LOW (ref 3.5–5)
ALP SERPL-CCNC: 59 U/L — SIGNIFICANT CHANGE UP (ref 40–120)
ALT FLD-CCNC: 15 U/L DA — SIGNIFICANT CHANGE UP (ref 10–60)
ANION GAP SERPL CALC-SCNC: 9 MMOL/L — SIGNIFICANT CHANGE UP (ref 5–17)
AST SERPL-CCNC: 12 U/L — SIGNIFICANT CHANGE UP (ref 10–40)
BASOPHILS # BLD AUTO: 0 K/UL — SIGNIFICANT CHANGE UP (ref 0–0.2)
BASOPHILS NFR BLD AUTO: 0.1 % — SIGNIFICANT CHANGE UP (ref 0–2)
BILIRUB SERPL-MCNC: 0.7 MG/DL — SIGNIFICANT CHANGE UP (ref 0.2–1.2)
BUN SERPL-MCNC: 71 MG/DL — HIGH (ref 7–18)
CALCIUM SERPL-MCNC: 9.4 MG/DL — SIGNIFICANT CHANGE UP (ref 8.4–10.5)
CHLORIDE SERPL-SCNC: 106 MMOL/L — SIGNIFICANT CHANGE UP (ref 96–108)
CO2 SERPL-SCNC: 28 MMOL/L — SIGNIFICANT CHANGE UP (ref 22–31)
CREAT SERPL-MCNC: 2.96 MG/DL — HIGH (ref 0.5–1.3)
EOSINOPHIL # BLD AUTO: 0 K/UL — SIGNIFICANT CHANGE UP (ref 0–0.5)
EOSINOPHIL NFR BLD AUTO: 0 % — SIGNIFICANT CHANGE UP (ref 0–6)
GLUCOSE SERPL-MCNC: 183 MG/DL — HIGH (ref 70–99)
HCT VFR BLD CALC: 29.9 % — LOW (ref 39–50)
HGB BLD-MCNC: 9.9 G/DL — LOW (ref 13–17)
LYMPHOCYTES # BLD AUTO: 0.6 K/UL — LOW (ref 1–3.3)
LYMPHOCYTES # BLD AUTO: 4.8 % — LOW (ref 13–44)
MAGNESIUM SERPL-MCNC: 2.7 MG/DL — HIGH (ref 1.6–2.6)
MCHC RBC-ENTMCNC: 29.8 PG — SIGNIFICANT CHANGE UP (ref 27–34)
MCHC RBC-ENTMCNC: 33.2 GM/DL — SIGNIFICANT CHANGE UP (ref 32–36)
MCV RBC AUTO: 89.7 FL — SIGNIFICANT CHANGE UP (ref 80–100)
MONOCYTES # BLD AUTO: 0.6 K/UL — SIGNIFICANT CHANGE UP (ref 0–0.9)
MONOCYTES NFR BLD AUTO: 5.1 % — SIGNIFICANT CHANGE UP (ref 2–14)
NEUTROPHILS # BLD AUTO: 10.4 K/UL — HIGH (ref 1.8–7.4)
NEUTROPHILS NFR BLD AUTO: 89.9 % — HIGH (ref 43–77)
PHOSPHATE SERPL-MCNC: 3.7 MG/DL — SIGNIFICANT CHANGE UP (ref 2.5–4.5)
PLATELET # BLD AUTO: 182 K/UL — SIGNIFICANT CHANGE UP (ref 150–400)
POTASSIUM SERPL-MCNC: 4.1 MMOL/L — SIGNIFICANT CHANGE UP (ref 3.5–5.3)
POTASSIUM SERPL-SCNC: 4.1 MMOL/L — SIGNIFICANT CHANGE UP (ref 3.5–5.3)
PROT SERPL-MCNC: 6.7 G/DL — SIGNIFICANT CHANGE UP (ref 6–8.3)
RBC # BLD: 3.34 M/UL — LOW (ref 4.2–5.8)
RBC # FLD: 12.9 % — SIGNIFICANT CHANGE UP (ref 10.3–14.5)
SODIUM SERPL-SCNC: 143 MMOL/L — SIGNIFICANT CHANGE UP (ref 135–145)
WBC # BLD: 11.6 K/UL — HIGH (ref 3.8–10.5)
WBC # FLD AUTO: 11.6 K/UL — HIGH (ref 3.8–10.5)

## 2018-08-07 PROCEDURE — 71045 X-RAY EXAM CHEST 1 VIEW: CPT | Mod: 26

## 2018-08-07 PROCEDURE — 71045 X-RAY EXAM CHEST 1 VIEW: CPT | Mod: 26,77

## 2018-08-07 RX ADMIN — PANTOPRAZOLE SODIUM 40 MILLIGRAM(S): 20 TABLET, DELAYED RELEASE ORAL at 11:56

## 2018-08-07 RX ADMIN — Medication 0.3 MILLIGRAM(S): at 05:26

## 2018-08-07 RX ADMIN — Medication 3 MILLILITER(S): at 20:41

## 2018-08-07 RX ADMIN — Medication 3 MILLILITER(S): at 14:37

## 2018-08-07 RX ADMIN — Medication 25 MILLIGRAM(S): at 14:45

## 2018-08-07 RX ADMIN — Medication 0.3 MILLIGRAM(S): at 14:45

## 2018-08-07 RX ADMIN — Medication 40 MILLIGRAM(S): at 14:45

## 2018-08-07 RX ADMIN — Medication 25 MILLIGRAM(S): at 22:43

## 2018-08-07 RX ADMIN — Medication 3 MILLILITER(S): at 14:39

## 2018-08-07 RX ADMIN — Medication 40 MILLIGRAM(S): at 22:42

## 2018-08-07 RX ADMIN — Medication 50 MILLIGRAM(S): at 05:26

## 2018-08-07 RX ADMIN — Medication 0.3 MILLIGRAM(S): at 22:42

## 2018-08-07 RX ADMIN — Medication 25 MILLIGRAM(S): at 05:26

## 2018-08-07 RX ADMIN — CLOPIDOGREL BISULFATE 75 MILLIGRAM(S): 75 TABLET, FILM COATED ORAL at 11:56

## 2018-08-07 RX ADMIN — ENOXAPARIN SODIUM 30 MILLIGRAM(S): 100 INJECTION SUBCUTANEOUS at 11:56

## 2018-08-07 RX ADMIN — SIMVASTATIN 10 MILLIGRAM(S): 20 TABLET, FILM COATED ORAL at 22:43

## 2018-08-07 RX ADMIN — PIPERACILLIN AND TAZOBACTAM 25 GRAM(S): 4; .5 INJECTION, POWDER, LYOPHILIZED, FOR SOLUTION INTRAVENOUS at 18:33

## 2018-08-07 RX ADMIN — Medication 40 MILLIGRAM(S): at 05:28

## 2018-08-07 RX ADMIN — PIPERACILLIN AND TAZOBACTAM 25 GRAM(S): 4; .5 INJECTION, POWDER, LYOPHILIZED, FOR SOLUTION INTRAVENOUS at 05:26

## 2018-08-07 RX ADMIN — MONTELUKAST 10 MILLIGRAM(S): 4 TABLET, CHEWABLE ORAL at 11:56

## 2018-08-07 NOTE — PROGRESS NOTE ADULT - ASSESSMENT
-improved sob-pn/bronchitis/tom mucus plugging-s/p recent bronch/laryngoscopy  -copd-never smoker  -recent admission with intubation  -hx; chf,pacer,RCC rt nephrectomy,ckd,htn,hld,pvd--lle stent,diverticulosis    plan; nebs-mucomyst/steroids/antibx-to cover HCAP         -f/u ct several days re tom plug-if no better-tx bronch          dvt ppx          cardio f/u-NST today

## 2018-08-07 NOTE — PROGRESS NOTE ADULT - SUBJECTIVE AND OBJECTIVE BOX
Patient is a 85y old  Male who presents with a chief complaint of SOB (05 Aug 2018 17:46)      INTERVAL HPI/OVERNIGHT EVENTS:  feeling better, still has cough    VITAL SIGNS:  T(F): 97.9 (08-07-18 @ 08:14)  HR: 69 (08-07-18 @ 08:14)  BP: 155/50 (08-07-18 @ 08:14)  RR: 16 (08-07-18 @ 08:14)  SpO2: 98% (08-07-18 @ 08:14)  Wt(kg): --  I&O's Detail    06 Aug 2018 07:01  -  07 Aug 2018 07:00  --------------------------------------------------------  IN:    Oral Fluid: 780 mL  Total IN: 780 mL    OUT:    Voided: 400 mL  Total OUT: 400 mL    Total NET: 380 mL              REVIEW OF SYSTEMS:    CONSTITUTIONAL:  No fevers, chills, sweats    HEENT:  Eyes:  No diplopia or blurred vision. ENT:  No earache, sore throat or runny nose.    CARDIOVASCULAR:  No pressure, squeezing, tightness, or heaviness about the chest; no palpitations.    RESPIRATORY:  Per HPI    GASTROINTESTINAL:  No abdominal pain, nausea, vomiting or diarrhea.    GENITOURINARY:  No dysuria, frequency or urgency.    NEUROLOGIC:  No paresthesias, fasciculations, seizures or weakness.    PSYCHIATRIC:  No disorder of thought or mood.      PHYSICAL EXAM:    Constitutional:no complaints  ENMT:atnc  Neck:supple  Respiratory: diminished bs tom posteriorly  Cardiovascular:rr  Gastrointestinal:soft  Extremities:no edema  Vascular:intact  Neurological:non focal  Musculoskeletal:no edema, normal rom    MEDICATIONS  (STANDING):  acetylcysteine 20% Inhalation 3 milliLiter(s) Inhalation every 6 hours  ALBUTerol/ipratropium for Nebulization 3 milliLiter(s) Nebulizer every 6 hours  cloNIDine 0.3 milliGRAM(s) Oral three times a day  clopidogrel Tablet 75 milliGRAM(s) Oral daily  enoxaparin Injectable 30 milliGRAM(s) SubCutaneous daily  hydrALAZINE 25 milliGRAM(s) Oral every 8 hours  methylPREDNISolone sodium succinate Injectable 40 milliGRAM(s) IV Push every 8 hours  metoprolol succinate ER 50 milliGRAM(s) Oral daily  montelukast 10 milliGRAM(s) Oral daily  pantoprazole  Injectable 40 milliGRAM(s) IV Push daily  piperacillin/tazobactam IVPB. 3.375 Gram(s) IV Intermittent every 12 hours  simvastatin 10 milliGRAM(s) Oral at bedtime    MEDICATIONS  (PRN):  oxyCODONE    5 mG/acetaminophen 325 mG 1 Tablet(s) Oral every 6 hours PRN Severe Pain (7 - 10)      Allergies    aspirin (Anaphylaxis)        LABS:                        9.3    7.3   )-----------( 180      ( 06 Aug 2018 14:26 )             28.2     08-07    143  |  106  |  71<H>  ----------------------------<  183<H>  4.1   |  28  |  2.96<H>    Ca    9.4      07 Aug 2018 07:33  Phos  3.7     08-07  Mg     2.7     08-07    TPro  6.7  /  Alb  3.0<L>  /  TBili  0.7  /  DBili  x   /  AST  12  /  ALT  15  /  AlkPhos  59  08-07    PT/INR - ( 05 Aug 2018 13:00 )   PT: 11.3 sec;   INR: 1.04 ratio         PTT - ( 05 Aug 2018 13:00 )  PTT:27.3 sec      CARDIAC MARKERS ( 05 Aug 2018 13:00 )  0.033 ng/mL / x     / 20 U/L / x     / x          CAPILLARY BLOOD GLUCOSE        pro-bnp [id number] 08-05 @ 13:00

## 2018-08-07 NOTE — PROGRESS NOTE ADULT - SUBJECTIVE AND OBJECTIVE BOX
Meds:  piperacillin/tazobactam IVPB. 3.375 Gram(s) IV Intermittent every 12 hours    Allergies:  Allergies    aspirin (Anaphylaxis)    Intolerances      ROS  [  ] UNABLE TO ELICIT    General:  [  ] None  [  ] Fever  [  ] Chills  [ x ] Malaise    Skin:  [ x ] None [  ] Rash  [  ] Wound  [  ] Ulcer    HEENT:  [ x ] None  [  ] Sore Throat  [  ] Nasal congestion/ runny nose  [  ] Photophobia [  ] Neck pain      Chest:  [  ] None   [ x ] SOB  [ x ] Cough  [  ] None    Cardiovascular:   [ x ] None  [  ] CP  [  ] Palpitation    Gastrointestinal:  [ x ] None  [  ] Abd pain   [  ] Nausea    [  ] Vomiting   [  ] Diarrhea	     Genitourinary:  [ x ] None [  ] Polyuria   [  ] Urgency  [  ] Frequency  [  ] Dysuria    [  ]  Hematuria       Musculoskeletal:  [  ] None [  ] Back Pain	[  ] Body aches  [  ] Joint pain  [ x ] Weakness     Neurological: [  ] None [  ]Dizziness  [  ]Visual Disturbance  [  ]Headaches   [ x ] Weakness          PHYSICAL EXAM:    Vital Signs Last 24 Hrs  T(C): 36.6 (07 Aug 2018 08:14), Max: 37.1 (06 Aug 2018 16:38)  T(F): 97.9 (07 Aug 2018 08:14), Max: 98.7 (06 Aug 2018 16:38)  HR: 69 (07 Aug 2018 08:14) (60 - 69)  BP: 155/50 (07 Aug 2018 08:14) (128/46 - 158/57)  BP(mean): --  RR: 16 (07 Aug 2018 08:14) (16 - 18)  SpO2: 98% (07 Aug 2018 08:14) (97% - 99%)      Constitutional:    HEENT: [ x ] Wnl  [  ] Pharyngeal congestion    Neck:  [ x ] Supple  [  ]Lymphadenopathy  [ x ] No JVD   [  ] JVD  [  ] Masses   [  ] WNL    CHEST/Respiratory:  [  ]Clear to auscultation  [ x ] Rales   [  ] Rhonchi   [  ] Wheezing     [  ] Chest Tenderness      Cardiovascular:  [ x ] Reg S1 S2   [  ] Irreg S1 S2   [ x ]No Murmur  [  ] +ve Murmurs  [  ]Systolic [  ]Diastolic      Abdomen:  [ x ] Soft  [ x ] No tendrerness  [  ] Tenderness  [  ] Organomegaly  [  ] ABD Distention  [  ] Rigidity                       [ x ] No Regidity                       [ x ] No Rebound Tenderness  [  ] No Guarding Rigidity  [  ] Rebound Tenderness[  ] Guarding Rigidity                          [ x ]  +ve Bowel Sounds  [  ] Decreased Bowel Sounds    [  ] Absent Bowel Sounds                            Extremities: [ x ] No edema [  ] Edema  [  ] Clubbing   [  ] Cyanosis                         [ x ] No Tender Calf muscles  [  ] Tender Calf muscles                        [ x ] Palpable peripheral pulses    Neurological: [ x ] Awake  [ x ] Alert  [ x ] Oriented  x  3                           [  ] Confused  [  ] Drowsy  [  ] respond to painful stimuli  [  ] Unresponsive    Skin:  [ x ] Intact [  ] Redness [  ] Thrombophlebitis  [  ] Rashes  [  ] Dry  [  ] Ulcers    Ortho:  [  ] Joint Swelling  [  ] Joint erythema [  ] Joint tenderness                [  ] Increased temp. to touch  [  ] DJD [ x ] WNL        LABS/DIAGNOSTIC TESTS                          9.3    7.3   )-----------( 180      ( 06 Aug 2018 14:26 )             28.2         08-07    143  |  106  |  71<H>  ----------------------------<  183<H>  4.1   |  28  |  2.96<H>    Ca    9.4      07 Aug 2018 07:33  Phos  3.7     08-07  Mg     2.7     08-07    TPro  6.7  /  Alb  3.0<L>  /  TBili  0.7  /  DBili  x   /  AST  12  /  ALT  15  /  AlkPhos  59  08-07      LIVER FUNCTIONS - ( 07 Aug 2018 07:33 )  Alb: 3.0 g/dL / Pro: 6.7 g/dL / ALK PHOS: 59 U/L / ALT: 15 U/L DA / AST: 12 U/L / GGT: x           CULTURES:     None yet.      RADIOLOGY:    EXAM:  CT CHEST                            PROCEDURE DATE:  08/05/2018          INTERPRETATION:  CT of the Chest without contrast    HISTORY: Shortness of breath with left infiltrate/effusion on chest x-ray    Technique: Multi-slice volumetric acquisition using 2.5 mm collimation.    Intravenous contrast was not administered.     INTERPRETATION: The lung windows demonstrate volume loss of the left lung   with mediastinal shift to the left. The left bronchus is occluded near   its origin likely due to retained secretions. The left lower lobe is   entirely consolidated. There is apparent normal aeration of the left   upper lobe indicating the occlusion of the left bronchus may have   occurred recently.      The mediastinum shows borderline heart size. Right cardiac pacemaker is   present. Prominent lymph nodes are seen in the left paratracheal space.      No pericardial nor pleural effusion is identified.      The trachea and proximal right bronchus show no focal lesions.    The bony structures show spurring along the thoracic spine.      Below the hemidiaphragms, left renal cysts are present and calcified   gallstones lie dependently in the gallbladder. The patient is status post   right nephrectomy. Bowel overlies lateral to the liver.    IMPRESSION: Occluded left bronchus with consolidation of the left lower   lobe and mediastinal shift to the left.          EXAM:  XR CHEST AP OR PA 1V                            PROCEDURE DATE:  08/05/2018          INTERPRETATION:  Chest one view    HISTORY: Chest pain    COMPARISON STUDY: 8/2/2018    Rotated expiratory view of the chest shows the heart to be probably   similar in size. The lungs show consolidation of the left lung base   indicating infiltrate or fluid or a combination of both and there is no   evidence of pneumothorax. Right cardiac pacemaker is again noted.    IMPRESSION:  Left base fluid/infiltrate.      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 Patient was admitted for pneumonia and was started on IV Zosyn, renally adjusted dose.    Problem/Recommendation - 1:  Problem: Pneumonia, bacterial.   Recommendation:   1- Continue IV Zosyn 3.375 gm IVPB q 12 hours.  2- Renal management.  3- O2 as needed.  4- Pulmonary management.  5- F/u CXR.  6- Blood and urine cultures.    Problem/Recommendation - 2:  ·  Problem: COPD exacerbation.    Recommendation:   1- Bronchodilators.  2- O2 as needed.  3- Pulmonary evaluation and management.  4- Steroids as per primary, and pulmonary team.     Problem/Recommendation - 3:  ·  Problem: Hypertension, and CHF.    Recommendation:   1- Monitor Blood pressure closely.  2- Blood pressure control.  3- BP. meds as per cardiology and primary care team.   4- Cardiology management and follow up.    Problem/Recommendation - 4:  ·  Problem: CKD (chronic kidney disease).    Recommendation:   1- ABX dose adjustment.  2- Fluid and electrolytes management.   3- Nephrology follow up.     Discussed with medical resident, and PCP.

## 2018-08-07 NOTE — DISCHARGE NOTE ADULT - CARE PLAN
Principal Discharge DX:	Consolidation lung  Goal:	to treat Pneumonia and improve respiratory functions  Assessment and plan of treatment:	- You presented with SOB and left sided pleuritic chest pain  - Chest Xray showed left lower lobe lung collapse   -elevated wbc count to 12k with left shift, worsening cough and sputum production.  - Your symptoms improve after coughing the mucous plug  - you were on Zosyn  - Pulmonary consuled Dr em  - CT shows left lobe consolidation and occluded left main bronchus  repeat CT Showed Small left pleural effusion with basilar atelectasis or consolidation.  Indeterminate lesion in the posterior left interpolar region measuring   2.4 cm.   - You need to Consider further evaluation with MRI abdomen with your PCP as outpatient  - You need to continue on Levaquin 250 mg orally every day till 8/14/18.  - You need to follow up with your Primary Care Physician in 1 week.  - You need to continue on your medications as prescribed  Secondary Diagnosis:	Hypertension  Assessment and plan of treatment:	- You have a history of Hypertension   - You need to continue on Clonidine ,hydralazine and metoprolol  -monitor Bp.  -DASH diet.   - You need to follow up with your Primary Care Physician in 1 week.  - You need to continue on your medications as prescribed  Secondary Diagnosis:	CKD (chronic kidney disease)  Assessment and plan of treatment:	-Likely secondary to uncontrolled HTN  -patient kept euvolemic   -fluid restriction 1L daily,   - pt resumed on Lasix.  Secondary Diagnosis:	COPD exacerbation  Assessment and plan of treatment:	-SOB and wheezing on examination, cough, sputum production  -c/w Solumedrol 40 mg iv Q8 hrs   -c/w Supplemental O2   -c/w Duoneb Q6 hrs.  Secondary Diagnosis:	Acute on chronic congestive heart failure, unspecified heart failure type  Assessment and plan of treatment:	Acute on chronic congestive heart failure, unspecified heart failure type.  Plan: -elevated pro-BNP ~7000  -s/p 1 dose lasix 40 IV  -ECHO recently in July 2018 showed EF >55% with mild Pulmonary HTN 44mmhg.  -cardiology consulted Dr Louie  - stress test -ve.  Secondary Diagnosis:	CAD (coronary artery disease)  Assessment and plan of treatment:	c/w clopidogrel , metoprolol and simvastatin. Principal Discharge DX:	Consolidation lung  Goal:	to treat Pneumonia and improve respiratory functions  Assessment and plan of treatment:	- You presented with SOB and left sided pleuritic chest pain  - Chest Xray showed left lower lobe lung collapse   -elevated wbc count to 12k with left shift, worsening cough and sputum production.  - Your symptoms improve after coughing the mucous plug  - you were on Zosyn  - Pulmonary consuled Dr em  - CT shows left lobe consolidation and occluded left main bronchus  repeat CT Showed Small left pleural effusion with basilar atelectasis or consolidation.  Indeterminate lesion in the posterior left interpolar region measuring   2.4 cm.   - You need to Consider further evaluation with MRI abdomen with your PCP as outpatient  - You need to continue on Levaquin 250 mg orally every day till 8/14/18.  - You need to follow up with your Primary Care Physician in 1 week.  - You need to continue on your medications as prescribed  Secondary Diagnosis:	Hypertension  Assessment and plan of treatment:	- You have a history of Hypertension   - You need to continue on Clonidine ,hydralazine and metoprolol  - You need to follow up with your Primary Care Physician in 1 week.  Secondary Diagnosis:	CKD (chronic kidney disease)  Assessment and plan of treatment:	-Likely secondary to uncontrolled HTN  Secondary Diagnosis:	COPD exacerbation  Assessment and plan of treatment:	-SOB and wheezing on examination, cough, sputum production  - You need to continue on prednisone  4 tabs x 10 mg for 3 days then     3 tabs x 10 mg for 3 days then   2 tabs x 10 mg for 3 days then   1 tab x 10 mg for 3 days then stop  - You need to continue on your medications as prescribed  Secondary Diagnosis:	Acute on chronic congestive heart failure, unspecified heart failure type  Assessment and plan of treatment:	Acute on chronic congestive heart failure, unspecified heart failure type.  Plan: -elevated pro-BNP ~7000  -ECHO recently in July 2018 showed EF >55% with mild Pulmonary HTN 44mmhg.  -cardiology consulted Dr Louie  - stress test -ve.  - You need to continue on lasix 40 mg  Secondary Diagnosis:	CAD (coronary artery disease)  Assessment and plan of treatment:	- You need to continue on clopidogrel , metoprolol and simvastatin.

## 2018-08-07 NOTE — PROGRESS NOTE ADULT - SUBJECTIVE AND OBJECTIVE BOX
PGY1 Note discussed with Supervising Resident and Primary Attending.    Patient is a 85y old  Male who presents with a chief complaint of SOB (07 Aug 2018 12:40)      INTERVAL HPI/OVERNIGHT EVENTS :    MEDICATIONS  (STANDING):  acetylcysteine 20% Inhalation 3 milliLiter(s) Inhalation every 6 hours  ALBUTerol/ipratropium for Nebulization 3 milliLiter(s) Nebulizer every 6 hours  cloNIDine 0.3 milliGRAM(s) Oral three times a day  clopidogrel Tablet 75 milliGRAM(s) Oral daily  enoxaparin Injectable 30 milliGRAM(s) SubCutaneous daily  hydrALAZINE 25 milliGRAM(s) Oral every 8 hours  methylPREDNISolone sodium succinate Injectable 40 milliGRAM(s) IV Push every 8 hours  metoprolol succinate ER 50 milliGRAM(s) Oral daily  montelukast 10 milliGRAM(s) Oral daily  pantoprazole  Injectable 40 milliGRAM(s) IV Push daily  piperacillin/tazobactam IVPB. 3.375 Gram(s) IV Intermittent every 12 hours  simvastatin 10 milliGRAM(s) Oral at bedtime    MEDICATIONS  (PRN):  oxyCODONE    5 mG/acetaminophen 325 mG 1 Tablet(s) Oral every 6 hours PRN Severe Pain (7 - 10)      Allergies    aspirin (Anaphylaxis)    Intolerances        REVIEW OF SYSTEMS :  * CONSTITUTIONAL      : No Fever, Weight loss, or Fatigue  * EYES                             : No eye pain , Visual disturbances or Discharge  * RESPIRATORY             : No Cough, Wheezing, Chills or Hemoptysis; No shortness of breath  * CARDIOVASCULAR     : No Chest pain, Palpitations, Dizziness, or Leg swelling  * GASTROINTESTINAL  : No Abdominal or Epigastric pain. No Nausea, Vomiting or Hematemesis; No Diarrhea or Constipation. No Melena or Hematochezia.  * GENITOURINARY        : No Dysuria , Frequency , Haematuria   * NEUROLOGICAL          : No Headaches, Memory loss, Loss of trength, Numbness, or Tremors  * MUSCULOSKELETAL   : No Joint pain  * PSYCHIATRY                 : No Depression or Anxiety   * HEME/LYMPH              : No Easy Bruising or Bleeding gums  * SKIN                               : No Itching, Burning, Rashes, or Lesions     Vital Signs Last 24 Hrs  T(C): 36.4 (07 Aug 2018 11:52), Max: 37.1 (06 Aug 2018 16:38)  T(F): 97.6 (07 Aug 2018 11:52), Max: 98.7 (06 Aug 2018 16:38)  HR: 65 (07 Aug 2018 11:52) (60 - 69)  BP: 112/42 (07 Aug 2018 11:52) (112/42 - 158/57)  BP(mean): --  RR: 16 (07 Aug 2018 11:52) (16 - 18)  SpO2: 99% (07 Aug 2018 11:52) (97% - 99%)    PHYSICAL EXAM :  * GENERAL                 : NAD, Well-groomed, Well-developed  * HEAD                       :  Atraumatic, Normocephalic  * EYES                         : EOMI, PERRLA, Conjunctiva and Sclera clear  * ENT                           : Moist Mucous Membranes  * NECK                         : Supple, No JVD, Normal Thyroid  * CHEST/LUNG           : Clear to Auscultation bilaterally; No Rales, Rhonchi, Wheezing or Rubs  * HEART                       : Regular Rate and Rhythm; No murmurs, Rubs or gallops  * ABDOMEN                : Soft, Non-tender, Non-distended; Bowel Sounds present  * NERVOUS SYSTEM  :  Alert & Oriented X3, Good Concentration; Motor Strength 5/5 B/L UL LL ; DTRs 2+ Intact and Symmetric  * EXTREMITIES            :  2+ Peripheral Pulses, No clubbing, cyanosis, or edema  * SKIN                           : No Rashes or Lesions    LABS:                          9.9    11.6  )-----------( 182      ( 07 Aug 2018 07:33 )             29.9     08-07    143  |  106  |  71<H>  ----------------------------<  183<H>  4.1   |  28  |  2.96<H>    Ca    9.4      07 Aug 2018 07:33  Phos  3.7     08-07  Mg     2.7     08-07    TPro  6.7  /  Alb  3.0<L>  /  TBili  0.7  /  DBili  x   /  AST  12  /  ALT  15  /  AlkPhos  59  08-07    PT/INR - ( 05 Aug 2018 13:00 )   PT: 11.3 sec;   INR: 1.04 ratio         PTT - ( 05 Aug 2018 13:00 )  PTT:27.3 sec    CAPILLARY BLOOD GLUCOSE          RADIOLOGY & ADDITIONAL TESTS:   No radiological imaging was required    Imaging Personally Reviewed   :  [ ] YES  [ ] NO    Consultant(s) Notes Reviewed :  [ ] YES  [ ] NO PGY1 Note discussed with Supervising Resident and Primary Attending.    Patient is a 85y old  Male who presents with a chief complaint of SOB (07 Aug 2018 12:40)      INTERVAL HPI/OVERNIGHT EVENTS : pt is seen at the bedside. he reports feeling well and denies any complaints at this time.      MEDICATIONS  (STANDING):  acetylcysteine 20% Inhalation 3 milliLiter(s) Inhalation every 6 hours  ALBUTerol/ipratropium for Nebulization 3 milliLiter(s) Nebulizer every 6 hours  cloNIDine 0.3 milliGRAM(s) Oral three times a day  clopidogrel Tablet 75 milliGRAM(s) Oral daily  enoxaparin Injectable 30 milliGRAM(s) SubCutaneous daily  hydrALAZINE 25 milliGRAM(s) Oral every 8 hours  methylPREDNISolone sodium succinate Injectable 40 milliGRAM(s) IV Push every 8 hours  metoprolol succinate ER 50 milliGRAM(s) Oral daily  montelukast 10 milliGRAM(s) Oral daily  pantoprazole  Injectable 40 milliGRAM(s) IV Push daily  piperacillin/tazobactam IVPB. 3.375 Gram(s) IV Intermittent every 12 hours  simvastatin 10 milliGRAM(s) Oral at bedtime    MEDICATIONS  (PRN):  oxyCODONE    5 mG/acetaminophen 325 mG 1 Tablet(s) Oral every 6 hours PRN Severe Pain (7 - 10)      Allergies    aspirin (Anaphylaxis)    Intolerances        REVIEW OF SYSTEMS :  * CONSTITUTIONAL      : No Fever, Weight loss, or Fatigue  * EYES                             : No eye pain , Visual disturbances or Discharge  * RESPIRATORY             : No Cough, Wheezing, Chills or Hemoptysis; No shortness of breath  * CARDIOVASCULAR     : No Chest pain, Palpitations, Dizziness, or Leg swelling  * GASTROINTESTINAL  : No Abdominal or Epigastric pain. No Nausea, Vomiting or Hematemesis; No Diarrhea or Constipation. No Melena or Hematochezia.  * GENITOURINARY        : No Dysuria , Frequency , Haematuria   * NEUROLOGICAL          : No Headaches, Memory loss, Loss of trength, Numbness, or Tremors  * MUSCULOSKELETAL   : No Joint pain  * PSYCHIATRY                 : No Depression or Anxiety   * HEME/LYMPH              : No Easy Bruising or Bleeding gums  * SKIN                               : No Itching, Burning, Rashes, or Lesions     Vital Signs Last 24 Hrs  T(C): 36.4 (07 Aug 2018 11:52), Max: 37.1 (06 Aug 2018 16:38)  T(F): 97.6 (07 Aug 2018 11:52), Max: 98.7 (06 Aug 2018 16:38)  HR: 65 (07 Aug 2018 11:52) (60 - 69)  BP: 112/42 (07 Aug 2018 11:52) (112/42 - 158/57)  BP(mean): --  RR: 16 (07 Aug 2018 11:52) (16 - 18)  SpO2: 99% (07 Aug 2018 11:52) (97% - 99%)    PHYSICAL EXAM :  * GENERAL                 : NAD, Well-groomed, Well-developed  * HEAD                       :  Atraumatic, Normocephalic  * EYES                         : EOMI, PERRLA, Conjunctiva and Sclera clear  * ENT                           : Moist Mucous Membranes  * NECK                         : Supple, No JVD, Normal Thyroid  * CHEST/LUNG           : Clear to Auscultation bilaterally; No Rales, Rhonchi, Wheezing or Rubs  * HEART                       : Regular Rate and Rhythm; No murmurs, Rubs or gallops  * ABDOMEN                : Soft, Non-tender, Non-distended; Bowel Sounds present  * NERVOUS SYSTEM  :  Alert & Oriented X3, Good Concentration; Motor Strength 5/5 B/L UL LL ; DTRs 2+ Intact and Symmetric  * EXTREMITIES            :  2+ Peripheral Pulses, No clubbing, cyanosis, or edema  * SKIN                           : No Rashes or Lesions    LABS:                          9.9    11.6  )-----------( 182      ( 07 Aug 2018 07:33 )             29.9     08-07    143  |  106  |  71<H>  ----------------------------<  183<H>  4.1   |  28  |  2.96<H>    Ca    9.4      07 Aug 2018 07:33  Phos  3.7     08-07  Mg     2.7     08-07    TPro  6.7  /  Alb  3.0<L>  /  TBili  0.7  /  DBili  x   /  AST  12  /  ALT  15  /  AlkPhos  59  08-07    PT/INR - ( 05 Aug 2018 13:00 )   PT: 11.3 sec;   INR: 1.04 ratio         PTT - ( 05 Aug 2018 13:00 )  PTT:27.3 sec    CAPILLARY BLOOD GLUCOSE          RADIOLOGY & ADDITIONAL TESTS:   No radiological imaging was required    Imaging Personally Reviewed   :  [ ] YES  [ ] NO    Consultant(s) Notes Reviewed :  [ ] YES  [ ] NO

## 2018-08-07 NOTE — DISCHARGE NOTE ADULT - PATIENT PORTAL LINK FT
You can access the [facility] Portal, offered by [facility], by registering with the following website: [email or web address]

## 2018-08-07 NOTE — PROGRESS NOTE ADULT - SUBJECTIVE AND OBJECTIVE BOX
Patient is a 85y old  Male who presents with a chief complaint of SOB (05 Aug 2018 17:46)    pt seen in tele [x  ], reg med floor [   ], bed [x  ], chair at bedside [   ], a+o x3 [x  ], lethargic [  ],  nad [ x ]        Allergies    aspirin (Anaphylaxis)        Vitals    T(F): 97.9 (08-07-18 @ 08:14), Max: 98.7 (08-06-18 @ 16:38)  HR: 69 (08-07-18 @ 08:14) (60 - 69)  BP: 155/50 (08-07-18 @ 08:14) (128/46 - 158/57)  RR: 16 (08-07-18 @ 08:14) (16 - 18)  SpO2: 98% (08-07-18 @ 08:14) (97% - 99%)  Wt(kg): --  CAPILLARY BLOOD GLUCOSE          Labs                          9.3    7.3   )-----------( 180      ( 06 Aug 2018 14:26 )             28.2       08-07    143  |  106  |  71<H>  ----------------------------<  183<H>  4.1   |  28  |  2.96<H>    Ca    9.4      07 Aug 2018 07:33  Phos  3.7     08-07  Mg     2.7     08-07    TPro  6.7  /  Alb  3.0<L>  /  TBili  0.7  /  DBili  x   /  AST  12  /  ALT  15  /  AlkPhos  59  08-07      CARDIAC MARKERS ( 05 Aug 2018 13:00 )  0.033 ng/mL / x     / 20 U/L / x     / x                Radiology Results      Meds    MEDICATIONS  (STANDING):  acetylcysteine 20% Inhalation 3 milliLiter(s) Inhalation every 6 hours  ALBUTerol/ipratropium for Nebulization 3 milliLiter(s) Nebulizer every 6 hours  cloNIDine 0.3 milliGRAM(s) Oral three times a day  clopidogrel Tablet 75 milliGRAM(s) Oral daily  enoxaparin Injectable 30 milliGRAM(s) SubCutaneous daily  hydrALAZINE 25 milliGRAM(s) Oral every 8 hours  methylPREDNISolone sodium succinate Injectable 40 milliGRAM(s) IV Push every 8 hours  metoprolol succinate ER 50 milliGRAM(s) Oral daily  montelukast 10 milliGRAM(s) Oral daily  pantoprazole  Injectable 40 milliGRAM(s) IV Push daily  piperacillin/tazobactam IVPB. 3.375 Gram(s) IV Intermittent every 12 hours  simvastatin 10 milliGRAM(s) Oral at bedtime      MEDICATIONS  (PRN):  oxyCODONE    5 mG/acetaminophen 325 mG 1 Tablet(s) Oral every 6 hours PRN Severe Pain (7 - 10)      Physical Exam    Neuro :  no focal deficits  Respiratory: CTA B/L  CV: RRR, S1S2, no murmurs,   Abdominal: Soft, NT, ND +BS,  Extremities: No edema, + peripheral pulses    ASSESSMENT    Pleural effusion, not elsewhere classified  Stented coronary artery  Pacemaker  CKD (chronic kidney disease)  Hypertension  AICD (automatic cardioverter/defibrillator) present  CAD (coronary artery disease)  Kidney carcinoma  Prostate cancer  Kidney carcinoma  Hernia  Prostate      PLAN Patient is a 85y old  Male who presents with a chief complaint of SOB (05 Aug 2018 17:46)    pt seen in tele [x  ], reg med floor [   ], bed [x  ], chair at bedside [   ], a+o x3 [x  ], lethargic [  ],  nad [ x ]        Allergies    aspirin (Anaphylaxis)        Vitals    T(F): 97.9 (08-07-18 @ 08:14), Max: 98.7 (08-06-18 @ 16:38)  HR: 69 (08-07-18 @ 08:14) (60 - 69)  BP: 155/50 (08-07-18 @ 08:14) (128/46 - 158/57)  RR: 16 (08-07-18 @ 08:14) (16 - 18)  SpO2: 98% (08-07-18 @ 08:14) (97% - 99%)  Wt(kg): --  CAPILLARY BLOOD GLUCOSE          Labs                          9.3    7.3   )-----------( 180      ( 06 Aug 2018 14:26 )             28.2       08-07    143  |  106  |  71<H>  ----------------------------<  183<H>  4.1   |  28  |  2.96<H>    Ca    9.4      07 Aug 2018 07:33  Phos  3.7     08-07  Mg     2.7     08-07    TPro  6.7  /  Alb  3.0<L>  /  TBili  0.7  /  DBili  x   /  AST  12  /  ALT  15  /  AlkPhos  59  08-07      CARDIAC MARKERS ( 05 Aug 2018 13:00 )  0.033 ng/mL / x     / 20 U/L / x     / x                Radiology Results      Meds    MEDICATIONS  (STANDING):  acetylcysteine 20% Inhalation 3 milliLiter(s) Inhalation every 6 hours  ALBUTerol/ipratropium for Nebulization 3 milliLiter(s) Nebulizer every 6 hours  cloNIDine 0.3 milliGRAM(s) Oral three times a day  clopidogrel Tablet 75 milliGRAM(s) Oral daily  enoxaparin Injectable 30 milliGRAM(s) SubCutaneous daily  hydrALAZINE 25 milliGRAM(s) Oral every 8 hours  methylPREDNISolone sodium succinate Injectable 40 milliGRAM(s) IV Push every 8 hours  metoprolol succinate ER 50 milliGRAM(s) Oral daily  montelukast 10 milliGRAM(s) Oral daily  pantoprazole  Injectable 40 milliGRAM(s) IV Push daily  piperacillin/tazobactam IVPB. 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  Pleural effusion, not elsewhere classified  h/o Stented coronary artery  Pacemaker  CKD (chronic kidney disease)  Hypertension  AICD (automatic cardioverter/defibrillator) present  CAD (coronary artery disease)  Kidney carcinoma  Prostate cancer  Kidney carcinoma  Hernia  Prostate      PLAN    cont zosyn  id f/u  f/u blood cx  pulm f/u  cont bronchodilators  add mucomyst nebs q6  cont supplement O2  cardio f/u   awaiting ST results   hold lasix for now  repeat chest xray in am  cont current meds Patient is a 85y old  Male who presents with a chief complaint of SOB (05 Aug 2018 17:46)    pt seen in tele [x  ], reg med floor [   ], bed [x  ], chair at bedside [   ], a+o x3 [x  ], lethargic [  ],  nad [ x ]        Allergies    aspirin (Anaphylaxis)        Vitals    T(F): 97.9 (08-07-18 @ 08:14), Max: 98.7 (08-06-18 @ 16:38)  HR: 69 (08-07-18 @ 08:14) (60 - 69)  BP: 155/50 (08-07-18 @ 08:14) (128/46 - 158/57)  RR: 16 (08-07-18 @ 08:14) (16 - 18)  SpO2: 98% (08-07-18 @ 08:14) (97% - 99%)  Wt(kg): --  CAPILLARY BLOOD GLUCOSE          Labs                          9.3    7.3   )-----------( 180      ( 06 Aug 2018 14:26 )             28.2       08-07    143  |  106  |  71<H>  ----------------------------<  183<H>  4.1   |  28  |  2.96<H>    Ca    9.4      07 Aug 2018 07:33  Phos  3.7     08-07  Mg     2.7     08-07    TPro  6.7  /  Alb  3.0<L>  /  TBili  0.7  /  DBili  x   /  AST  12  /  ALT  15  /  AlkPhos  59  08-07      CARDIAC MARKERS ( 05 Aug 2018 13:00 )  0.033 ng/mL / x     / 20 U/L / x     / x                Radiology Results      Meds    MEDICATIONS  (STANDING):  acetylcysteine 20% Inhalation 3 milliLiter(s) Inhalation every 6 hours  ALBUTerol/ipratropium for Nebulization 3 milliLiter(s) Nebulizer every 6 hours  cloNIDine 0.3 milliGRAM(s) Oral three times a day  clopidogrel Tablet 75 milliGRAM(s) Oral daily  enoxaparin Injectable 30 milliGRAM(s) SubCutaneous daily  hydrALAZINE 25 milliGRAM(s) Oral every 8 hours  methylPREDNISolone sodium succinate Injectable 40 milliGRAM(s) IV Push every 8 hours  metoprolol succinate ER 50 milliGRAM(s) Oral daily  montelukast 10 milliGRAM(s) Oral daily  pantoprazole  Injectable 40 milliGRAM(s) IV Push daily  piperacillin/tazobactam IVPB. 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

## 2018-08-07 NOTE — DISCHARGE NOTE ADULT - HOSPITAL COURSE
Patient is a 82 y/o male from home, ambulates with a cane with PMHX of CAD, + Stent placed in 2009 on Plavix QOD, AICD placed in 2011 then removed as it got infected, later had a right sided pacemaker placed in dec 2017 for bradycardia , Prostate CA, S/P surgery and RTX, Rt Nephrectomy for Rt Renal cell CA, Htn, HLD  copd, CKD ( baseline creatinine is 2.0) , Bradycardia (s/p Medtronic AICD), CKD S/P Colonoscopy in March 2015 c/w Diverticulosis , HX of Polyps removed, PVD ( s/p balloon left leg stent) came with complain of dyspnea and dry cough x 1 day. Patient states he is developing worsening shortness of breath since he was discharged ..    Patient is a 82 y/o male from home, ambulates with a cane with PMHX of CAD, + Stent placed in 2009 on Plavix QOD, AICD placed in 2011 then removed as it got infected, later had a right sided pacemaker placed in dec 2017 for bradycardia , Prostate CA, S/P surgery and RTX, Rt Nephrectomy for Rt Renal cell CA, Htn, HLD  copd, CKD ( baseline creatinine is 2.0) , Bradycardia (s/p Medtronic AICD), CKD S/P Colonoscopy in March 2015 c/w Diverticulosis , HX of Polyps removed, PVD ( s/p balloon left leg stent) came with complain of dyspnea and dry cough x 1 day. Patient states he is developing worsening shortness of breath since he was discharged yesterday. He was Rx at [facility] for CHF exacerbation, foreign body inhalation complicated by vocal cord inflammation. he was intubated and then self extubated himself in 24 hours. he was safely downgraded to floor and was rx with IV steroids. He said he was doing fine till Friday when he walked with Physical therapy and was discharged to Home yesterday, by the time he reached Home he had worsening cough and left sided pleuritic chest pain. His blurry vision is now improved though. Patient reported weight loss of 20 lbs in past couple of months. He denies  fever, chills, nausea, vomiting, diarrhea, constipation or any other complains.     In ED, patient's vital signs were remarkable for BP of 181/74, HR of 89, Temp normal, Labs were significant for elevated wbc count of 12k with left shift, HB 10.6.  Cr: 2.89. EKG showed NSR, RBBB, No ST-T wave changes, paced rhythm. Received IV Lasix 40 x 1 in ED, Solumedrol 125mg x 1 and Duoneb. Patient had CT chest which shows The lung windows demonstrate volume loss of the left lung with mediastinal shift to the left. 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 The left lower lobe is entirely consolidated. There is apparent normal aeration of the left upper lobe indicating the occlusion of the left bronchus may have occurred recently.  Prominent lymph nodes are seen in the left paratracheal space. off note: Patient sees pulmonologist Dr Belle who performed a CT chest 3 months ago, and saw a small lesion. Patient was informed that he might have some malignant lesion, but its too small to be biopsied and was advised to get follow up CT scans in futures.    CT shows left lobe consolidation and occluded left main bronchus  CXR   stress test -ve Patient is a 80 y/o male from home, ambulates with a cane with PMHX of CAD, + Stent placed in 2009 on Plavix QOD, AICD placed in 2011 then removed as it got infected, later had a right sided pacemaker placed in dec 2017 for bradycardia , Prostate CA, S/P surgery and RTX, Rt Nephrectomy for Rt Renal cell CA, Htn, HLD  copd, CKD ( baseline creatinine is 2.0) , Bradycardia (s/p Medtronic AICD), CKD S/P Colonoscopy in March 2015 c/w Diverticulosis , HX of Polyps removed, PVD ( s/p balloon left leg stent) came with complain of dyspnea and dry cough x 1 day. Patient states he is developing worsening shortness of breath since he was discharged yesterday. He was Rx at [facility] for CHF exacerbation, foreign body inhalation complicated by vocal cord inflammation. he was intubated and then self extubated himself in 24 hours. he was safely downgraded to floor and was rx with IV steroids. He said he was doing fine till Friday when he walked with Physical therapy and was discharged to Home yesterday, by the time he reached Home he had worsening cough and left sided pleuritic chest pain. His blurry vision is now improved though. Patient reported weight loss of 20 lbs in past couple of months. He denies  fever, chills, nausea, vomiting, diarrhea, constipation or any other complains.   In ED, patient's vital signs were remarkable for BP of 181/74, HR of 89, Temp normal, Labs were significant for elevated wbc count of 12k with left shift, HB 10.6.  Cr: 2.89. EKG showed NSR, RBBB, No ST-T wave changes, paced rhythm. Received IV Lasix 40 x 1 in ED, Solumedrol 125mg x 1 and Duoneb. Patient had CT chest which shows The lung windows demonstrate volume loss of the left lung with mediastinal shift to the left. 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 The left lower lobe is entirely consolidated. There is apparent normal aeration of the left upper lobe indicating the occlusion of the left bronchus may have occurred recently.  Prominent lymph nodes are seen in the left paratracheal space. off note: Patient sees pulmonologist Dr Belle who performed a CT chest 3 months ago, and saw a small lesion. Patient was informed that he might have some malignant lesion, but its too small to be biopsied and was advised to get follow up CT scans in futures.  CT shows left lobe consolidation and occluded left main bronchus .Chest Xray showed left lower lobe lung collapse  . elevated wbc count to 12k with left shift, worsening cough and sputum production. symptoms improve after coughing the mucous plug  he was on Zosyn. Pulmonary consuled Dr em . CT shows left lobe consolidation and occluded left main bronchus  repeat CT Showed Small left pleural effusion with basilar atelectasis or consolidation.  Indeterminate lesion in the posterior left interpolar region measuring   2.4 cm. stress test was -ve   Given patient's improved clinical status and current hemodynamic stability, decision was made to discharge.  Please refer to patient's complete medical chart with documents for a full hospital course, for this is only a brief summary. Patient is a 82 y/o male from home, ambulates with a cane with PMHX of CAD, + Stent placed in 2009 on Plavix QOD, AICD placed in 2011 then removed as it got infected, later had a right sided pacemaker placed in dec 2017 for bradycardia , Prostate CA, S/P surgery and RTX, Rt Nephrectomy for Rt Renal cell CA, Htn, HLD  copd, CKD ( baseline creatinine is 2.0) , Bradycardia (s/p Medtronic AICD), CKD S/P Colonoscopy in March 2015 c/w Diverticulosis , HX of Polyps removed, PVD ( s/p balloon left leg stent) came with complain of dyspnea and dry cough x 1 day. Patient states he is developing worsening shortness of breath since he was discharged yesterday. 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 he was intubated and then self extubated himself in 24 hours. he was safely downgraded to floor and was rx with IV steroids. He said he was doing fine till Friday when he walked with Physical therapy and was discharged to Home yesterday, by the time he reached Home he had worsening cough and left sided pleuritic chest pain. His blurry vision is now improved though. Patient reported weight loss of 20 lbs in past couple of months. He denies  fever, chills, nausea, vomiting, diarrhea, constipation or any other complains.   In ED, patient's vital signs were remarkable for BP of 181/74, HR of 89, Temp normal, Labs were significant for elevated wbc count of 12k with left shift, HB 10.6.  Cr: 2.89. EKG showed NSR, RBBB, No ST-T wave changes, paced rhythm. Received IV Lasix 40 x 1 in ED, Solumedrol 125mg x 1 and Duoneb. Patient had CT chest which shows The lung windows demonstrate volume loss of the left lung with mediastinal shift to the left. 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 The left lower lobe is entirely consolidated. There is apparent normal aeration of the left upper lobe indicating the occlusion of the left bronchus may have occurred recently.  Prominent lymph nodes are seen in the left paratracheal space. off note: Patient sees pulmonologist Dr Belle who performed a CT chest 3 months ago, and saw a small lesion. Patient was informed that he might have some malignant lesion, but its too small to be biopsied and was advised to get follow up CT scans in futures.  CT shows left lobe consolidation and occluded left main bronchus .Chest Xray showed left lower lobe lung collapse  . elevated wbc count to 12k with left shift, worsening cough and sputum production. symptoms improve after coughing the mucous plug  he was on Zosyn. Pulmonary consuled Dr Belle . CT shows left lobe consolidation and occluded left main bronchus  repeat CT Showed Small left pleural effusion with basilar atelectasis or consolidation.  Indeterminate lesion in the posterior left interpolar region measuring   2.4 cm. stress test was -ve   Given patient's improved clinical status and current hemodynamic stability, decision was made to discharge.  Please refer to patient's complete medical chart with documents for a full hospital course, for this is only a brief summary.

## 2018-08-07 NOTE — DISCHARGE NOTE ADULT - CARE PROVIDER_API CALL
Lizandro Domingo), Internal Medicine  [address] [place] [address]  Phone: [phone]  Fax: [phone] Durga Belle), Critical Care Medicine; Internal Medicine; Pulmonary Disease  [address] [place] [address]  Phone: [phone]  Fax: [phone]

## 2018-08-07 NOTE — DIETITIAN INITIAL EVALUATION ADULT. - FACTORS AFF FOOD INTAKE
Muslim/ethnic/cultural/personal food preferences Latter-day/ethnic/cultural/personal food preferences/poor dentition, missing teeth, but tolerating soft food per pt

## 2018-08-07 NOTE — DISCHARGE NOTE ADULT - NSTOBACCOHOTLINE_GEN_A_CS
[facility] Smokers Quitline ([phone]-HD-[phone]) [facility] Smokers Quitline ([phone]-ZK-[phone])

## 2018-08-07 NOTE — DIETITIAN INITIAL EVALUATION ADULT. - ETIOLOGY
acute on chronic illness with hospitalizations, individual/personal food preferences, poor dentition

## 2018-08-07 NOTE — PROGRESS NOTE ADULT - PROBLEM SELECTOR PLAN 5
-elevated pro-BNP ~7000  -s/p 1 dose lasix 40 IV  -hold on further Lasix as patient looks dehydrated on examination  -ECHO recently in July 2018 showed EF >55% with mild Pulmonary HTN 44mmhg.  -cardiology consulted Dr Louie -elevated pro-BNP ~7000  -s/p 1 dose lasix 40 IV  -hold on further Lasix as patient looks dehydrated on examination  -ECHO recently in July 2018 showed EF >55% with mild Pulmonary HTN 44mmhg.  -cardiology consulted Dr Louie  stress test -ve

## 2018-08-07 NOTE — DISCHARGE NOTE ADULT - MEDICATION SUMMARY - MEDICATIONS TO TAKE
I will START or STAY ON the medications listed below when I get home from the hospital:    predniSONE 10 mg oral tablet  -- You need to take  4 tabs x 10 mg for 3 days then     3 tabs x 10 mg for 3 days then   2 tabs x 10 mg for 3 days then   1 tab x 10 mg for 3 days then stop  -- Indication: For COPD exacerbation    acetaminophen 325 mg oral tablet  -- 2 tab(s) by mouth every 6 hours, As needed, headache  -- Indication: For Pain    cloNIDine 0.3 mg oral tablet  -- 1 tab(s) by mouth 3 times a day  -- Indication: For Hypertension    simvastatin 10 mg oral tablet  -- 1 tab(s) by mouth once a day (at bedtime)  -- Indication: For Hyperlipidemia    clopidogrel 75 mg oral tablet  -- 1 tab(s) by mouth once a day  -- Indication: For Prophylactic measure    metoprolol succinate 50 mg oral tablet, extended release  -- 1 tab(s) by mouth once a day  -- Indication: For Hypertension    albuterol 90 mcg/inh inhalation aerosol with adapter  --  inhaled 4 times a day, As Needed  -- Indication: For COPD exacerbation    ProAir HFA 90 mcg/inh inhalation aerosol  -- 2 puff(s) inhaled 4 times a day, As Needed  -- Indication: For COPD exacerbation    furosemide 40 mg oral tablet  -- 1 tab(s) by mouth once a day  -- Indication: For Acute on chronic congestive heart failure, unspecified heart failure type    montelukast 10 mg oral tablet  -- 1 tab(s) by mouth once a day (at bedtime)  -- Indication: For COPD exacerbation    levoFLOXacin 250 mg oral tablet  -- 1 tab(s) by mouth once a day   -- Avoid prolonged or excessive exposure to direct and/or artificial sunlight while taking this medication.  Do not take dairy products, antacids, or iron preparations within one hour of this medication.  Finish all this medication unless otherwise directed by prescriber.  May cause drowsiness or dizziness.  Medication should be taken with plenty of water.    -- Indication: For Consolidation lung    hydrALAZINE 25 mg oral tablet  -- 1 tab(s) by mouth every 8 hours  -- Indication: For Hypertension    Centrum Silver Ultra Men's  --     -- Indication: For Supplement

## 2018-08-07 NOTE — DIETITIAN INITIAL EVALUATION ADULT. - OTHER INFO
Nutrition consult requested for pressure ulcer; lives home PTA; skin tear, pressure ulcer stage II; recent admission 7/29/18 to 8/4/18, was intubated, then self-extubated; readmitted for SOB; intake 75% per flow sheets; Nutrition consult requested for pressure ulcer; lives home PTA; skin tear, pressure ulcer stage II; recent admission 7/29/18 to 8/4/18, was intubated, then self-extubated; readmitted for SOB; intake 75% per flow sheets and pt reports, but may vary depending on food served, denied GI distress, chewing or swallowing problem at present, food choices obtained, wants to have nutritional supplements; wt loss of 20 lb x a couple of months per H&P, but pt denied recent wt loss, wt data from [facility] EMR reviewed, a bit fluctuated

## 2018-08-07 NOTE — DISCHARGE NOTE ADULT - SECONDARY DIAGNOSIS.
Hypertension CKD (chronic kidney disease) COPD exacerbation Acute on chronic congestive heart failure, unspecified heart failure type CAD (coronary artery disease)

## 2018-08-07 NOTE — PROGRESS NOTE ADULT - SUBJECTIVE AND OBJECTIVE BOX
Patient seen and examined.       MEDICATIONS  (STANDING):  acetylcysteine 20% Inhalation 3 milliLiter(s) Inhalation every 6 hours  ALBUTerol/ipratropium for Nebulization 3 milliLiter(s) Nebulizer every 6 hours  cloNIDine 0.3 milliGRAM(s) Oral three times a day  clopidogrel Tablet 75 milliGRAM(s) Oral daily  enoxaparin Injectable 30 milliGRAM(s) SubCutaneous daily  hydrALAZINE 25 milliGRAM(s) Oral every 8 hours  methylPREDNISolone sodium succinate Injectable 40 milliGRAM(s) IV Push every 8 hours  metoprolol succinate ER 50 milliGRAM(s) Oral daily  montelukast 10 milliGRAM(s) Oral daily  pantoprazole  Injectable 40 milliGRAM(s) IV Push daily  piperacillin/tazobactam IVPB. 3.375 Gram(s) IV Intermittent every 12 hours  simvastatin 10 milliGRAM(s) Oral at bedtime      MEDICATIONS  (PRN):  oxyCODONE    5 mG/acetaminophen 325 mG 1 Tablet(s) Oral every 6 hours PRN Severe Pain (7 - 10)   Medications up to date at time of exam.      PHYSICAL EXAMINATION:  Patient has no new complaints.  GENERAL: The patient is a well-developed, well-nourished, in no apparent distress.     Vital Signs Last 24 Hrs  T(C): 36.4 (07 Aug 2018 19:45), Max: 36.9 (07 Aug 2018 05:38)  T(F): 97.6 (07 Aug 2018 19:45), Max: 98.4 (07 Aug 2018 05:38)  HR: 62 (07 Aug 2018 19:45) (60 - 69)  BP: 131/49 (07 Aug 2018 19:45) (112/42 - 158/57)  BP(mean): --  RR: 18 (07 Aug 2018 19:45) (16 - 18)  SpO2: 98% (07 Aug 2018 19:45) (97% - 100%)   (if applicable)      HEENT: Head is normocephalic and atraumatic.     NECK: Supple, no palpable adenopathy.    LUNGS: Clear to auscultation, no wheezing, rales, or rhonchi.    HEART: Regular rate and rhythm without murmur.    ABDOMEN: Soft, nontender, and nondistended.  No hepatosplenomegaly is noted.    EXTREMITIES: Without any cyanosis, clubbing, rash, lesions or edema.    NEUROLOGIC: Awake, alert.    SKIN: Warm, dry, good turgor.      LABS:                        9.9    11.6  )-----------( 182      ( 07 Aug 2018 07:33 )             29.9     08-07    143  |  106  |  71<H>  ----------------------------<  183<H>  4.1   |  28  |  2.96<H>    Ca    9.4      07 Aug 2018 07:33  Phos  3.7     08-07  Mg     2.7     08-07    TPro  6.7  /  Alb  3.0<L>  /  TBili  0.7  /  DBili  x   /  AST  12  /  ALT  15  /  AlkPhos  59  08-07                Serum Pro-Brain Natriuretic Peptide: 7116 pg/mL (08-05-18 @ 13:00)          MICROBIOLOGY: (if applicable)    RADIOLOGY & ADDITIONAL STUDIES:  EKG:   CXR:  ECHO:    IMPRESSION: 85y Male PAST MEDICAL & SURGICAL HISTORY:  Stented coronary artery  Pacemaker  CKD (chronic kidney disease)  Hypertension  AICD (automatic cardioverter/defibrillator) present  CAD (coronary artery disease)  Kidney carcinoma: s/p right nephrectomy  Prostate cancer  Kidney carcinoma: s/p right nephrectomy  Hernia: Hernioplasty  Prostate: prostatectomy     RECOMMENDATIONS:  Patient presents with CP, SOB after being discharged. Was treated for HF exacerbation prior admission.  Stress test negative, would resume home dose lasix.

## 2018-08-07 NOTE — DIETITIAN INITIAL EVALUATION ADULT. - MD RECOMMEND
change diet to Soft Consistent CHO DASH diet, Add Glucerna Shake 1can bid as medically feasible (440kcal, 20g protein); fluid restriction as medically feasible

## 2018-08-07 NOTE — DISCHARGE NOTE ADULT - PLAN OF CARE
to treat Pneumonia and improve respiratory functions - You presented with SOB and left sided pleuritic chest pain  - Chest Xray showed left lower lobe lung collapse   -elevated wbc count to 12k with left shift, worsening cough and sputum production.  - Your symptoms improve after coughing the mucous plug  - you were on Zosyn  - Pulmonary consuled Dr em  - CT shows left lobe consolidation and occluded left main bronchus  repeat CT Showed Small left pleural effusion with basilar atelectasis or consolidation.  Indeterminate lesion in the posterior left interpolar region measuring   2.4 cm.   - You need to Consider further evaluation with MRI abdomen with your PCP as outpatient  - You need to continue on Levaquin 250 mg orally every day till 8/14/18.  - You need to follow up with your Primary Care Physician in 1 week.  - You need to continue on your medications as prescribed - You have a history of Hypertension   - You need to continue on Clonidine ,hydralazine and metoprolol  -monitor Bp.  -DASH diet.   - You need to follow up with your Primary Care Physician in 1 week.  - You need to continue on your medications as prescribed -Likely secondary to uncontrolled HTN  -patient kept euvolemic   -fluid restriction 1L daily,   - pt resumed on Lasix. -SOB and wheezing on examination, cough, sputum production  -c/w Solumedrol 40 mg iv Q8 hrs   -c/w Supplemental O2   -c/w Duoneb Q6 hrs. Acute on chronic congestive heart failure, unspecified heart failure type.  Plan: -elevated pro-BNP ~7000  -s/p 1 dose lasix 40 IV  -ECHO recently in July 2018 showed EF >55% with mild Pulmonary HTN 44mmhg.  -cardiology consulted Dr Louie  - stress test -ve. c/w clopidogrel , metoprolol and simvastatin. - You have a history of Hypertension   - You need to continue on Clonidine ,hydralazine and metoprolol  - You need to follow up with your Primary Care Physician in 1 week. -Likely secondary to uncontrolled HTN -SOB and wheezing on examination, cough, sputum production  - You need to continue on prednisone  4 tabs x 10 mg for 3 days then     3 tabs x 10 mg for 3 days then   2 tabs x 10 mg for 3 days then   1 tab x 10 mg for 3 days then stop  - You need to continue on your medications as prescribed Acute on chronic congestive heart failure, unspecified heart failure type.  Plan: -elevated pro-BNP ~7000  -ECHO recently in July 2018 showed EF >55% with mild Pulmonary HTN 44mmhg.  -cardiology consulted Dr Louie  - stress test -ve.  - You need to continue on lasix 40 mg - You need to continue on clopidogrel , metoprolol and simvastatin.

## 2018-08-07 NOTE — PROGRESS NOTE ADULT - PROBLEM SELECTOR PLAN 1
-Patient presented with SOB and left sided pleuritic chest pain  -CT shows left lobe consolidation and occluded left main bronchus  -elevated wbc count to 12k with left shift, worsening cough and sputum production.  -will start on Zosyn Q12 hrs for now.   -f/u procalcitonin  -Pulmonary consuled Dr em -Patient presented with SOB and left sided pleuritic chest pain  -CT shows left lobe consolidation and occluded left main bronchus  -elevated wbc count to 12k with left shift, worsening cough and sputum production.  -on Zosyn Q12 hrs for now.   -procalcitonin f/u  -Pulmonary consuled Dr em  CXR tomorrow AM

## 2018-08-07 NOTE — PROGRESS NOTE ADULT - ASSESSMENT
Patient is a 80 y/o male from home, ambulates with a cane with PMHX of CAD, + Stent placed in 2009 on Plavix QOD, AICD placed in 2011 then removed as it got infected, later had a right sided pacemaker placed in dec 2017 for bradycardia , Prostate CA, S/P surgery and RTX, Rt Nephrectomy for Rt Renal cell CA, Htn, HLD  copd, CKD ( baseline creatinine is 2.0) , Bradycardia (s/p Medtronic AICD), CKD S/P Colonoscopy in March 2015 c/w Diverticulosis , HX of Polyps removed, PVD ( s/p balloon left leg stent) came with complain of dyspnea and dry cough x 1 day. Patient states he is developing worsening shortness of breath since he was discharged yesterday. Patient is a 80 y/o male from home, ambulates with a cane with PMHX of CAD, + Stent placed in 2009 on Plavix QOD, AICD placed in 2011 then removed as it got infected, later had a right sided pacemaker placed in dec 2017 for bradycardia , Prostate CA, S/P surgery and RTX, Rt Nephrectomy for Rt Renal cell CA, Htn, HLD  copd, CKD ( baseline creatinine is 2.0) , Bradycardia (s/p Medtronic AICD), CKD S/P Colonoscopy in March 2015 c/w Diverticulosis , HX of Polyps removed, PVD ( s/p balloon left leg stent) came with complain of dyspnea and dry cough x 1 day. Patient states he is developing worsening shortness of breath since he was discharged ..  CT shows left lobe consolidation and occluded left main bronchus  CXR tomorrow   stress test -ve

## 2018-08-08 LAB
ALBUMIN SERPL ELPH-MCNC: 2.9 G/DL — LOW (ref 3.5–5)
ALP SERPL-CCNC: 55 U/L — SIGNIFICANT CHANGE UP (ref 40–120)
ALT FLD-CCNC: 14 U/L DA — SIGNIFICANT CHANGE UP (ref 10–60)
ANION GAP SERPL CALC-SCNC: 9 MMOL/L — SIGNIFICANT CHANGE UP (ref 5–17)
AST SERPL-CCNC: 12 U/L — SIGNIFICANT CHANGE UP (ref 10–40)
BASOPHILS # BLD AUTO: 0 K/UL — SIGNIFICANT CHANGE UP (ref 0–0.2)
BASOPHILS NFR BLD AUTO: 0.1 % — SIGNIFICANT CHANGE UP (ref 0–2)
BILIRUB SERPL-MCNC: 0.6 MG/DL — SIGNIFICANT CHANGE UP (ref 0.2–1.2)
BUN SERPL-MCNC: 70 MG/DL — HIGH (ref 7–18)
CALCIUM SERPL-MCNC: 9.3 MG/DL — SIGNIFICANT CHANGE UP (ref 8.4–10.5)
CHLORIDE SERPL-SCNC: 107 MMOL/L — SIGNIFICANT CHANGE UP (ref 96–108)
CO2 SERPL-SCNC: 27 MMOL/L — SIGNIFICANT CHANGE UP (ref 22–31)
CREAT SERPL-MCNC: 2.93 MG/DL — HIGH (ref 0.5–1.3)
EOSINOPHIL # BLD AUTO: 0 K/UL — SIGNIFICANT CHANGE UP (ref 0–0.5)
EOSINOPHIL NFR BLD AUTO: 0 % — SIGNIFICANT CHANGE UP (ref 0–6)
GLUCOSE SERPL-MCNC: 221 MG/DL — HIGH (ref 70–99)
HCT VFR BLD CALC: 28.7 % — LOW (ref 39–50)
HGB BLD-MCNC: 9.9 G/DL — LOW (ref 13–17)
LYMPHOCYTES # BLD AUTO: 0.5 K/UL — LOW (ref 1–3.3)
LYMPHOCYTES # BLD AUTO: 5.5 % — LOW (ref 13–44)
MAGNESIUM SERPL-MCNC: 2.7 MG/DL — HIGH (ref 1.6–2.6)
MCHC RBC-ENTMCNC: 30.8 PG — SIGNIFICANT CHANGE UP (ref 27–34)
MCHC RBC-ENTMCNC: 34.6 GM/DL — SIGNIFICANT CHANGE UP (ref 32–36)
MCV RBC AUTO: 88.9 FL — SIGNIFICANT CHANGE UP (ref 80–100)
MONOCYTES # BLD AUTO: 0.5 K/UL — SIGNIFICANT CHANGE UP (ref 0–0.9)
MONOCYTES NFR BLD AUTO: 4.8 % — SIGNIFICANT CHANGE UP (ref 2–14)
NEUTROPHILS # BLD AUTO: 8.7 K/UL — HIGH (ref 1.8–7.4)
NEUTROPHILS NFR BLD AUTO: 89.6 % — HIGH (ref 43–77)
PHOSPHATE SERPL-MCNC: 4.1 MG/DL — SIGNIFICANT CHANGE UP (ref 2.5–4.5)
PLATELET # BLD AUTO: 178 K/UL — SIGNIFICANT CHANGE UP (ref 150–400)
POTASSIUM SERPL-MCNC: 4.5 MMOL/L — SIGNIFICANT CHANGE UP (ref 3.5–5.3)
POTASSIUM SERPL-SCNC: 4.5 MMOL/L — SIGNIFICANT CHANGE UP (ref 3.5–5.3)
PROCALCITONIN SERPL-MCNC: 0.14 NG/ML — HIGH (ref 0.02–0.1)
PROT SERPL-MCNC: 6.5 G/DL — SIGNIFICANT CHANGE UP (ref 6–8.3)
RBC # BLD: 3.22 M/UL — LOW (ref 4.2–5.8)
RBC # FLD: 12.7 % — SIGNIFICANT CHANGE UP (ref 10.3–14.5)
SODIUM SERPL-SCNC: 143 MMOL/L — SIGNIFICANT CHANGE UP (ref 135–145)
WBC # BLD: 9.7 K/UL — SIGNIFICANT CHANGE UP (ref 3.8–10.5)
WBC # FLD AUTO: 9.7 K/UL — SIGNIFICANT CHANGE UP (ref 3.8–10.5)

## 2018-08-08 RX ORDER — FUROSEMIDE 40 MG
40 TABLET ORAL DAILY
Qty: 0 | Refills: 0 | Status: DISCONTINUED | OUTPATIENT
Start: 2018-08-08 | End: 2018-08-09

## 2018-08-08 RX ADMIN — MONTELUKAST 10 MILLIGRAM(S): 4 TABLET, CHEWABLE ORAL at 12:15

## 2018-08-08 RX ADMIN — MONTELUKAST 10 MILLIGRAM(S): 4 TABLET, CHEWABLE ORAL at 21:22

## 2018-08-08 RX ADMIN — CLOPIDOGREL BISULFATE 75 MILLIGRAM(S): 75 TABLET, FILM COATED ORAL at 12:14

## 2018-08-08 RX ADMIN — Medication 3 MILLILITER(S): at 21:00

## 2018-08-08 RX ADMIN — Medication 25 MILLIGRAM(S): at 21:22

## 2018-08-08 RX ADMIN — PIPERACILLIN AND TAZOBACTAM 25 GRAM(S): 4; .5 INJECTION, POWDER, LYOPHILIZED, FOR SOLUTION INTRAVENOUS at 17:54

## 2018-08-08 RX ADMIN — Medication 40 MILLIGRAM(S): at 17:53

## 2018-08-08 RX ADMIN — ENOXAPARIN SODIUM 30 MILLIGRAM(S): 100 INJECTION SUBCUTANEOUS at 12:14

## 2018-08-08 RX ADMIN — PANTOPRAZOLE SODIUM 40 MILLIGRAM(S): 20 TABLET, DELAYED RELEASE ORAL at 12:14

## 2018-08-08 RX ADMIN — Medication 25 MILLIGRAM(S): at 13:52

## 2018-08-08 RX ADMIN — Medication 25 MILLIGRAM(S): at 06:46

## 2018-08-08 RX ADMIN — Medication 50 MILLIGRAM(S): at 06:49

## 2018-08-08 RX ADMIN — Medication 0.3 MILLIGRAM(S): at 13:52

## 2018-08-08 RX ADMIN — Medication 0.3 MILLIGRAM(S): at 21:22

## 2018-08-08 RX ADMIN — Medication 3 MILLILITER(S): at 08:29

## 2018-08-08 RX ADMIN — Medication 40 MILLIGRAM(S): at 12:14

## 2018-08-08 RX ADMIN — Medication 3 MILLILITER(S): at 08:28

## 2018-08-08 RX ADMIN — Medication 40 MILLIGRAM(S): at 06:46

## 2018-08-08 RX ADMIN — PIPERACILLIN AND TAZOBACTAM 25 GRAM(S): 4; .5 INJECTION, POWDER, LYOPHILIZED, FOR SOLUTION INTRAVENOUS at 06:46

## 2018-08-08 RX ADMIN — Medication 3 MILLILITER(S): at 14:17

## 2018-08-08 RX ADMIN — Medication 0.3 MILLIGRAM(S): at 06:46

## 2018-08-08 RX ADMIN — SIMVASTATIN 10 MILLIGRAM(S): 20 TABLET, FILM COATED ORAL at 21:22

## 2018-08-08 NOTE — PROGRESS NOTE ADULT - SUBJECTIVE AND OBJECTIVE BOX
Patient is a 85y old  Male who presents with a chief complaint of SOB (07 Aug 2018 12:40)        pt seen in tele [x  ], reg med floor [   ], bed [x  ], chair at bedside [   ], a+o x3 [x  ], lethargic [  ],  nad [ x ]    Allergies    aspirin (Anaphylaxis)        Vitals    T(F): 97.8 (08-08-18 @ 09:12), Max: 97.8 (08-08-18 @ 09:12)  HR: 60 (08-08-18 @ 09:12) (60 - 65)  BP: 144/47 (08-08-18 @ 09:12) (112/42 - 178/62)  RR: 18 (08-08-18 @ 09:12) (16 - 18)  SpO2: 98% (08-08-18 @ 09:12) (98% - 100%)  Wt(kg): --  CAPILLARY BLOOD GLUCOSE          Labs                          9.9    9.7   )-----------( 178      ( 08 Aug 2018 07:37 )             28.7       08-08    143  |  107  |  70<H>  ----------------------------<  221<H>  4.5   |  27  |  2.93<H>    Ca    9.3      08 Aug 2018 07:37  Phos  4.1     08-08  Mg     2.7     08-08    TPro  6.5  /  Alb  2.9<L>  /  TBili  0.6  /  DBili  x   /  AST  12  /  ALT  14  /  AlkPhos  55  08-08          < from: Nuclear Stress Test-Pharmacologic (08.07.18 @ 04:30) >  IMPRESSIONS:Normal Study  * Non-diagnostic EKG portion of Lexiscan stress test.  * Review of raw data shows: The study is of good technical  quality.  * The left ventricle was normal in size. Normal myocardial  perfusion scan, with no evidence of infarction or  inducible ischemia.  * Post-stress resting myocardial perfusion gated SPECT  imaging was performed (LVEF = 69 %)    < end of copied text >            Radiology Results      Meds    MEDICATIONS  (STANDING):  acetylcysteine 20% Inhalation 3 milliLiter(s) Inhalation every 6 hours  ALBUTerol/ipratropium for Nebulization 3 milliLiter(s) Nebulizer every 6 hours  cloNIDine 0.3 milliGRAM(s) Oral three times a day  clopidogrel Tablet 75 milliGRAM(s) Oral daily  enoxaparin Injectable 30 milliGRAM(s) SubCutaneous daily  furosemide    Tablet 40 milliGRAM(s) Oral daily  hydrALAZINE 25 milliGRAM(s) Oral every 8 hours  methylPREDNISolone sodium succinate Injectable 40 milliGRAM(s) IV Push every 12 hours  metoprolol succinate ER 50 milliGRAM(s) Oral daily  montelukast 10 milliGRAM(s) Oral daily  pantoprazole  Injectable 40 milliGRAM(s) IV Push daily  piperacillin/tazobactam IVPB. 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

## 2018-08-08 NOTE — PROGRESS NOTE ADULT - ASSESSMENT
Patient is a 82 y/o male from home, ambulates with a cane with PMHX of CAD, + Stent placed in 2009 on Plavix QOD, AICD placed in 2011 then removed as it got infected, later had a right sided pacemaker placed in dec 2017 for bradycardia , Prostate CA, S/P surgery and RTX, Rt Nephrectomy for Rt Renal cell CA, Htn, HLD  copd, CKD ( baseline creatinine is 2.0) , Bradycardia (s/p Medtronic AICD), CKD S/P Colonoscopy in March 2015 c/w Diverticulosis , HX of Polyps removed, PVD ( s/p balloon left leg stent) came with complain of dyspnea and dry cough x 1 day. Patient states he is developing worsening shortness of breath since he was discharged ..  CT shows left lobe consolidation and occluded left main bronchus  CXR tomorrow   stress test -ve Patient is a 80 y/o male from home, ambulates with a cane with PMHX of CAD, + Stent placed in 2009 on Plavix QOD, AICD placed in 2011 then removed as it got infected, later had a right sided pacemaker placed in dec 2017 for bradycardia , Prostate CA, S/P surgery and RTX, Rt Nephrectomy for Rt Renal cell CA, Htn, HLD  copd, CKD ( baseline creatinine is 2.0) , Bradycardia (s/p Medtronic AICD), CKD S/P Colonoscopy in March 2015 c/w Diverticulosis , HX of Polyps removed, PVD ( s/p balloon left leg stent) came with complain of dyspnea and dry cough x 1 day. Patient states he is developing worsening shortness of breath since he was discharged ..  CT shows left lobe consolidation and occluded left main bronchus  CXR tomorrow   stress test -ve

## 2018-08-08 NOTE — PROGRESS NOTE ADULT - PROBLEM SELECTOR PLAN 6
c/w clopidogrel , metoprolol and simvastatin

## 2018-08-08 NOTE — PROGRESS NOTE ADULT - PROBLEM SELECTOR PLAN 1
-Patient presented with SOB and left sided pleuritic chest pain  -CT shows left lobe consolidation and occluded left main bronchus  -elevated wbc count to 12k with left shift, worsening cough and sputum production.  -on Zosyn Q12 hrs for now.   -procalcitonin f/u  -Pulmonary consuled Dr em  CXR tomorrow AM -Patient presented with SOB and left sided pleuritic chest pain  -CT shows left lobe consolidation and occluded left main bronchus  -elevated wbc count to 12k with left shift, worsening cough and sputum production.  -on Zosyn Q12 hrs for now.   -procalcitonin f/u  -Pulmonary consuled Dr em

## 2018-08-08 NOTE — PROGRESS NOTE ADULT - PROBLEM SELECTOR PLAN 3
-Likely secondary to uncontrolled HTN  -continue hold diuretics.   -Keep patient euvolemic and renal diet  -Avoid Nephrotoxic Meds/ Agents such as (NSAIDs, IV contrast, Aminoglycosides such as gentamicin, -Gadolinium contrast, Phosphate containing enemas, etc..)  -Adjust Medications according to eGFR  -f/u bmp daily  -c/w fluid restriction 1L daily, avoid diuretics -Likely secondary to uncontrolled HTN  -patient kept euvolemic   -fluid restriction 1L daily,   - pt resumed on Lasix

## 2018-08-08 NOTE — PROGRESS NOTE ADULT - SUBJECTIVE AND OBJECTIVE BOX
Meds:  piperacillin/tazobactam IVPB. 3.375 Gram(s) IV Intermittent every 12 hours    Allergies:  Allergies    aspirin (Anaphylaxis)    Intolerances      ROS  [  ] UNABLE TO ELICIT    General:  [  ] None  [  ] Fever  [  ] Chills  [ x ] Malaise    Skin:  [ x ] None [  ] Rash  [  ] Wound  [  ] Ulcer    HEENT:  [ x ] None  [  ] Sore Throat  [  ] Nasal congestion/ runny nose  [  ] Photophobia [  ] Neck pain      Chest:  [  ] None   [  ] SOB  [ x ] Cough  [  ] None    Cardiovascular:   [ x ] None  [  ] CP  [  ] Palpitation    Gastrointestinal:  [ x ] None  [  ] Abd pain   [  ] Nausea    [  ] Vomiting   [  ] Diarrhea	     Genitourinary:  [ x ] None [  ] Polyuria   [  ] Urgency  [  ] Frequency  [  ] Dysuria    [  ]  Hematuria       Musculoskeletal:  [  ] None [  ] Back Pain	[  ] Body aches  [  ] Joint pain  [ x ] Weakness     Neurological: [  ] None [  ]Dizziness  [  ]Visual Disturbance  [  ]Headaches   [ x ] Weakness          PHYSICAL EXAM:  Vital Signs Last 24 Hrs  T(C): 36.4 (08 Aug 2018 11:14), Max: 36.6 (08 Aug 2018 09:12)  T(F): 97.6 (08 Aug 2018 11:14), Max: 97.8 (08 Aug 2018 09:12)  HR: 60 (08 Aug 2018 11:14) (60 - 62)  BP: 174/62 (08 Aug 2018 11:14) (128/52 - 178/62)  BP(mean): --  RR: 18 (08 Aug 2018 11:14) (17 - 18)  SpO2: 100% (08 Aug 2018 11:14) (98% - 100%)    Constitutional:    HEENT: [ x ] Wnl  [  ] Pharyngeal congestion    Neck:  [ x ] Supple  [  ]Lymphadenopathy  [ x ] No JVD   [  ] JVD  [  ] Masses   [  ] WNL    CHEST/Respiratory:  [  ]Clear to auscultation  [ x ] Rales   [  ] Rhonchi   [  ] Wheezing     [  ] Chest Tenderness      Cardiovascular:  [ x ] Reg S1 S2   [  ] Irreg S1 S2   [ x ]No Murmur  [  ] +ve Murmurs  [  ]Systolic [  ]Diastolic      Abdomen:  [ x ] Soft  [ x ] No tendrerness  [  ] Tenderness  [  ] Organomegaly  [  ] ABD Distention  [  ] Rigidity                       [ x ] No Regidity                       [ x ] No Rebound Tenderness  [  ] No Guarding Rigidity  [  ] Rebound Tenderness[  ] Guarding Rigidity                          [ x ]  +ve Bowel Sounds  [  ] Decreased Bowel Sounds    [  ] Absent Bowel Sounds                            Extremities: [ x ] No edema [  ] Edema  [  ] Clubbing   [  ] Cyanosis                         [ x ] No Tender Calf muscles  [  ] Tender Calf muscles                        [ x ] Palpable peripheral pulses    Neurological: [ x ] Awake  [ x ] Alert  [ x ] Oriented  x  3                           [  ] Confused  [  ] Drowsy  [  ] respond to painful stimuli  [  ] Unresponsive    Skin:  [ x ] Intact [  ] Redness [  ] Thrombophlebitis  [  ] Rashes  [  ] Dry  [  ] Ulcers    Ortho:  [  ] Joint Swelling  [  ] Joint erythema [  ] Joint tenderness                [  ] Increased temp. to touch  [  ] DJD [ x ] WNL        LABS/DIAGNOSTIC TESTS                        9.9    9.7   )-----------( 178      ( 08 Aug 2018 07:37 )             28.7   08-08    143  |  107  |  70<H>  ----------------------------<  221<H>  4.5   |  27  |  2.93<H>    Ca    9.3      08 Aug 2018 07:37  Phos  4.1     08-08  Mg     2.7     08-08    TPro  6.5  /  Alb  2.9<L>  /  TBili  0.6  /  DBili  x   /  AST  12  /  ALT  14  /  AlkPhos  55  08-08      CULTURES:     None.      RADIOLOGY:    EXAM:  XR CHEST AP OR PA 1V                            PROCEDURE DATE:  08/07/2018          INTERPRETATION:  Cough.    AP chest. Prior earlier same day.  Low lung volumes. No change heart mediastinum. No change in left basilar   consolidation/atelectasis and small effusion. No pneumothorax or other   new abnormality. Right cardiac pacer reidentified in position.    Impression: Essentially stable exam.          EXAM:  CT CHEST                            PROCEDURE DATE:  08/05/2018          INTERPRETATION:  CT of the Chest without contrast    HISTORY: Shortness of breath with left infiltrate/effusion on chest x-ray    Technique: Multi-slice volumetric acquisition using 2.5 mm collimation.    Intravenous contrast was not administered.     INTERPRETATION: The lung windows demonstrate volume loss of the left lung   with mediastinal shift to the left. The left bronchus is occluded near   its origin likely due to retained secretions. The left lower lobe is   entirely consolidated. There is apparent normal aeration of the left   upper lobe indicating the occlusion of the left bronchus may have   occurred recently.      The mediastinum shows borderline heart size. Right cardiac pacemaker is   present. Prominent lymph nodes are seen in the left paratracheal space.      No pericardial nor pleural effusion is identified.      The trachea and proximal right bronchus show no focal lesions.    The bony structures show spurring along the thoracic spine.      Below the hemidiaphragms, left renal cysts are present and calcified   gallstones lie dependently in the gallbladder. The patient is status post   right nephrectomy. Bowel overlies lateral to the liver.    IMPRESSION: Occluded left bronchus with consolidation of the left lower   lobe and mediastinal shift to the left.          EXAM:  XR CHEST AP OR PA 1V                            PROCEDURE DATE:  08/05/2018          INTERPRETATION:  Chest one view    HISTORY: Chest pain    COMPARISON STUDY: 8/2/2018    Rotated expiratory view of the chest shows the heart to be probably   similar in size. The lungs show consolidation of the left lung base   indicating infiltrate or fluid or a combination of both and there is no   evidence of pneumothorax. Right cardiac pacemaker is again noted.    IMPRESSION:  Left base fluid/infiltrate.      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 Patient was admitted for pneumonia and was started on IV Zosyn, renally adjusted dose.  8/7/18 CXR suggested no change and patient is planned to have CT chest 8/8/18.    Problem/Recommendation - 1:  Problem: Pneumonia, bacterial.   Recommendation:   1- Continue IV Zosyn 3.375 gm IVPB q 12 hours.  2- Renal management.  3- O2 as needed.  4- Bronchodilators, and Pulmonary management.  5- F/u chest CT.  6- Blood and urine cultures.    Problem/Recommendation - 2:  ·  Problem: COPD exacerbation.    Recommendation:   1- Bronchodilators.  2- O2 as needed.  3- Pulmonary management, and follow up.  4- Steroids as per primary, and pulmonary team.     Problem/Recommendation - 3:  ·  Problem: Hypertension, and CHF.    Recommendation:   1- Monitor Blood pressure closely.  2- Blood pressure control.  3- BP. meds as per cardiology and primary care team.   4- Cardiology management and follow up.    Problem/Recommendation - 4:  ·  Problem: CKD (chronic kidney disease).    Recommendation:   1- ABX dose adjustment.  2- Fluid and electrolytes management.   3- Nephrology follow up.     Discussed with medical resident, and PCP.

## 2018-08-08 NOTE — PROGRESS NOTE ADULT - SUBJECTIVE AND OBJECTIVE BOX
Patient is a 85y old  Male who presents with a chief complaint of SOB (07 Aug 2018 12:40)      INTERVAL HPI/OVERNIGHT EVENTS:  feeling better, save for cough-non productive    VITAL SIGNS:  T(F): 97.8 (08-08-18 @ 09:12)  HR: 60 (08-08-18 @ 09:12)  BP: 144/47 (08-08-18 @ 09:12)  RR: 18 (08-08-18 @ 09:12)  SpO2: 98% (08-08-18 @ 09:12)  Wt(kg): --  I&O's Detail    07 Aug 2018 07:01  -  08 Aug 2018 07:00  --------------------------------------------------------  IN:    Oral Fluid: 240 mL  Total IN: 240 mL    OUT:  Total OUT: 0 mL    Total NET: 240 mL              REVIEW OF SYSTEMS:    CONSTITUTIONAL:  No fevers, chills, sweats    HEENT:  Eyes:  No diplopia or blurred vision. ENT:  No earache, sore throat or runny nose.    CARDIOVASCULAR:  No pressure, squeezing, tightness, or heaviness about the chest; no palpitations.    RESPIRATORY:  Per HPI    GASTROINTESTINAL:  No abdominal pain, nausea, vomiting or diarrhea.    GENITOURINARY:  No dysuria, frequency or urgency.    NEUROLOGIC:  No paresthesias, fasciculations, seizures or weakness.    PSYCHIATRIC:  No disorder of thought or mood.      PHYSICAL EXAM:    Constitutional:no complaints  ENMT:atnc  Neck:supple  Respiratory: improved bs  Cardiovascular:rr  Gastrointestinal:soft  Extremities:no edema  Vascular:intact  Neurological:non focal  Musculoskeletal:no edema, normal rom    MEDICATIONS  (STANDING):  acetylcysteine 20% Inhalation 3 milliLiter(s) Inhalation every 6 hours  ALBUTerol/ipratropium for Nebulization 3 milliLiter(s) Nebulizer every 6 hours  cloNIDine 0.3 milliGRAM(s) Oral three times a day  clopidogrel Tablet 75 milliGRAM(s) Oral daily  enoxaparin Injectable 30 milliGRAM(s) SubCutaneous daily  hydrALAZINE 25 milliGRAM(s) Oral every 8 hours  methylPREDNISolone sodium succinate Injectable 40 milliGRAM(s) IV Push every 8 hours  metoprolol succinate ER 50 milliGRAM(s) Oral daily  montelukast 10 milliGRAM(s) Oral daily  pantoprazole  Injectable 40 milliGRAM(s) IV Push daily  piperacillin/tazobactam IVPB. 3.375 Gram(s) IV Intermittent every 12 hours  simvastatin 10 milliGRAM(s) Oral at bedtime    MEDICATIONS  (PRN):  oxyCODONE    5 mG/acetaminophen 325 mG 1 Tablet(s) Oral every 6 hours PRN Severe Pain (7 - 10)      Allergies    aspirin (Anaphylaxis)        LABS:                        9.9    9.7   )-----------( 178      ( 08 Aug 2018 07:37 )             28.7     08-08    143  |  107  |  70<H>  ----------------------------<  221<H>  4.5   |  27  |  2.93<H>    Ca    9.3      08 Aug 2018 07:37  Phos  4.1     08-08  Mg     2.7     08-08    TPro  6.5  /  Alb  2.9<L>  /  TBili  0.6  /  DBili  x   /  AST  12  /  ALT  14  /  AlkPhos  55  08-08              CAPILLARY BLOOD GLUCOSE        pro-bnp [id number] 08-05 @ 13:00      RADIOLOGY & ADDITIONAL TESTS:    CXR:  EXAM:  XR CHEST AP OR PA 1V                            PROCEDURE DATE:  08/07/2018          INTERPRETATION:  Cough.    AP chest. Prior earlier same day.  Low lung volumes. No change heart mediastinum. No change in left basilar   consolidation/atelectasis and small effusion. No pneumothorax or other   new abnormality. Right cardiac pacer reidentified in position.    Impression: Essentially stable exam.        NST; NEG

## 2018-08-08 NOTE — PROGRESS NOTE ADULT - PROBLEM SELECTOR PLAN 5
-elevated pro-BNP ~7000  -s/p 1 dose lasix 40 IV  -hold on further Lasix as patient looks dehydrated on examination  -ECHO recently in July 2018 showed EF >55% with mild Pulmonary HTN 44mmhg.  -cardiology consulted Dr Louie  stress test -ve -elevated pro-BNP ~7000  -s/p 1 dose lasix 40 IV  -ECHO recently in July 2018 showed EF >55% with mild Pulmonary HTN 44mmhg.  -cardiology consulted Dr Louie  - stress test -ve

## 2018-08-08 NOTE — PROGRESS NOTE ADULT - SUBJECTIVE AND OBJECTIVE BOX
PGY1 Note discussed with Supervising Resident and Primary Attending.    Patient is a 85y old  Male who presents with a chief complaint of SOB (07 Aug 2018 12:40)      INTERVAL HPI/OVERNIGHT EVENTS :    MEDICATIONS  (STANDING):  acetylcysteine 20% Inhalation 3 milliLiter(s) Inhalation every 6 hours  ALBUTerol/ipratropium for Nebulization 3 milliLiter(s) Nebulizer every 6 hours  cloNIDine 0.3 milliGRAM(s) Oral three times a day  clopidogrel Tablet 75 milliGRAM(s) Oral daily  enoxaparin Injectable 30 milliGRAM(s) SubCutaneous daily  furosemide    Tablet 40 milliGRAM(s) Oral daily  hydrALAZINE 25 milliGRAM(s) Oral every 8 hours  methylPREDNISolone sodium succinate Injectable 40 milliGRAM(s) IV Push every 12 hours  metoprolol succinate ER 50 milliGRAM(s) Oral daily  montelukast 10 milliGRAM(s) Oral daily  pantoprazole  Injectable 40 milliGRAM(s) IV Push daily  piperacillin/tazobactam IVPB. 3.375 Gram(s) IV Intermittent every 12 hours  simvastatin 10 milliGRAM(s) Oral at bedtime    MEDICATIONS  (PRN):  oxyCODONE    5 mG/acetaminophen 325 mG 1 Tablet(s) Oral every 6 hours PRN Severe Pain (7 - 10)      Allergies    aspirin (Anaphylaxis)    Intolerances        REVIEW OF SYSTEMS :  * CONSTITUTIONAL      : No Fever, Weight loss, or Fatigue  * EYES                             : No eye pain , Visual disturbances or Discharge  * RESPIRATORY             : No Cough, Wheezing, Chills or Hemoptysis; No shortness of breath  * CARDIOVASCULAR     : No Chest pain, Palpitations, Dizziness, or Leg swelling  * GASTROINTESTINAL  : No Abdominal or Epigastric pain. No Nausea, Vomiting or Hematemesis; No Diarrhea or Constipation. No Melena or Hematochezia.  * GENITOURINARY        : No Dysuria , Frequency , Haematuria   * NEUROLOGICAL          : No Headaches, Memory loss, Loss of trength, Numbness, or Tremors  * MUSCULOSKELETAL   : No Joint pain  * PSYCHIATRY                 : No Depression or Anxiety   * HEME/LYMPH              : No Easy Bruising or Bleeding gums  * SKIN                               : No Itching, Burning, Rashes, or Lesions     Vital Signs Last 24 Hrs  T(C): 36.6 (08 Aug 2018 09:12), Max: 36.6 (08 Aug 2018 09:12)  T(F): 97.8 (08 Aug 2018 09:12), Max: 97.8 (08 Aug 2018 09:12)  HR: 60 (08 Aug 2018 09:12) (60 - 65)  BP: 144/47 (08 Aug 2018 09:12) (112/42 - 178/62)  BP(mean): --  RR: 18 (08 Aug 2018 09:12) (16 - 18)  SpO2: 98% (08 Aug 2018 09:12) (98% - 100%)    PHYSICAL EXAM :  * GENERAL                 : NAD, Well-groomed, Well-developed  * HEAD                       :  Atraumatic, Normocephalic  * EYES                         : EOMI, PERRLA, Conjunctiva and Sclera clear  * ENT                           : Moist Mucous Membranes  * NECK                         : Supple, No JVD, Normal Thyroid  * CHEST/LUNG           : Clear to Auscultation bilaterally; No Rales, Rhonchi, Wheezing or Rubs  * HEART                       : Regular Rate and Rhythm; No murmurs, Rubs or gallops  * ABDOMEN                : Soft, Non-tender, Non-distended; Bowel Sounds present  * NERVOUS SYSTEM  :  Alert & Oriented X3, Good Concentration; Motor Strength 5/5 B/L UL LL ; DTRs 2+ Intact and Symmetric  * EXTREMITIES            :  2+ Peripheral Pulses, No clubbing, cyanosis, or edema  * SKIN                           : No Rashes or Lesions    LABS:                          9.9    9.7   )-----------( 178      ( 08 Aug 2018 07:37 )             28.7     08-08    143  |  107  |  70<H>  ----------------------------<  221<H>  4.5   |  27  |  2.93<H>    Ca    9.3      08 Aug 2018 07:37  Phos  4.1     08-08  Mg     2.7     08-08    TPro  6.5  /  Alb  2.9<L>  /  TBili  0.6  /  DBili  x   /  AST  12  /  ALT  14  /  AlkPhos  55  08-08        CAPILLARY BLOOD GLUCOSE          RADIOLOGY & ADDITIONAL TESTS:   No radiological imaging was required    Imaging Personally Reviewed   :  [ ] YES  [ ] NO    Consultant(s) Notes Reviewed :  [ ] YES  [ ] NO PGY1 Note discussed with Supervising Resident and Primary Attending.    Patient is a 85y old  Male who presents with a chief complaint of SOB (07 Aug 2018 12:40)      INTERVAL HPI/OVERNIGHT EVENTS : pt now having coarse wheezes on left lower lobe     MEDICATIONS  (STANDING):  acetylcysteine 20% Inhalation 3 milliLiter(s) Inhalation every 6 hours  ALBUTerol/ipratropium for Nebulization 3 milliLiter(s) Nebulizer every 6 hours  cloNIDine 0.3 milliGRAM(s) Oral three times a day  clopidogrel Tablet 75 milliGRAM(s) Oral daily  enoxaparin Injectable 30 milliGRAM(s) SubCutaneous daily  furosemide    Tablet 40 milliGRAM(s) Oral daily  hydrALAZINE 25 milliGRAM(s) Oral every 8 hours  methylPREDNISolone sodium succinate Injectable 40 milliGRAM(s) IV Push every 12 hours  metoprolol succinate ER 50 milliGRAM(s) Oral daily  montelukast 10 milliGRAM(s) Oral daily  pantoprazole  Injectable 40 milliGRAM(s) IV Push daily  piperacillin/tazobactam IVPB. 3.375 Gram(s) IV Intermittent every 12 hours  simvastatin 10 milliGRAM(s) Oral at bedtime    MEDICATIONS  (PRN):  oxyCODONE    5 mG/acetaminophen 325 mG 1 Tablet(s) Oral every 6 hours PRN Severe Pain (7 - 10)      Allergies    aspirin (Anaphylaxis)    Intolerances        REVIEW OF SYSTEMS :  * CONSTITUTIONAL      : No Fever, Weight loss, or Fatigue  * EYES                             : No eye pain , Visual disturbances or Discharge  * RESPIRATORY             : No Cough, Wheezing, Chills or Hemoptysis; No shortness of breath  * CARDIOVASCULAR     : No Chest pain, Palpitations, Dizziness, or Leg swelling  * GASTROINTESTINAL  : No Abdominal or Epigastric pain. No Nausea, Vomiting or Hematemesis; No Diarrhea or Constipation. No Melena or Hematochezia.  * GENITOURINARY        : No Dysuria , Frequency , Haematuria   * NEUROLOGICAL          : No Headaches, Memory loss, Loss of trength, Numbness, or Tremors  * MUSCULOSKELETAL   : No Joint pain  * PSYCHIATRY                 : No Depression or Anxiety   * HEME/LYMPH              : No Easy Bruising or Bleeding gums  * SKIN                               : No Itching, Burning, Rashes, or Lesions     Vital Signs Last 24 Hrs  T(C): 36.6 (08 Aug 2018 09:12), Max: 36.6 (08 Aug 2018 09:12)  T(F): 97.8 (08 Aug 2018 09:12), Max: 97.8 (08 Aug 2018 09:12)  HR: 60 (08 Aug 2018 09:12) (60 - 65)  BP: 144/47 (08 Aug 2018 09:12) (112/42 - 178/62)  BP(mean): --  RR: 18 (08 Aug 2018 09:12) (16 - 18)  SpO2: 98% (08 Aug 2018 09:12) (98% - 100%)    PHYSICAL EXAM :  * GENERAL                 : NAD, Well-groomed, Well-developed  * HEAD                       :  Atraumatic, Normocephalic  * EYES                         : EOMI, PERRLA, Conjunctiva and Sclera clear  * ENT                           : Moist Mucous Membranes  * NECK                         : Supple, No JVD, Normal Thyroid  * CHEST/LUNG           : Clear to Auscultation bilaterally; No Rales, Rhonchi, Wheezing or Rubs  * HEART                       : Regular Rate and Rhythm; No murmurs, Rubs or gallops  * ABDOMEN                : Soft, Non-tender, Non-distended; Bowel Sounds present  * NERVOUS SYSTEM  :  Alert & Oriented X3, Good Concentration; Motor Strength 5/5 B/L UL LL ; DTRs 2+ Intact and Symmetric  * EXTREMITIES            :  2+ Peripheral Pulses, No clubbing, cyanosis, or edema  * SKIN                           : No Rashes or Lesions    LABS:                          9.9    9.7   )-----------( 178      ( 08 Aug 2018 07:37 )             28.7     08-08    143  |  107  |  70<H>  ----------------------------<  221<H>  4.5   |  27  |  2.93<H>    Ca    9.3      08 Aug 2018 07:37  Phos  4.1     08-08  Mg     2.7     08-08    TPro  6.5  /  Alb  2.9<L>  /  TBili  0.6  /  DBili  x   /  AST  12  /  ALT  14  /  AlkPhos  55  08-08        CAPILLARY BLOOD GLUCOSE          RADIOLOGY & ADDITIONAL TESTS:   No radiological imaging was required    Imaging Personally Reviewed   :  [ ] YES  [ ] NO    Consultant(s) Notes Reviewed :  [ ] YES  [ ] NO

## 2018-08-08 NOTE — PROGRESS NOTE ADULT - PROBLEM SELECTOR PLAN 4
-SOB and wheezing on examination, cough, sputum production  -c/w Solumedrol 40 mg iv Q8 hrs   -c/w Supplemental O2   -c/w Duoneb Q6 hrs

## 2018-08-08 NOTE — PROGRESS NOTE ADULT - ASSESSMENT
-improved sob-pn/bronchitis/tom mucus plugging-s/p recent bronch/laryngoscopy  -copd-never smoker  -recent admission with intubation  -neg nst  -hx; chf,pacer,RCC rt nephrectomy,ckd,htn,hld,pvd--lle stent,diverticulosis    plan; nebs-mucomyst/steroids-will taper dose/antibx-to cover HCAP(zosyn)          limited ct chest prior to discharge re tom plug

## 2018-08-09 VITALS
HEART RATE: 60 BPM | TEMPERATURE: 98 F | SYSTOLIC BLOOD PRESSURE: 113 MMHG | DIASTOLIC BLOOD PRESSURE: 43 MMHG | RESPIRATION RATE: 18 BRPM | OXYGEN SATURATION: 97 %

## 2018-08-09 LAB
ALBUMIN SERPL ELPH-MCNC: 2.8 G/DL — LOW (ref 3.5–5)
ALP SERPL-CCNC: 52 U/L — SIGNIFICANT CHANGE UP (ref 40–120)
ALT FLD-CCNC: 14 U/L DA — SIGNIFICANT CHANGE UP (ref 10–60)
ANION GAP SERPL CALC-SCNC: 9 MMOL/L — SIGNIFICANT CHANGE UP (ref 5–17)
AST SERPL-CCNC: 8 U/L — LOW (ref 10–40)
BASOPHILS # BLD AUTO: 0 K/UL — SIGNIFICANT CHANGE UP (ref 0–0.2)
BASOPHILS NFR BLD AUTO: 0.1 % — SIGNIFICANT CHANGE UP (ref 0–2)
BILIRUB SERPL-MCNC: 0.7 MG/DL — SIGNIFICANT CHANGE UP (ref 0.2–1.2)
BUN SERPL-MCNC: 69 MG/DL — HIGH (ref 7–18)
CALCIUM SERPL-MCNC: 9.1 MG/DL — SIGNIFICANT CHANGE UP (ref 8.4–10.5)
CHLORIDE SERPL-SCNC: 106 MMOL/L — SIGNIFICANT CHANGE UP (ref 96–108)
CO2 SERPL-SCNC: 25 MMOL/L — SIGNIFICANT CHANGE UP (ref 22–31)
CREAT SERPL-MCNC: 2.56 MG/DL — HIGH (ref 0.5–1.3)
EOSINOPHIL # BLD AUTO: 0 K/UL — SIGNIFICANT CHANGE UP (ref 0–0.5)
EOSINOPHIL NFR BLD AUTO: 0.1 % — SIGNIFICANT CHANGE UP (ref 0–6)
GLUCOSE SERPL-MCNC: 176 MG/DL — HIGH (ref 70–99)
HCT VFR BLD CALC: 28.4 % — LOW (ref 39–50)
HGB BLD-MCNC: 9.4 G/DL — LOW (ref 13–17)
LYMPHOCYTES # BLD AUTO: 0.7 K/UL — LOW (ref 1–3.3)
LYMPHOCYTES # BLD AUTO: 8.4 % — LOW (ref 13–44)
MAGNESIUM SERPL-MCNC: 2.5 MG/DL — SIGNIFICANT CHANGE UP (ref 1.6–2.6)
MCHC RBC-ENTMCNC: 29.5 PG — SIGNIFICANT CHANGE UP (ref 27–34)
MCHC RBC-ENTMCNC: 33 GM/DL — SIGNIFICANT CHANGE UP (ref 32–36)
MCV RBC AUTO: 89.4 FL — SIGNIFICANT CHANGE UP (ref 80–100)
MONOCYTES # BLD AUTO: 0.7 K/UL — SIGNIFICANT CHANGE UP (ref 0–0.9)
MONOCYTES NFR BLD AUTO: 8 % — SIGNIFICANT CHANGE UP (ref 2–14)
NEUTROPHILS # BLD AUTO: 7.4 K/UL — SIGNIFICANT CHANGE UP (ref 1.8–7.4)
NEUTROPHILS NFR BLD AUTO: 83.4 % — HIGH (ref 43–77)
PHOSPHATE SERPL-MCNC: 3.8 MG/DL — SIGNIFICANT CHANGE UP (ref 2.5–4.5)
PLATELET # BLD AUTO: 152 K/UL — SIGNIFICANT CHANGE UP (ref 150–400)
POTASSIUM SERPL-MCNC: 4.4 MMOL/L — SIGNIFICANT CHANGE UP (ref 3.5–5.3)
POTASSIUM SERPL-SCNC: 4.4 MMOL/L — SIGNIFICANT CHANGE UP (ref 3.5–5.3)
PROT SERPL-MCNC: 6.1 G/DL — SIGNIFICANT CHANGE UP (ref 6–8.3)
RBC # BLD: 3.18 M/UL — LOW (ref 4.2–5.8)
RBC # FLD: 12.8 % — SIGNIFICANT CHANGE UP (ref 10.3–14.5)
SODIUM SERPL-SCNC: 140 MMOL/L — SIGNIFICANT CHANGE UP (ref 135–145)
WBC # BLD: 8.8 K/UL — SIGNIFICANT CHANGE UP (ref 3.8–10.5)
WBC # FLD AUTO: 8.8 K/UL — SIGNIFICANT CHANGE UP (ref 3.8–10.5)

## 2018-08-09 PROCEDURE — 84484 ASSAY OF TROPONIN QUANT: CPT

## 2018-08-09 PROCEDURE — 94640 AIRWAY INHALATION TREATMENT: CPT

## 2018-08-09 PROCEDURE — 87040 BLOOD CULTURE FOR BACTERIA: CPT

## 2018-08-09 PROCEDURE — 83735 ASSAY OF MAGNESIUM: CPT

## 2018-08-09 PROCEDURE — 84100 ASSAY OF PHOSPHORUS: CPT

## 2018-08-09 PROCEDURE — 83880 ASSAY OF NATRIURETIC PEPTIDE: CPT

## 2018-08-09 PROCEDURE — 93005 ELECTROCARDIOGRAM TRACING: CPT

## 2018-08-09 PROCEDURE — 84145 PROCALCITONIN (PCT): CPT

## 2018-08-09 PROCEDURE — 93017 CV STRESS TEST TRACING ONLY: CPT

## 2018-08-09 PROCEDURE — 85730 THROMBOPLASTIN TIME PARTIAL: CPT

## 2018-08-09 PROCEDURE — 71250 CT THORAX DX C-: CPT | Mod: 26

## 2018-08-09 PROCEDURE — 71250 CT THORAX DX C-: CPT

## 2018-08-09 PROCEDURE — A9502: CPT

## 2018-08-09 PROCEDURE — 80053 COMPREHEN METABOLIC PANEL: CPT

## 2018-08-09 PROCEDURE — 82550 ASSAY OF CK (CPK): CPT

## 2018-08-09 PROCEDURE — 78452 HT MUSCLE IMAGE SPECT MULT: CPT

## 2018-08-09 PROCEDURE — 71045 X-RAY EXAM CHEST 1 VIEW: CPT

## 2018-08-09 PROCEDURE — 85610 PROTHROMBIN TIME: CPT

## 2018-08-09 PROCEDURE — 99285 EMERGENCY DEPT VISIT HI MDM: CPT | Mod: 25

## 2018-08-09 PROCEDURE — 85027 COMPLETE CBC AUTOMATED: CPT

## 2018-08-09 RX ORDER — FUROSEMIDE 40 MG
1 TABLET ORAL
Qty: 0 | Refills: 0 | COMMUNITY
Start: 2018-08-09

## 2018-08-09 RX ORDER — SIMVASTATIN 20 MG/1
1 TABLET, FILM COATED ORAL
Qty: 0 | Refills: 0 | COMMUNITY
Start: 2018-08-09

## 2018-08-09 RX ORDER — MONTELUKAST 4 MG/1
1 TABLET, CHEWABLE ORAL
Qty: 0 | Refills: 0 | COMMUNITY
Start: 2018-08-09

## 2018-08-09 RX ORDER — METOPROLOL TARTRATE 50 MG
1 TABLET ORAL
Qty: 0 | Refills: 0 | COMMUNITY
Start: 2018-08-09

## 2018-08-09 RX ORDER — CLOPIDOGREL BISULFATE 75 MG/1
1 TABLET, FILM COATED ORAL
Qty: 0 | Refills: 0 | COMMUNITY
Start: 2018-08-09

## 2018-08-09 RX ADMIN — Medication 0.3 MILLIGRAM(S): at 13:52

## 2018-08-09 RX ADMIN — Medication 0.3 MILLIGRAM(S): at 05:39

## 2018-08-09 RX ADMIN — PIPERACILLIN AND TAZOBACTAM 25 GRAM(S): 4; .5 INJECTION, POWDER, LYOPHILIZED, FOR SOLUTION INTRAVENOUS at 05:40

## 2018-08-09 RX ADMIN — PANTOPRAZOLE SODIUM 40 MILLIGRAM(S): 20 TABLET, DELAYED RELEASE ORAL at 14:16

## 2018-08-09 RX ADMIN — Medication 25 MILLIGRAM(S): at 05:39

## 2018-08-09 RX ADMIN — Medication 40 MILLIGRAM(S): at 05:39

## 2018-08-09 RX ADMIN — CLOPIDOGREL BISULFATE 75 MILLIGRAM(S): 75 TABLET, FILM COATED ORAL at 13:52

## 2018-08-09 RX ADMIN — Medication 25 MILLIGRAM(S): at 13:52

## 2018-08-09 RX ADMIN — Medication 3 MILLILITER(S): at 08:43

## 2018-08-09 RX ADMIN — ENOXAPARIN SODIUM 30 MILLIGRAM(S): 100 INJECTION SUBCUTANEOUS at 13:52

## 2018-08-09 RX ADMIN — Medication 3 MILLILITER(S): at 03:42

## 2018-08-09 RX ADMIN — Medication 50 MILLIGRAM(S): at 05:39

## 2018-08-09 RX ADMIN — Medication 3 MILLILITER(S): at 03:43

## 2018-08-09 RX ADMIN — Medication 3 MILLILITER(S): at 08:44

## 2018-08-09 NOTE — PROGRESS NOTE ADULT - SUBJECTIVE AND OBJECTIVE BOX
Patient is a 85y old  Male who presents with a chief complaint of SOB (07 Aug 2018 12:40)      INTERVAL HPI/OVERNIGHT EVENTS:  coughed up plug yesterday  feela better, residual cough    VITAL SIGNS:  T(F): 97.6 (08-09-18 @ 07:15)  HR: 59 (08-09-18 @ 07:15)  BP: 149/62 (08-09-18 @ 07:15)  RR: 18 (08-09-18 @ 07:15)  SpO2: 98% (08-09-18 @ 07:15)  Wt(kg): --  I&O's Detail    08 Aug 2018 07:01  -  09 Aug 2018 07:00  --------------------------------------------------------  IN:    IV PiggyBack: 50 mL    Oral Fluid: 250 mL  Total IN: 300 mL    OUT:    Voided: 550 mL  Total OUT: 550 mL    Total NET: -250 mL              REVIEW OF SYSTEMS:    CONSTITUTIONAL:  No fevers, chills, sweats    HEENT:  Eyes:  No diplopia or blurred vision. ENT:  No earache, sore throat or runny nose.    CARDIOVASCULAR:  No pressure, squeezing, tightness, or heaviness about the chest; no palpitations.    RESPIRATORY:  Per HPI    GASTROINTESTINAL:  No abdominal pain, nausea, vomiting or diarrhea.    GENITOURINARY:  No dysuria, frequency or urgency.    NEUROLOGIC:  No paresthesias, fasciculations, seizures or weakness.    PSYCHIATRIC:  No disorder of thought or mood.      PHYSICAL EXAM:    Constitutional:no complaints  ENMT:atnc  Neck:supple  Respiratory:clear  Cardiovascular:rr  Gastrointestinal:soft  Extremities:no edema  Vascular:intact  Neurological:non focal  Musculoskeletal:no edema, normal rom    MEDICATIONS  (STANDING):  acetylcysteine 20% Inhalation 3 milliLiter(s) Inhalation every 6 hours  ALBUTerol/ipratropium for Nebulization 3 milliLiter(s) Nebulizer every 6 hours  cloNIDine 0.3 milliGRAM(s) Oral three times a day  clopidogrel Tablet 75 milliGRAM(s) Oral daily  enoxaparin Injectable 30 milliGRAM(s) SubCutaneous daily  furosemide    Tablet 40 milliGRAM(s) Oral daily  hydrALAZINE 25 milliGRAM(s) Oral every 8 hours  methylPREDNISolone sodium succinate Injectable 40 milliGRAM(s) IV Push every 12 hours  metoprolol succinate ER 50 milliGRAM(s) Oral daily  montelukast 10 milliGRAM(s) Oral at bedtime  pantoprazole  Injectable 40 milliGRAM(s) IV Push daily  piperacillin/tazobactam IVPB. 3.375 Gram(s) IV Intermittent every 12 hours  simvastatin 10 milliGRAM(s) Oral at bedtime    MEDICATIONS  (PRN):  oxyCODONE    5 mG/acetaminophen 325 mG 1 Tablet(s) Oral every 6 hours PRN Severe Pain (7 - 10)      Allergies    aspirin (Anaphylaxis)            LABS:                        9.4    8.8   )-----------( 152      ( 09 Aug 2018 06:58 )             28.4     08-09    140  |  106  |  69<H>  ----------------------------<  176<H>  4.4   |  25  |  2.56<H>    Ca    9.1      09 Aug 2018 06:58  Phos  3.8     08-09  Mg     2.5     08-09    TPro  6.1  /  Alb  2.8<L>  /  TBili  0.7  /  DBili  x   /  AST  8<L>  /  ALT  14  /  AlkPhos  52  08-09

## 2018-08-09 NOTE — PROGRESS NOTE ADULT - ASSESSMENT
-improved sob-pn/bronchitis/tom mucus plugging-s/p recent bronch/laryngoscopy  -copd-never smoker  -recent admission with intubation  -neg nst  -hx; chf,pacer,RCC rt nephrectomy,ckd,htn,hld,pvd--lle stent,diverticulosis    plan; nebs-mucomyst/steroids-will transition to po/antibx-to cover HCAP(zosyn)          limited ct chest  re tom plug

## 2018-08-09 NOTE — PROGRESS NOTE ADULT - SUBJECTIVE AND OBJECTIVE BOX
Patient is a 85y old  Male who presents with a chief complaint of SOB (07 Aug 2018 12:40)    pt seen in tele [x  ], reg med floor [   ], bed [x  ], chair at bedside [   ], a+o x3 [x  ], lethargic [  ],  nad [ x ]    Allergies    aspirin (Anaphylaxis)        Vitals    T(F): 97.6 (08-09-18 @ 07:15), Max: 98.2 (08-08-18 @ 15:37)  HR: 59 (08-09-18 @ 07:15) (59 - 61)  BP: 149/62 (08-09-18 @ 07:15) (137/63 - 174/62)  RR: 18 (08-09-18 @ 07:15) (18 - 20)  SpO2: 98% (08-09-18 @ 07:15) (98% - 100%)  Wt(kg): --  CAPILLARY BLOOD GLUCOSE          Labs                          9.4    8.8   )-----------( 152      ( 09 Aug 2018 06:58 )             28.4       08-09    140  |  106  |  69<H>  ----------------------------<  176<H>  4.4   |  25  |  2.56<H>    Ca    9.1      09 Aug 2018 06:58  Phos  3.8     08-09  Mg     2.5     08-09    TPro  6.1  /  Alb  2.8<L>  /  TBili  0.7  /  DBili  x   /  AST  8<L>  /  ALT  14  /  AlkPhos  52  08-09            .Blood Blood  08-08 @ 00:42   No growth to date.  --  --          Radiology Results      Meds    MEDICATIONS  (STANDING):  acetylcysteine 20% Inhalation 3 milliLiter(s) Inhalation every 6 hours  ALBUTerol/ipratropium for Nebulization 3 milliLiter(s) Nebulizer every 6 hours  cloNIDine 0.3 milliGRAM(s) Oral three times a day  clopidogrel Tablet 75 milliGRAM(s) Oral daily  enoxaparin Injectable 30 milliGRAM(s) SubCutaneous daily  furosemide    Tablet 40 milliGRAM(s) Oral daily  hydrALAZINE 25 milliGRAM(s) Oral every 8 hours  methylPREDNISolone sodium succinate Injectable 40 milliGRAM(s) IV Push every 12 hours  metoprolol succinate ER 50 milliGRAM(s) Oral daily  montelukast 10 milliGRAM(s) Oral at bedtime  pantoprazole  Injectable 40 milliGRAM(s) IV Push daily  piperacillin/tazobactam IVPB. 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  Pleural effusion, not elsewhere classified  h/o Stented coronary artery  Pacemaker  CKD (chronic kidney disease)  Hypertension  AICD (automatic cardioverter/defibrillator) present  CAD (coronary artery disease)  Kidney carcinoma  Prostate cancer  Kidney carcinoma  Hernia  Prostate      PLAN    cont zosyn  id f/u  f/u blood cx  pulm f/u noted  cont bronchodilators  cont mucomyst nebs q6  cont supplement O2  cardio f/u   resume lasix   cont steroids  ST results neg noted above  ct chest without contrast in am   incentive spirometer  cont current meds Patient is a 85y old  Male who presents with a chief complaint of SOB (07 Aug 2018 12:40)    pt seen in tele [x  ], reg med floor [   ], bed [x  ], chair at bedside [   ], a+o x3 [x  ], lethargic [  ],  nad [ x ]    Allergies    aspirin (Anaphylaxis)        Vitals    T(F): 97.6 (08-09-18 @ 07:15), Max: 98.2 (08-08-18 @ 15:37)  HR: 59 (08-09-18 @ 07:15) (59 - 61)  BP: 149/62 (08-09-18 @ 07:15) (137/63 - 174/62)  RR: 18 (08-09-18 @ 07:15) (18 - 20)  SpO2: 98% (08-09-18 @ 07:15) (98% - 100%)  Wt(kg): --  CAPILLARY BLOOD GLUCOSE          Labs                          9.4    8.8   )-----------( 152      ( 09 Aug 2018 06:58 )             28.4       08-09    140  |  106  |  69<H>  ----------------------------<  176<H>  4.4   |  25  |  2.56<H>    Ca    9.1      09 Aug 2018 06:58  Phos  3.8     08-09  Mg     2.5     08-09    TPro  6.1  /  Alb  2.8<L>  /  TBili  0.7  /  DBili  x   /  AST  8<L>  /  ALT  14  /  AlkPhos  52  08-09            .Blood Blood  08-08 @ 00:42   No growth to date.  --  --          Radiology Results      Meds    MEDICATIONS  (STANDING):  acetylcysteine 20% Inhalation 3 milliLiter(s) Inhalation every 6 hours  ALBUTerol/ipratropium for Nebulization 3 milliLiter(s) Nebulizer every 6 hours  cloNIDine 0.3 milliGRAM(s) Oral three times a day  clopidogrel Tablet 75 milliGRAM(s) Oral daily  enoxaparin Injectable 30 milliGRAM(s) SubCutaneous daily  furosemide    Tablet 40 milliGRAM(s) Oral daily  hydrALAZINE 25 milliGRAM(s) Oral every 8 hours  methylPREDNISolone sodium succinate Injectable 40 milliGRAM(s) IV Push every 12 hours  metoprolol succinate ER 50 milliGRAM(s) Oral daily  montelukast 10 milliGRAM(s) Oral at bedtime  pantoprazole  Injectable 40 milliGRAM(s) IV Push daily  piperacillin/tazobactam IVPB. 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  id f/u  blood cx neg noted above  pulm f/u   cont bronchodilators  cont mucomyst nebs q6  cont supplement O2  cardio f/u   cont lasix   taper steroids  ST results neg noted above  f/u ct chest without contrast   incentive spirometer  cont current meds  d/c plan pending results of rept ct chest

## 2018-08-13 LAB
CULTURE RESULTS: SIGNIFICANT CHANGE UP
CULTURE RESULTS: SIGNIFICANT CHANGE UP
SPECIMEN SOURCE: SIGNIFICANT CHANGE UP
SPECIMEN SOURCE: SIGNIFICANT CHANGE UP

## 2019-09-07 ENCOUNTER — EMERGENCY (EMERGENCY)
Facility: HOSPITAL | Age: 84
LOS: 1 days | Discharge: HOME | End: 2019-09-07
Attending: EMERGENCY MEDICINE
Payer: MEDICARE

## 2019-09-07 VITALS
SYSTOLIC BLOOD PRESSURE: 146 MMHG | OXYGEN SATURATION: 98 % | HEART RATE: 64 BPM | DIASTOLIC BLOOD PRESSURE: 90 MMHG | TEMPERATURE: 98 F | RESPIRATION RATE: 18 BRPM

## 2019-09-07 VITALS
OXYGEN SATURATION: 98 % | DIASTOLIC BLOOD PRESSURE: 71 MMHG | WEIGHT: 149.91 LBS | RESPIRATION RATE: 20 BRPM | SYSTOLIC BLOOD PRESSURE: 187 MMHG | HEART RATE: 58 BPM | TEMPERATURE: 97 F | HEIGHT: 64 IN

## 2019-09-07 LAB
ALBUMIN SERPL ELPH-MCNC: 4 G/DL — SIGNIFICANT CHANGE UP (ref 3.5–5)
ALP SERPL-CCNC: 84 U/L — SIGNIFICANT CHANGE UP (ref 40–120)
ALT FLD-CCNC: 18 U/L DA — SIGNIFICANT CHANGE UP (ref 10–60)
ANION GAP SERPL CALC-SCNC: 4 MMOL/L — LOW (ref 5–17)
APPEARANCE UR: CLEAR — SIGNIFICANT CHANGE UP
APTT BLD: 34.2 SEC — SIGNIFICANT CHANGE UP (ref 27.5–36.3)
AST SERPL-CCNC: 14 U/L — SIGNIFICANT CHANGE UP (ref 10–40)
BACTERIA # UR AUTO: ABNORMAL /HPF
BASOPHILS # BLD AUTO: 0.06 K/UL — SIGNIFICANT CHANGE UP (ref 0–0.2)
BASOPHILS NFR BLD AUTO: 0.8 % — SIGNIFICANT CHANGE UP (ref 0–2)
BILIRUB SERPL-MCNC: 1.8 MG/DL — HIGH (ref 0.2–1.2)
BILIRUB UR-MCNC: NEGATIVE — SIGNIFICANT CHANGE UP
BUN SERPL-MCNC: 72 MG/DL — HIGH (ref 7–18)
CALCIUM SERPL-MCNC: 9.7 MG/DL — SIGNIFICANT CHANGE UP (ref 8.4–10.5)
CHLORIDE SERPL-SCNC: 108 MMOL/L — SIGNIFICANT CHANGE UP (ref 96–108)
CK SERPL-CCNC: 22 U/L — LOW (ref 35–232)
CO2 SERPL-SCNC: 31 MMOL/L — SIGNIFICANT CHANGE UP (ref 22–31)
COLOR SPEC: YELLOW — SIGNIFICANT CHANGE UP
CREAT SERPL-MCNC: 2.44 MG/DL — HIGH (ref 0.5–1.3)
DIFF PNL FLD: NEGATIVE — SIGNIFICANT CHANGE UP
EOSINOPHIL # BLD AUTO: 0.33 K/UL — SIGNIFICANT CHANGE UP (ref 0–0.5)
EOSINOPHIL NFR BLD AUTO: 4.6 % — SIGNIFICANT CHANGE UP (ref 0–6)
EPI CELLS # UR: ABNORMAL /HPF
GLUCOSE SERPL-MCNC: 120 MG/DL — HIGH (ref 70–99)
GLUCOSE UR QL: NEGATIVE — SIGNIFICANT CHANGE UP
HCT VFR BLD CALC: 41.8 % — SIGNIFICANT CHANGE UP (ref 39–50)
HGB BLD-MCNC: 13.7 G/DL — SIGNIFICANT CHANGE UP (ref 13–17)
IMM GRANULOCYTES NFR BLD AUTO: 0.1 % — SIGNIFICANT CHANGE UP (ref 0–1.5)
INR BLD: 1.06 RATIO — SIGNIFICANT CHANGE UP (ref 0.88–1.16)
KETONES UR-MCNC: NEGATIVE — SIGNIFICANT CHANGE UP
LEUKOCYTE ESTERASE UR-ACNC: NEGATIVE — SIGNIFICANT CHANGE UP
LIDOCAIN IGE QN: 200 U/L — SIGNIFICANT CHANGE UP (ref 73–393)
LYMPHOCYTES # BLD AUTO: 1.29 K/UL — SIGNIFICANT CHANGE UP (ref 1–3.3)
LYMPHOCYTES # BLD AUTO: 17.8 % — SIGNIFICANT CHANGE UP (ref 13–44)
MCHC RBC-ENTMCNC: 31.6 PG — SIGNIFICANT CHANGE UP (ref 27–34)
MCHC RBC-ENTMCNC: 32.8 GM/DL — SIGNIFICANT CHANGE UP (ref 32–36)
MCV RBC AUTO: 96.3 FL — SIGNIFICANT CHANGE UP (ref 80–100)
MONOCYTES # BLD AUTO: 0.78 K/UL — SIGNIFICANT CHANGE UP (ref 0–0.9)
MONOCYTES NFR BLD AUTO: 10.8 % — SIGNIFICANT CHANGE UP (ref 2–14)
NEUTROPHILS # BLD AUTO: 4.78 K/UL — SIGNIFICANT CHANGE UP (ref 1.8–7.4)
NEUTROPHILS NFR BLD AUTO: 65.9 % — SIGNIFICANT CHANGE UP (ref 43–77)
NITRITE UR-MCNC: NEGATIVE — SIGNIFICANT CHANGE UP
NRBC # BLD: 0 /100 WBCS — SIGNIFICANT CHANGE UP (ref 0–0)
PH UR: 5 — SIGNIFICANT CHANGE UP (ref 5–8)
PLATELET # BLD AUTO: 206 K/UL — SIGNIFICANT CHANGE UP (ref 150–400)
POTASSIUM SERPL-MCNC: 4 MMOL/L — SIGNIFICANT CHANGE UP (ref 3.5–5.3)
POTASSIUM SERPL-SCNC: 4 MMOL/L — SIGNIFICANT CHANGE UP (ref 3.5–5.3)
PROT SERPL-MCNC: 7.3 G/DL — SIGNIFICANT CHANGE UP (ref 6–8.3)
PROT UR-MCNC: 100
PROTHROM AB SERPL-ACNC: 11.8 SEC — SIGNIFICANT CHANGE UP (ref 10–12.9)
RBC # BLD: 4.34 M/UL — SIGNIFICANT CHANGE UP (ref 4.2–5.8)
RBC # FLD: 14.9 % — HIGH (ref 10.3–14.5)
RBC CASTS # UR COMP ASSIST: SIGNIFICANT CHANGE UP /HPF (ref 0–2)
SODIUM SERPL-SCNC: 143 MMOL/L — SIGNIFICANT CHANGE UP (ref 135–145)
SP GR SPEC: 1.01 — SIGNIFICANT CHANGE UP (ref 1.01–1.02)
UROBILINOGEN FLD QL: NEGATIVE — SIGNIFICANT CHANGE UP
WBC # BLD: 7.25 K/UL — SIGNIFICANT CHANGE UP (ref 3.8–10.5)
WBC # FLD AUTO: 7.25 K/UL — SIGNIFICANT CHANGE UP (ref 3.8–10.5)
WBC UR QL: SIGNIFICANT CHANGE UP /HPF (ref 0–5)

## 2019-09-07 PROCEDURE — 85610 PROTHROMBIN TIME: CPT

## 2019-09-07 PROCEDURE — 99284 EMERGENCY DEPT VISIT MOD MDM: CPT | Mod: 25

## 2019-09-07 PROCEDURE — 74176 CT ABD & PELVIS W/O CONTRAST: CPT | Mod: 26

## 2019-09-07 PROCEDURE — 81001 URINALYSIS AUTO W/SCOPE: CPT

## 2019-09-07 PROCEDURE — 74176 CT ABD & PELVIS W/O CONTRAST: CPT

## 2019-09-07 PROCEDURE — 71250 CT THORAX DX C-: CPT

## 2019-09-07 PROCEDURE — 71250 CT THORAX DX C-: CPT | Mod: 26

## 2019-09-07 PROCEDURE — 85027 COMPLETE CBC AUTOMATED: CPT

## 2019-09-07 PROCEDURE — 36415 COLL VENOUS BLD VENIPUNCTURE: CPT

## 2019-09-07 PROCEDURE — 80053 COMPREHEN METABOLIC PANEL: CPT

## 2019-09-07 PROCEDURE — 87086 URINE CULTURE/COLONY COUNT: CPT

## 2019-09-07 PROCEDURE — 85730 THROMBOPLASTIN TIME PARTIAL: CPT

## 2019-09-07 PROCEDURE — 82550 ASSAY OF CK (CPK): CPT

## 2019-09-07 PROCEDURE — 99285 EMERGENCY DEPT VISIT HI MDM: CPT

## 2019-09-07 PROCEDURE — 83690 ASSAY OF LIPASE: CPT

## 2019-09-07 PROCEDURE — 96374 THER/PROPH/DIAG INJ IV PUSH: CPT

## 2019-09-07 RX ORDER — ACETAMINOPHEN 500 MG
1000 TABLET ORAL ONCE
Refills: 0 | Status: COMPLETED | OUTPATIENT
Start: 2019-09-07 | End: 2019-09-07

## 2019-09-07 RX ADMIN — Medication 400 MILLIGRAM(S): at 19:31

## 2019-09-07 NOTE — ED PROVIDER NOTE - OBJECTIVE STATEMENT
87 y/o M with a significant PMHx of AICD, CAD, CKD, HTN, kidney carcinoma s/p R nephrectomy, prostate cancer and a significant PSHx of hernia hernioplasty and prostatectomy presents to the ED s/p fall from chair. Patient states he was bending over to pick something up when he lost balance and fell on his L side. Patient is now complaining of left lower rib pain, rated at 6/10 at this time. Patient states he occasionally gets pain while breathing. Patient went to urgent care today where he had an X-ray done showing a 10th rib fracture; was referred here for further evaluation. Patient reports taking Tylenol with minimal relief to pain; last dose 10:00 AM today. Denies shortness of breath, nausea, vomiting or any other acute complaints. Patient currently on Plavix.

## 2019-09-07 NOTE — ED PROVIDER NOTE - CARE PLAN
Principal Discharge DX:	Rib contusion, left, initial encounter  Secondary Diagnosis:	Abdominal pain Principal Discharge DX:	Rib contusion, left, initial encounter  Secondary Diagnosis:	Abdominal pain  Secondary Diagnosis:	Lung nodules

## 2019-09-07 NOTE — ED PROVIDER NOTE - CLINICAL SUMMARY MEDICAL DECISION MAKING FREE TEXT BOX
s/p fall, seen @ [facility], told rib fracture with pl. eff., pt also c/o Lt upper abd pain, will get CT to r/o trauma

## 2019-09-07 NOTE — ED PROVIDER NOTE - PATIENT PORTAL LINK FT
You can access the FollowMyHealth Patient Portal offered by [facility] by registering at the following website: [email or web address]. By joining [facility]’s FollowMyHealth portal, you will also be able to view your health information using other applications (apps) compatible with our system.

## 2019-09-07 NOTE — ED PROVIDER NOTE - PROGRESS NOTE DETAILS
pt with no rib fracture, pt with lung nodule which he knows & has been f/u with Pul., pt also knows he has renal insuff., No acute abd pathology, will d/c home with daughter

## 2019-09-07 NOTE — ED ADULT NURSE NOTE - OBJECTIVE STATEMENT
pt awake alert oriented x3 not in acuted distress,sent from urgent care for s/p fall with left rib fracture, pt denies shortness of breath,

## 2019-09-08 LAB
CULTURE RESULTS: SIGNIFICANT CHANGE UP
SPECIMEN SOURCE: SIGNIFICANT CHANGE UP

## 2019-09-08 NOTE — PHYSICAL THERAPY INITIAL EVALUATION ADULT - REHAB POTENTIAL, PT EVAL
good, to achieve stated therapy goals
fair, will monitor progress closely
patient remaisn neuro intact during ed stay. no signs of ich. instructed to f.u pmd. return precaution instructed

## 2019-11-14 NOTE — ED PROVIDER NOTE - CROS ED ROS STATEMENT
[facility]    Brief Operative Note    Pre-operative diagnosis: Cholecystitis [K81.9]  Post-operative diagnosis Same as pre-operative diagnosis    Procedure: Procedure(s):  CHOLECYSTECTOMY, LAPAROSCOPIC  OPEN LEFT INGUINAL HERNIA REPAIR  Surgeon: Surgeon(s) and Role:     * Ponce Hernandez MD - Primary     * Derrick Abreu MD - Resident - Assisting  Anesthesia: General   Estimated blood loss: Less than 50 ml  Drains: None  Specimens:   ID Type Source Tests Collected by Time Destination   A : Gallbladder & contents Tissue Gallbladder and Contents SURGICAL PATHOLOGY EXAM Ponce Hernandez MD 11/14/2019  1:01 PM      Findings:   adhesions and thick gallbladder. dense intraabdominal adhesions. open left inguinal hernia repair with progrip mesh. unable to dissect preperitoneal space during laparoscopic attempt of hernia repair due scar tissue..  Complications: None.  Implants:   Implant Name Type Inv. Item Serial No.  Lot No. LRB No. Used   MESH PROGRIP 14X9CM PARIETEX LEFT [id number] Mesh MESH PROGRIP 14X9CM PARIETEX LEFT [id number]  [facility] [id number] Left 1       Derrick Abreu MD       all other ROS negative except as per HPI

## 2020-01-02 NOTE — ED PROVIDER NOTE - BREATH SOUNDS
normal Opioid Counseling: I discussed with the patient the potential side effects of opioids including but not limited to addiction, altered mental status, and depression. I stressed avoiding alcohol, benzodiazepines, muscle relaxants and sleep aids unless specifically okayed by a physician. The patient verbalized understanding of the proper use and possible adverse effects of opioids. All of the patient's questions and concerns were addressed. They were instructed to flush the remaining pills down the toilet if they did not need them for pain.

## 2020-05-15 NOTE — DIETITIAN INITIAL EVALUATION ADULT. - ADHERENCE
low salt, low sugar (not DM) [Initial] : an initial visit [Shortness of Breath] : shortness of breath

## 2021-01-14 NOTE — DIETITIAN INITIAL EVALUATION ADULT. - PATIENT PROFILE REVIEWED
See your Primary Doctor this next week for follow up -- call to discuss.    Stay well hydrated, eat regular healthy meals, get plenty of rest.    See SYNCOPE information and return instructions given to you.    Seek immediate medical care for new/worsening symptoms/concerns. yes

## 2022-12-21 NOTE — PROGRESS NOTE ADULT - PROBLEM SELECTOR PLAN 2
-c/w Clonidine ,hydralazine and metoprolol  -monitor Bp.  -DASH diet
Speaking Coherently

## 2024-02-15 NOTE — DISCHARGE NOTE ADULT - NS AS DC FOLLOWUP STROKE INST
Heart Failure/Pneumonia/Smoking Cessation   02-14-24 @ 07:01  -  02-15-24 @ 07:00  --------------------------------------------------------  OUT:    Colostomy (mL): 1350 mL  Total OUT: 1350 mL    Total NET: -1350 mL      02-15-24 @ 07:01  -  02-15-24 @ 13:45  --------------------------------------------------------  OUT:    Colostomy (mL): 375 mL  Total OUT: 375 mL    Total NET: -375 mL

## 2024-06-13 NOTE — ED ADULT NURSE REASSESSMENT NOTE - NS ED NURSE REASSESS COMMENT FT1
pt.remained  stable.denies  pain on tele box G  with  nsr. am LABS. done.ENDROSED to [facility].pt.not in distress 125

## 2024-11-05 NOTE — ED ADULT NURSE REASSESSMENT NOTE - NS ED NURSE REASSESS COMMENT FT1
- stay well hydrated.  - F/U BMP NEXT WEEK   Orders:    Referral to Palliative Care; Future     Pt. is aox3 medication given with apple sauce iv patent in hospital bed no distress noted will continue to monitor

## 2024-11-06 NOTE — PATIENT PROFILE ADULT. - PRO ARRIVE FROM
"Spoke to Mr Kodak. Took mag citrate Monday eve and had frequent loose to watery stool through the night and into Tuesday. Denies nausea, vomiting, abdominal pain. He has not restarted Miralax. Eating fair and drinking plenty of fluids. \"Urinating a lot.\" RTC tomorrow for follow up as scheduled.  "
home

## 2025-04-28 NOTE — ED PROVIDER NOTE - CROS ED ROS STATEMENT
Winlevi Pregnancy And Lactation Text: This medication is considered safe during pregnancy and breastfeeding.
Tazorac Counseling:  Patient advised that medication is irritating and drying.  Patient may need to apply sparingly and wash off after an hour before eventually leaving it on overnight.  The patient verbalized understanding of the proper use and possible adverse effects of tazorac.  All of the patient's questions and concerns were addressed.
Minocycline Counseling: Patient advised regarding possible photosensitivity and discoloration of the teeth, skin, lips, tongue and gums.  Patient instructed to avoid sunlight, if possible.  When exposed to sunlight, patients should wear protective clothing, sunglasses, and sunscreen.  The patient was instructed to call the office immediately if the following severe adverse effects occur:  hearing changes, easy bruising/bleeding, severe headache, or vision changes.  The patient verbalized understanding of the proper use and possible adverse effects of minocycline.  All of the patient's questions and concerns were addressed.
Tetracycline Pregnancy And Lactation Text: This medication is Pregnancy Category D and not consider safe during pregnancy. It is also excreted in breast milk.
Topical Retinoid counseling:  Patient advised to apply a pea-sized amount only at bedtime and wait 30 minutes after washing their face before applying.  If too drying, patient may add a non-comedogenic moisturizer. The patient verbalized understanding of the proper use and possible adverse effects of retinoids.  All of the patient's questions and concerns were addressed.
Topical Sulfur Applications Pregnancy And Lactation Text: This medication is Pregnancy Category C and has an unknown safety profile during pregnancy. It is unknown if this topical medication is excreted in breast milk.
Azithromycin Counseling:  I discussed with the patient the risks of azithromycin including but not limited to GI upset, allergic reaction, drug rash, diarrhea, and yeast infections.
High Dose Vitamin A Counseling: Side effects reviewed, pt to contact office should one occur.
Dapsone Pregnancy And Lactation Text: This medication is Pregnancy Category C and is not considered safe during pregnancy or breast feeding.
Spironolactone Pregnancy And Lactation Text: This medication can cause feminization of the male fetus and should be avoided during pregnancy. The active metabolite is also found in breast milk.
Benzoyl Peroxide Counseling: Patient counseled that medicine may cause skin irritation and bleach clothing.  In the event of skin irritation, the patient was advised to reduce the amount of the drug applied or use it less frequently.   The patient verbalized understanding of the proper use and possible adverse effects of benzoyl peroxide.  All of the patient's questions and concerns were addressed.
Topical Clindamycin Pregnancy And Lactation Text: This medication is Pregnancy Category B and is considered safe during pregnancy. It is unknown if it is excreted in breast milk.
Birth Control Pills Pregnancy And Lactation Text: This medication should be avoided if pregnant and for the first 30 days post-partum.
Isotretinoin Counseling: Patient should get monthly blood tests, not donate blood, not drive at night if vision affected, not share medication, and not undergo elective surgery for 6 months after tx completed. Side effects reviewed, pt to contact office should one occur.
Bactrim Pregnancy And Lactation Text: This medication is Pregnancy Category D and is known to cause fetal risk.  It is also excreted in breast milk.
Tazorac Pregnancy And Lactation Text: This medication is not safe during pregnancy. It is unknown if this medication is excreted in breast milk.
Azelaic Acid Counseling: Patient counseled that medicine may cause skin irritation and to avoid applying near the eyes.  In the event of skin irritation, the patient was advised to reduce the amount of the drug applied or use it less frequently.   The patient verbalized understanding of the proper use and possible adverse effects of azelaic acid.  All of the patient's questions and concerns were addressed.
Include Pregnancy/Lactation Warning?: Add Automatically Based on Childbearing Potential and Patient Age
Detail Level: Zone
Erythromycin Counseling:  I discussed with the patient the risks of erythromycin including but not limited to GI upset, allergic reaction, drug rash, diarrhea, increase in liver enzymes, and yeast infections.
Aklief counseling:  Patient advised to apply a pea-sized amount only at bedtime and wait 30 minutes after washing their face before applying.  If too drying, patient may add a non-comedogenic moisturizer.  The most commonly reported side effects including irritation, redness, scaling, dryness, stinging, burning, itching, and increased risk of sunburn.  The patient verbalized understanding of the proper use and possible adverse effects of retinoids.  All of the patient's questions and concerns were addressed.
Azithromycin Pregnancy And Lactation Text: This medication is considered safe during pregnancy and is also secreted in breast milk.
Doxycycline Counseling:  Patient counseled regarding possible photosensitivity and increased risk for sunburn.  Patient instructed to avoid sunlight, if possible.  When exposed to sunlight, patients should wear protective clothing, sunglasses, and sunscreen.  The patient was instructed to call the office immediately if the following severe adverse effects occur:  hearing changes, easy bruising/bleeding, severe headache, or vision changes.  The patient verbalized understanding of the proper use and possible adverse effects of doxycycline.  All of the patient's questions and concerns were addressed.
Topical Retinoid Pregnancy And Lactation Text: This medication is Pregnancy Category C. It is unknown if this medication is excreted in breast milk.
Winlevi Counseling:  I discussed with the patient the risks of topical clascoterone including but not limited to erythema, scaling, itching, and stinging. Patient voiced their understanding.
High Dose Vitamin A Pregnancy And Lactation Text: High dose vitamin A therapy is contraindicated during pregnancy and breast feeding.
Tetracycline Counseling: Patient counseled regarding possible photosensitivity and increased risk for sunburn.  Patient instructed to avoid sunlight, if possible.  When exposed to sunlight, patients should wear protective clothing, sunglasses, and sunscreen.  The patient was instructed to call the office immediately if the following severe adverse effects occur:  hearing changes, easy bruising/bleeding, severe headache, or vision changes.  The patient verbalized understanding of the proper use and possible adverse effects of tetracycline.  All of the patient's questions and concerns were addressed. Patient understands to avoid pregnancy while on therapy due to potential birth defects.
Benzoyl Peroxide Pregnancy And Lactation Text: This medication is Pregnancy Category C. It is unknown if benzoyl peroxide is excreted in breast milk.
Spironolactone Counseling: Patient advised regarding risks of diarrhea, abdominal pain, hyperkalemia, birth defects (for female patients), liver toxicity and renal toxicity. The patient may need blood work to monitor liver and kidney function and potassium levels while on therapy. The patient verbalized understanding of the proper use and possible adverse effects of spironolactone.  All of the patient's questions and concerns were addressed.
Dapsone Counseling: I discussed with the patient the risks of dapsone including but not limited to hemolytic anemia, agranulocytosis, rashes, methemoglobinemia, kidney failure, peripheral neuropathy, headaches, GI upset, and liver toxicity.  Patients who start dapsone require monitoring including baseline LFTs and weekly CBCs for the first month, then every month thereafter.  The patient verbalized understanding of the proper use and possible adverse effects of dapsone.  All of the patient's questions and concerns were addressed.
Isotretinoin Pregnancy And Lactation Text: This medication is Pregnancy Category X and is considered extremely dangerous during pregnancy. It is unknown if it is excreted in breast milk.
Topical Sulfur Applications Counseling: Topical Sulfur Counseling: Patient counseled that this medication may cause skin irritation or allergic reactions.  In the event of skin irritation, the patient was advised to reduce the amount of the drug applied or use it less frequently.   The patient verbalized understanding of the proper use and possible adverse effects of topical sulfur application.  All of the patient's questions and concerns were addressed.
Azelaic Acid Pregnancy And Lactation Text: This medication is considered safe during pregnancy and breast feeding.
Topical Clindamycin Counseling: Patient counseled that this medication may cause skin irritation or allergic reactions.  In the event of skin irritation, the patient was advised to reduce the amount of the drug applied or use it less frequently.   The patient verbalized understanding of the proper use and possible adverse effects of clindamycin.  All of the patient's questions and concerns were addressed.
Birth Control Pills Counseling: Birth Control Pill Counseling: I discussed with the patient the potential side effects of OCPs including but not limited to increased risk of stroke, heart attack, thrombophlebitis, deep venous thrombosis, hepatic adenomas, breast changes, GI upset, headaches, and depression.  The patient verbalized understanding of the proper use and possible adverse effects of OCPs. All of the patient's questions and concerns were addressed.
Erythromycin Pregnancy And Lactation Text: This medication is Pregnancy Category B and is considered safe during pregnancy. It is also excreted in breast milk.
Sarecycline Counseling: Patient advised regarding possible photosensitivity and discoloration of the teeth, skin, lips, tongue and gums.  Patient instructed to avoid sunlight, if possible.  When exposed to sunlight, patients should wear protective clothing, sunglasses, and sunscreen.  The patient was instructed to call the office immediately if the following severe adverse effects occur:  hearing changes, easy bruising/bleeding, severe headache, or vision changes.  The patient verbalized understanding of the proper use and possible adverse effects of sarecycline.  All of the patient's questions and concerns were addressed.
Aklief Pregnancy And Lactation Text: It is unknown if this medication is safe to use during pregnancy.  It is unknown if this medication is excreted in breast milk.  Breastfeeding women should use the topical cream on the smallest area of the skin for the shortest time needed while breastfeeding.  Do not apply to nipple and areola.
Bactrim Counseling:  I discussed with the patient the risks of sulfa antibiotics including but not limited to GI upset, allergic reaction, drug rash, diarrhea, dizziness, photosensitivity, and yeast infections.  Rarely, more serious reactions can occur including but not limited to aplastic anemia, agranulocytosis, methemoglobinemia, blood dyscrasias, liver or kidney failure, lung infiltrates or desquamative/blistering drug rashes.
Doxycycline Pregnancy And Lactation Text: This medication is Pregnancy Category D and not consider safe during pregnancy. It is also excreted in breast milk but is considered safe for shorter treatment courses.
Use Enhanced Medication Counseling?: No
all other ROS negative except as per HPI